# Patient Record
Sex: FEMALE | Race: WHITE | NOT HISPANIC OR LATINO | Employment: UNEMPLOYED | URBAN - METROPOLITAN AREA
[De-identification: names, ages, dates, MRNs, and addresses within clinical notes are randomized per-mention and may not be internally consistent; named-entity substitution may affect disease eponyms.]

---

## 2017-06-29 ENCOUNTER — HOSPITAL ENCOUNTER (EMERGENCY)
Facility: HOSPITAL | Age: 52
Discharge: HOME | End: 2017-06-29
Attending: EMERGENCY MEDICINE
Payer: COMMERCIAL

## 2017-06-29 VITALS
HEART RATE: 79 BPM | SYSTOLIC BLOOD PRESSURE: 116 MMHG | TEMPERATURE: 99 F | HEIGHT: 66 IN | BODY MASS INDEX: 26.36 KG/M2 | OXYGEN SATURATION: 96 % | DIASTOLIC BLOOD PRESSURE: 66 MMHG | WEIGHT: 164 LBS | RESPIRATION RATE: 18 BRPM

## 2017-06-29 DIAGNOSIS — T14.90XA TRAUMA: ICD-10-CM

## 2017-06-29 DIAGNOSIS — R51.9 HEADACHE: ICD-10-CM

## 2017-06-29 DIAGNOSIS — G44.319 ACUTE POST-TRAUMATIC HEADACHE, NOT INTRACTABLE: Primary | ICD-10-CM

## 2017-06-29 PROCEDURE — 93005 ELECTROCARDIOGRAM TRACING: CPT

## 2017-06-29 PROCEDURE — 99284 EMERGENCY DEPT VISIT MOD MDM: CPT | Mod: 25

## 2017-06-29 PROCEDURE — 25000003 PHARM REV CODE 250: Performed by: NURSE PRACTITIONER

## 2017-06-29 PROCEDURE — 96372 THER/PROPH/DIAG INJ SC/IM: CPT

## 2017-06-29 PROCEDURE — 63600175 PHARM REV CODE 636 W HCPCS: Performed by: NURSE PRACTITIONER

## 2017-06-29 RX ORDER — PROMETHAZINE HYDROCHLORIDE 25 MG/1
25 TABLET ORAL EVERY 6 HOURS PRN
Qty: 15 TABLET | Refills: 0 | Status: ON HOLD | OUTPATIENT
Start: 2017-06-29 | End: 2020-07-21 | Stop reason: CLARIF

## 2017-06-29 RX ORDER — HYDROCODONE BITARTRATE AND ACETAMINOPHEN 10; 325 MG/1; MG/1
TABLET ORAL
COMMUNITY
End: 2020-07-16

## 2017-06-29 RX ORDER — HYDROMORPHONE HYDROCHLORIDE 2 MG/ML
1 INJECTION, SOLUTION INTRAMUSCULAR; INTRAVENOUS; SUBCUTANEOUS
Status: COMPLETED | OUTPATIENT
Start: 2017-06-29 | End: 2017-06-29

## 2017-06-29 RX ORDER — HYDROCODONE BITARTRATE AND ACETAMINOPHEN 5; 325 MG/1; MG/1
2 TABLET ORAL
Status: COMPLETED | OUTPATIENT
Start: 2017-06-29 | End: 2017-06-29

## 2017-06-29 RX ORDER — ALPRAZOLAM 1 MG/1
1 TABLET ORAL 2 TIMES DAILY
Status: ON HOLD | COMMUNITY
End: 2020-07-21 | Stop reason: CLARIF

## 2017-06-29 RX ORDER — HYDROCODONE BITARTRATE AND ACETAMINOPHEN 7.5; 325 MG/1; MG/1
1 TABLET ORAL EVERY 4 HOURS PRN
Qty: 15 TABLET | Refills: 0 | Status: SHIPPED | OUTPATIENT
Start: 2017-06-29 | End: 2020-07-16

## 2017-06-29 RX ORDER — PROMETHAZINE HYDROCHLORIDE 25 MG/ML
25 INJECTION, SOLUTION INTRAMUSCULAR; INTRAVENOUS
Status: COMPLETED | OUTPATIENT
Start: 2017-06-29 | End: 2017-06-29

## 2017-06-29 RX ADMIN — HYDROMORPHONE HYDROCHLORIDE 1 MG: 2 INJECTION INTRAMUSCULAR; INTRAVENOUS; SUBCUTANEOUS at 06:06

## 2017-06-29 RX ADMIN — PROMETHAZINE HYDROCHLORIDE 25 MG: 25 INJECTION INTRAMUSCULAR; INTRAVENOUS at 06:06

## 2017-06-29 RX ADMIN — HYDROCODONE BITARTRATE AND ACETAMINOPHEN 2 TABLET: 5; 325 TABLET ORAL at 07:06

## 2017-06-29 NOTE — DISCHARGE INSTRUCTIONS
Please take hydrocodone for pain, as needed.    You can take phenergan for nausea, as needed.    Follow up with  for further evaluation and management of your symptoms as soon as possible.    Return to the ER for any new or worsening symptoms or concerns.
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
## 2018-08-03 ENCOUNTER — TELEPHONE (OUTPATIENT)
Dept: PAIN MEDICINE | Facility: CLINIC | Age: 53
End: 2018-08-03

## 2018-08-03 NOTE — TELEPHONE ENCOUNTER
Reminded patient of Pain Management appointment scheduled for Monday at 11.15a with Dr. Holbrook- verbal confirmation received.  Location information also provided.

## 2018-08-06 ENCOUNTER — OFFICE VISIT (OUTPATIENT)
Dept: PAIN MEDICINE | Facility: CLINIC | Age: 53
End: 2018-08-06
Payer: COMMERCIAL

## 2018-08-06 VITALS
HEART RATE: 61 BPM | RESPIRATION RATE: 18 BRPM | BODY MASS INDEX: 27.82 KG/M2 | WEIGHT: 173.13 LBS | DIASTOLIC BLOOD PRESSURE: 66 MMHG | SYSTOLIC BLOOD PRESSURE: 132 MMHG | OXYGEN SATURATION: 97 % | HEIGHT: 66 IN

## 2018-08-06 DIAGNOSIS — G89.4 CHRONIC PAIN DISORDER: Primary | ICD-10-CM

## 2018-08-06 DIAGNOSIS — M79.7 FIBROMYALGIA: ICD-10-CM

## 2018-08-06 PROCEDURE — 99243 OFF/OP CNSLTJ NEW/EST LOW 30: CPT | Mod: S$GLB,,, | Performed by: PAIN MEDICINE

## 2018-08-06 PROCEDURE — 99999 PR PBB SHADOW E&M-EST. PATIENT-LVL III: CPT | Mod: PBBFAC,,, | Performed by: PAIN MEDICINE

## 2018-08-06 NOTE — PROGRESS NOTES
Subjective:     Patient ID: Myra Harrison is a 53 y.o. female    Chief Complaint: Fibromyalgia (patient experiences pain all over due to fibromyalgia- patient states limited mobility- treatment w/ medication,PT ( discontinued due to increased pain levels))      Referred by: Gilles Leyva MD      HPI:    Initial Encounter (8/6/18):  Myra Harrison is a 53 y.o. female who presents today with chronic widespread diagnosed with fibromyalgia. She has been managed with chronic fentanyl and Norco. She also takes Xanax chronically. These are prescribed by her PCP. She was seen by her PCP last month and he refilled her Fentanyl and Norco. For insurance reasons she was unable to fill these prescriptions. She states that she has not taken fentanyl in 3 weeks and has not taken Norco in 1.5 weeks. She denies any overt symptoms of withdrawal but does report increased pain. She states that she hurts all over and that it feels like something is eating her bones. The pain limits her activity. She denies any specific foci of pain. She endorses depressed mood. This pain is described in detail below.    Physical Therapy: Yes    Non-pharmacologic Treatment: Nothing helps         · TENS? No    Pain Medications:         · Currently taking: Nothing    · Has tried in the past:  Opioids, NSAIDs, muscle relaxers, SSRIs/SNRIs, Tylenol    · Has not tried: TCAs, anticonvulsants, topical creams    Blood thinners: None    Interventional Therapies: None    Relevant Surgeries: None    Affecting sleep? Yes    Affecting daily activities? yes    Depressive symptoms? yes          · SI/HI? No    Work status: Unemployed    Pain Scores:    Best:       8/10  Worst:     10/10  Usually:   8/10  Today:    8/10    Review of Systems   Constitutional: Negative for activity change, appetite change, chills, fatigue, fever and unexpected weight change.   HENT: Negative for hearing loss.    Eyes: Negative for visual disturbance.   Respiratory:  Negative for chest tightness and shortness of breath.    Cardiovascular: Negative for chest pain.   Gastrointestinal: Negative for abdominal pain, constipation, diarrhea, nausea and vomiting.   Genitourinary: Negative for difficulty urinating.   Musculoskeletal: Positive for arthralgias, back pain, myalgias, neck pain and neck stiffness. Negative for gait problem.   Skin: Negative for rash.   Neurological: Negative for dizziness, weakness, light-headedness, numbness and headaches.   Psychiatric/Behavioral: Positive for sleep disturbance. Negative for hallucinations and suicidal ideas. The patient is not nervous/anxious.        Past Medical History:   Diagnosis Date    Asthma     Brain mass 2007    Depression     Fibromyalgia     Seizures 2007    fell and hit head at grocery          Past Surgical History:   Procedure Laterality Date    CARPAL TUNNEL RELEASE      right     SECTION      x 3    cysto/ureteroscopy stent placement      HYSTERECTOMY      TONSILLECTOMY         Social History     Social History    Marital status:      Spouse name: N/A    Number of children: N/A    Years of education: N/A     Occupational History    Not on file.     Social History Main Topics    Smoking status: Never Smoker    Smokeless tobacco: Never Used    Alcohol use Yes      Comment: occasionally    Drug use: No    Sexual activity: Yes     Other Topics Concern    Not on file     Social History Narrative    No narrative on file       Review of patient's allergies indicates:   Allergen Reactions    Penicillins Itching       Current Outpatient Prescriptions on File Prior to Visit   Medication Sig Dispense Refill    alprazolam (XANAX) 1 MG tablet Take 1 mg by mouth 2 (two) times daily.      citalopram (CELEXA) 10 MG tablet Take 10 mg by mouth once daily.  1    ondansetron (ZOFRAN-ODT) 4 MG TbDL Take 1 tablet (4 mg total) by mouth every 8 (eight) hours as needed. 10 tablet 0    phentermine 37.5 MG  "capsule Take 37.5 mg by mouth 2 (two) times daily.      promethazine (PHENERGAN) 12.5 MG Tab Take 1 tablet (12.5 mg total) by mouth 4 (four) times daily. 20 tablet 0    promethazine (PHENERGAN) 25 MG tablet Take 1 tablet (25 mg total) by mouth every 6 (six) hours as needed for Nausea. May cause drowsiness 15 tablet 0    ranitidine (ZANTAC) 75 MG tablet Take 75 mg by mouth 2 (two) times daily.      UNABLE TO FIND Apply 1 patch topically Every 3 (three) days. medication name:       fentaNYL (DURAGESIC) 25 mcg/hr   0    hydrocodone-acetaminophen 10-325mg (NORCO)  mg Tab Take by mouth.      hydrocodone-acetaminophen 7.5-325mg (NORCO) 7.5-325 mg per tablet Take 1 tablet by mouth every 4 (four) hours as needed for Pain. May cause drowsiness 15 tablet 0    oxybutynin (DITROPAN-XL) 10 MG 24 hr tablet Take 1 tablet (10 mg total) by mouth once daily. 30 tablet 1    oxycodone (ROXICODONE) 15 MG Tab Take 1 tablet (15 mg total) by mouth every 4 (four) hours as needed. 20 tablet 0    oxycodone-acetaminophen (PERCOCET) 5-325 mg per tablet Take 1 tablet by mouth every 4 (four) hours as needed for Pain. 20 tablet 0     No current facility-administered medications on file prior to visit.        Objective:      /66   Pulse 61   Resp 18   Ht 5' 6" (1.676 m)   Wt 78.5 kg (173 lb 1.6 oz)   SpO2 97%   BMI 27.94 kg/m²     Exam:  GEN:  Well developed, well nourished.  No acute distress.   HEENT:  No trauma.  Mucous membranes moist.  Nares patent bilaterally.  PSYCH: Normal affect. Thought content appropriate.  CHEST:  Breathing symmetric.  No audible wheezing.  ABD: Soft, non-distended.  SKIN:  Warm, pink, dry.  No rash on exposed areas.    EXT:  No cyanosis, clubbing, or edema.  No color change or changes in nail or hair growth.  NEURO/MUSCULOSKELETAL:  Fully alert, oriented, and appropriate. Speech normal jon. No cranial nerve deficits.   Gait: Normal.  No focal motor deficits.       Imaging:  No pertinent " imaging available for review at this time.     Assessment:       Encounter Diagnoses   Name Primary?    Chronic pain disorder Yes    Fibromyalgia          Plan:       Myra was seen today for fibromyalgia.    Diagnoses and all orders for this visit:    Chronic pain disorder    Fibromyalgia        Myra Harrison is a 53 y.o. female with chronic widespread pain. Has been diagnosed with fibromyalgia. Previously managed with chronic opioid medications. Has essentially been weaned recently.     1. We had a very long discussion about the proper way to treat pain from fibromyalgia. I stressed the importance of ensuring that depression/anxiety is weill controlled. I also stressed the importance of daily low-impact, low-intensity, aerobic activity for 60 continuous minutes.   2. We discuss that opioid medications are not indicated for the treatment of chronic fibromyalgia pain. We discussed that potential adverse effects of long term opioid use. I recommended that she decline any further opioid medications for the treatment of her fibromyalgia.  3. I recommend that she be weaned from Xanax as well. This is known to disrupt physiologic sleep cycles and lead to worsened pain and mood.  4. RTC PRN.

## 2018-08-06 NOTE — LETTER
August 6, 2018      Gilles Leyva MD  [address] [facility] [address]  [place] [address]           [facility]  [address] [facility]  [place] [address]  Phone: [phone]  Fax: [phone]          Patient: Myra Harrison   MR Number: [id number]   YOB: 1965   Date of Visit: 8/6/2018       Dear Dr. Gilles Leyva:    Thank you for referring Myra Harrison to me for evaluation. Attached you will find relevant portions of my assessment and plan of care.    If you have questions, please do not hesitate to call me. I look forward to following Myra Harrison along with you.    Sincerely,    Mario Holbrook Jr., MD    Enclosure  CC:  No Recipients    If you would like to receive this communication electronically, please contact [email or web address] or [phone] to request more information on [employer] Link access.    For providers and/or their staff who would like to refer a patient to [facility], please contact us through our one-stop-shop provider referral line, [facility], at [phone].    If you feel you have received this communication in error or would no longer like to receive these types of communications, please e-mail [email or web address]

## 2020-07-16 ENCOUNTER — HOSPITAL ENCOUNTER (EMERGENCY)
Facility: HOSPITAL | Age: 55
Discharge: HOME | End: 2020-07-16
Attending: EMERGENCY MEDICINE
Payer: COMMERCIAL

## 2020-07-16 VITALS
DIASTOLIC BLOOD PRESSURE: 62 MMHG | RESPIRATION RATE: 20 BRPM | TEMPERATURE: 99 F | BODY MASS INDEX: 24.11 KG/M2 | OXYGEN SATURATION: 93 % | HEART RATE: 83 BPM | WEIGHT: 150 LBS | SYSTOLIC BLOOD PRESSURE: 130 MMHG | HEIGHT: 66 IN

## 2020-07-16 DIAGNOSIS — R05.9 COUGH: ICD-10-CM

## 2020-07-16 DIAGNOSIS — R50.9 FEVER: ICD-10-CM

## 2020-07-16 DIAGNOSIS — J06.9 VIRAL URI WITH COUGH: Primary | ICD-10-CM

## 2020-07-16 LAB
ALBUMIN SERPL BCP-MCNC: 3.6 G/DL (ref 3.5–5.2)
ALP SERPL-CCNC: 77 U/L (ref 55–135)
ALT SERPL W/O P-5'-P-CCNC: 36 U/L (ref 10–44)
ANION GAP SERPL CALC-SCNC: 12 MMOL/L (ref 8–16)
AST SERPL-CCNC: 33 U/L (ref 10–40)
BACTERIA #/AREA URNS HPF: NORMAL /HPF
BASOPHILS # BLD AUTO: 0.03 K/UL (ref 0–0.2)
BASOPHILS NFR BLD: 0.3 % (ref 0–1.9)
BILIRUB SERPL-MCNC: 0.4 MG/DL (ref 0.1–1)
BILIRUB UR QL STRIP: NEGATIVE
BUN SERPL-MCNC: 10 MG/DL (ref 6–20)
CALCIUM SERPL-MCNC: 9.7 MG/DL (ref 8.7–10.5)
CHLORIDE SERPL-SCNC: 105 MMOL/L (ref 95–110)
CK SERPL-CCNC: 38 U/L (ref 20–180)
CLARITY UR: CLEAR
CO2 SERPL-SCNC: 23 MMOL/L (ref 23–29)
COLOR UR: ABNORMAL
CREAT SERPL-MCNC: 0.9 MG/DL (ref 0.5–1.4)
CRP SERPL-MCNC: 88.3 MG/L (ref 0–8.2)
DIFFERENTIAL METHOD: ABNORMAL
EOSINOPHIL # BLD AUTO: 0.1 K/UL (ref 0–0.5)
EOSINOPHIL NFR BLD: 0.7 % (ref 0–8)
ERYTHROCYTE [DISTWIDTH] IN BLOOD BY AUTOMATED COUNT: 12.3 % (ref 11.5–14.5)
EST. GFR  (AFRICAN AMERICAN): >60 ML/MIN/1.73 M^2
EST. GFR  (NON AFRICAN AMERICAN): >60 ML/MIN/1.73 M^2
FERRITIN SERPL-MCNC: 594 NG/ML (ref 20–300)
GLUCOSE SERPL-MCNC: 98 MG/DL (ref 70–110)
GLUCOSE UR QL STRIP: NEGATIVE
HCT VFR BLD AUTO: 41.9 % (ref 37–48.5)
HGB BLD-MCNC: 14.1 G/DL (ref 12–16)
HGB UR QL STRIP: ABNORMAL
IMM GRANULOCYTES # BLD AUTO: 0.03 K/UL (ref 0–0.04)
IMM GRANULOCYTES NFR BLD AUTO: 0.3 % (ref 0–0.5)
KETONES UR QL STRIP: NEGATIVE
LACTATE SERPL-SCNC: 1.2 MMOL/L (ref 0.5–2.2)
LDH SERPL L TO P-CCNC: 181 U/L (ref 110–260)
LEUKOCYTE ESTERASE UR QL STRIP: ABNORMAL
LYMPHOCYTES # BLD AUTO: 1.5 K/UL (ref 1–4.8)
LYMPHOCYTES NFR BLD: 16.4 % (ref 18–48)
MCH RBC QN AUTO: 30.1 PG (ref 27–31)
MCHC RBC AUTO-ENTMCNC: 33.7 G/DL (ref 32–36)
MCV RBC AUTO: 89 FL (ref 82–98)
MICROSCOPIC COMMENT: NORMAL
MONOCYTES # BLD AUTO: 0.4 K/UL (ref 0.3–1)
MONOCYTES NFR BLD: 4.2 % (ref 4–15)
NEUTROPHILS # BLD AUTO: 7.2 K/UL (ref 1.8–7.7)
NEUTROPHILS NFR BLD: 78.1 % (ref 38–73)
NITRITE UR QL STRIP: NEGATIVE
NRBC BLD-RTO: 0 /100 WBC
PH UR STRIP: 6 [PH] (ref 5–8)
PLATELET # BLD AUTO: 299 K/UL (ref 150–350)
PMV BLD AUTO: 10.3 FL (ref 9.2–12.9)
POTASSIUM SERPL-SCNC: 3.7 MMOL/L (ref 3.5–5.1)
PROCALCITONIN SERPL IA-MCNC: 0.13 NG/ML
PROT SERPL-MCNC: 7.7 G/DL (ref 6–8.4)
PROT UR QL STRIP: NEGATIVE
RBC # BLD AUTO: 4.69 M/UL (ref 4–5.4)
RBC #/AREA URNS HPF: 2 /HPF (ref 0–4)
SARS-COV-2 RDRP RESP QL NAA+PROBE: NEGATIVE
SODIUM SERPL-SCNC: 140 MMOL/L (ref 136–145)
SP GR UR STRIP: 1 (ref 1–1.03)
SQUAMOUS #/AREA URNS HPF: 2 /HPF
TROPONIN I SERPL DL<=0.01 NG/ML-MCNC: <0.006 NG/ML (ref 0–0.03)
URN SPEC COLLECT METH UR: ABNORMAL
UROBILINOGEN UR STRIP-ACNC: NEGATIVE EU/DL
WBC # BLD AUTO: 9.23 K/UL (ref 3.9–12.7)
WBC #/AREA URNS HPF: 4 /HPF (ref 0–5)

## 2020-07-16 PROCEDURE — 96375 TX/PRO/DX INJ NEW DRUG ADDON: CPT

## 2020-07-16 PROCEDURE — 87040 BLOOD CULTURE FOR BACTERIA: CPT

## 2020-07-16 PROCEDURE — 96361 HYDRATE IV INFUSION ADD-ON: CPT

## 2020-07-16 PROCEDURE — 25000003 PHARM REV CODE 250: Performed by: PHYSICIAN ASSISTANT

## 2020-07-16 PROCEDURE — 85025 COMPLETE CBC W/AUTO DIFF WBC: CPT

## 2020-07-16 PROCEDURE — 96374 THER/PROPH/DIAG INJ IV PUSH: CPT

## 2020-07-16 PROCEDURE — 84145 PROCALCITONIN (PCT): CPT

## 2020-07-16 PROCEDURE — 80053 COMPREHEN METABOLIC PANEL: CPT

## 2020-07-16 PROCEDURE — 87186 SC STD MICRODIL/AGAR DIL: CPT

## 2020-07-16 PROCEDURE — 82550 ASSAY OF CK (CPK): CPT

## 2020-07-16 PROCEDURE — C9113 INJ PANTOPRAZOLE SODIUM, VIA: HCPCS | Performed by: PHYSICIAN ASSISTANT

## 2020-07-16 PROCEDURE — 99285 EMERGENCY DEPT VISIT HI MDM: CPT | Mod: 25

## 2020-07-16 PROCEDURE — 86140 C-REACTIVE PROTEIN: CPT

## 2020-07-16 PROCEDURE — 93010 ELECTROCARDIOGRAM REPORT: CPT | Mod: ,,, | Performed by: INTERNAL MEDICINE

## 2020-07-16 PROCEDURE — 63600175 PHARM REV CODE 636 W HCPCS: Performed by: PHYSICIAN ASSISTANT

## 2020-07-16 PROCEDURE — U0002 COVID-19 LAB TEST NON-CDC: HCPCS

## 2020-07-16 PROCEDURE — 82728 ASSAY OF FERRITIN: CPT

## 2020-07-16 PROCEDURE — 83615 LACTATE (LD) (LDH) ENZYME: CPT

## 2020-07-16 PROCEDURE — 93005 ELECTROCARDIOGRAM TRACING: CPT

## 2020-07-16 PROCEDURE — 83605 ASSAY OF LACTIC ACID: CPT

## 2020-07-16 PROCEDURE — 93010 EKG 12-LEAD: ICD-10-PCS | Mod: ,,, | Performed by: INTERNAL MEDICINE

## 2020-07-16 PROCEDURE — 87077 CULTURE AEROBIC IDENTIFY: CPT

## 2020-07-16 PROCEDURE — 84484 ASSAY OF TROPONIN QUANT: CPT

## 2020-07-16 PROCEDURE — 81000 URINALYSIS NONAUTO W/SCOPE: CPT

## 2020-07-16 RX ORDER — PANTOPRAZOLE SODIUM 40 MG/10ML
40 INJECTION, POWDER, LYOPHILIZED, FOR SOLUTION INTRAVENOUS
Status: COMPLETED | OUTPATIENT
Start: 2020-07-16 | End: 2020-07-16

## 2020-07-16 RX ORDER — ACETAMINOPHEN 500 MG
1000 TABLET ORAL
Status: COMPLETED | OUTPATIENT
Start: 2020-07-16 | End: 2020-07-16

## 2020-07-16 RX ORDER — ONDANSETRON 2 MG/ML
8 INJECTION INTRAMUSCULAR; INTRAVENOUS
Status: COMPLETED | OUTPATIENT
Start: 2020-07-16 | End: 2020-07-16

## 2020-07-16 RX ORDER — ALBUTEROL SULFATE 90 UG/1
2 AEROSOL, METERED RESPIRATORY (INHALATION) EVERY 6 HOURS PRN
Status: DISCONTINUED | OUTPATIENT
Start: 2020-07-16 | End: 2020-07-16

## 2020-07-16 RX ORDER — ALBUTEROL SULFATE 90 UG/1
1-2 AEROSOL, METERED RESPIRATORY (INHALATION) EVERY 4 HOURS PRN
Qty: 8 G | Refills: 0 | Status: SHIPPED | OUTPATIENT
Start: 2020-07-16 | End: 2021-07-16

## 2020-07-16 RX ORDER — PANTOPRAZOLE SODIUM 20 MG/1
20 TABLET, DELAYED RELEASE ORAL DAILY
Qty: 30 TABLET | Refills: 0 | Status: ON HOLD | OUTPATIENT
Start: 2020-07-16 | End: 2020-07-21 | Stop reason: SDUPTHER

## 2020-07-16 RX ORDER — GUAIFENESIN/DEXTROMETHORPHAN 100-10MG/5
10 SYRUP ORAL ONCE
Status: COMPLETED | OUTPATIENT
Start: 2020-07-16 | End: 2020-07-16

## 2020-07-16 RX ORDER — ONDANSETRON 4 MG/1
4 TABLET, ORALLY DISINTEGRATING ORAL EVERY 6 HOURS PRN
Qty: 20 TABLET | Refills: 0 | Status: ON HOLD | OUTPATIENT
Start: 2020-07-16 | End: 2020-07-21 | Stop reason: SDUPTHER

## 2020-07-16 RX ORDER — PROMETHAZINE HYDROCHLORIDE AND DEXTROMETHORPHAN HYDROBROMIDE 6.25; 15 MG/5ML; MG/5ML
5 SYRUP ORAL NIGHTLY PRN
Qty: 120 ML | Refills: 0 | Status: ON HOLD | OUTPATIENT
Start: 2020-07-16 | End: 2020-07-21 | Stop reason: CLARIF

## 2020-07-16 RX ADMIN — ONDANSETRON HYDROCHLORIDE 8 MG: 2 SOLUTION INTRAMUSCULAR; INTRAVENOUS at 06:07

## 2020-07-16 RX ADMIN — SODIUM CHLORIDE 2040 ML: 0.9 INJECTION, SOLUTION INTRAVENOUS at 06:07

## 2020-07-16 RX ADMIN — PANTOPRAZOLE SODIUM 40 MG: 40 INJECTION, POWDER, FOR SOLUTION INTRAVENOUS at 06:07

## 2020-07-16 RX ADMIN — GUAIFENESIN AND DEXTROMETHORPHAN 10 ML: 100; 10 SYRUP ORAL at 07:07

## 2020-07-16 RX ADMIN — ACETAMINOPHEN 1000 MG: 500 TABLET ORAL at 06:07

## 2020-07-16 NOTE — PROVIDER PROGRESS NOTES - EMERGENCY DEPT.
Emergency Department TeleTRIAGE Encounter Note      CHIEF COMPLAINT    Chief Complaint   Patient presents with    COVID-19 Concerns     Pt c/o cough, off and on fever, NV, back pain, chest pain (worsens when taking a deep breath). Spoke with primary care and advised to seek ED for a chest x-ray       VITAL SIGNS   Initial Vitals [07/16/20 1742]   BP Pulse Resp Temp SpO2   127/75 110 20 (!) 102.1 °F (38.9 °C) 97 %      MAP       --            ALLERGIES    Review of patient's allergies indicates:   Allergen Reactions    Penicillins Itching       PROVIDER TRIAGE NOTE  Patient is a 55-year-old female presenting to the emergency department for evaluation of fever and cough.  Patient states her symptoms have been persistent for the past few days.  She also reports nausea and vomiting.  She tried taking Tylenol 30 min prior to arrival as well as Phenergan.  She states that neither of them have helped.  PCP recommended she come to the ED for chest x-ray.  She does report some mild shortness of breath.      ORDERS  Labs Reviewed - No data to display    ED Orders (720h ago, onward)    Start Ordered     Status Ordering Provider    07/16/20 1746 07/16/20 1746  X-Ray Chest AP Portable  1 time imaging      Ordered JERI HARRISON            Virtual Visit Note: The provider triage portion of this emergency department evaluation and documentation was performed via [employer], a HIPAA-compliant telemedicine application, in concert with a tele-presenter in the room. A face to face patient evaluation with one of my colleagues will occur once the patient is placed in an emergency department room.      DISCLAIMER: This note was prepared with [employer] voice recognition transcription software. Garbled syntax, mangled pronouns, and other bizarre constructions may be attributed to that software system.

## 2020-07-16 NOTE — ED PROVIDER NOTES
Encounter Date: 2020       History     Chief Complaint   Patient presents with    COVID-19 Concerns     Pt c/o cough, off and on fever, NV, back pain, chest pain (worsens when taking a deep breath). 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  She states yesterday she began with shortness of breath, nausea vomiting, posttussive emesis, chills, myalgias.  Today she began with fever and midsternal chest pain.  Chest pain is constant, nonexertional.  No associated lightheadedness, dizziness.  No radiation of chest pain to her back, down her arms, to her neck.  No cardiac history.  She does feel generally weak and tired over the past 2 days.  She also admits to GERD-like burning epigastric pain x2 days.  No urinary complaints.  No flank pain.  She denies sick contacts or recent hospitalization.  No neck pain or stiffness.  No IV drug use.  Anorexia x2 days.  No unilateral leg swelling or calf pain.        Review of patient's allergies indicates:   Allergen Reactions    Penicillins Itching     Past Medical History:   Diagnosis Date    Asthma     Brain mass     Depression     Fibromyalgia     Hypertension     Seizures     fell and hit head at grocery        Past Surgical History:   Procedure Laterality Date    CARPAL TUNNEL RELEASE      right     SECTION      x 3    cysto/ureteroscopy stent placement      HYSTERECTOMY      TONSILLECTOMY       Family History   Problem Relation Age of Onset    Cancer Mother         Breast cancer    Heart disease Mother     Diabetes Father     Cancer Maternal Grandmother         Lung Cancer    Cancer Maternal Grandfather         Lung Cancer    Cancer Paternal Grandmother     Cancer Paternal Grandfather       Social History     Tobacco Use    Smoking status: Never Smoker    Smokeless tobacco: Never Used   Substance Use Topics    Alcohol use: Yes     Comment: occasionally    Drug use: No     Review of Systems   Constitutional: Positive for appetite change, chills, fatigue and fever.   Respiratory: Positive for cough and shortness of breath. Negative for chest tightness.    Cardiovascular: Positive for chest pain. Negative for palpitations and leg swelling.   Gastrointestinal: Positive for abdominal pain, nausea and vomiting. Negative for constipation and diarrhea.   Genitourinary: Negative for dysuria and flank pain.   Musculoskeletal: Positive for myalgias. Negative for neck pain and neck stiffness.   Skin: Negative for rash.   Neurological: Positive for weakness. Negative for dizziness, syncope and light-headedness.       Physical Exam     Initial Vitals [07/16/20 1742]   BP Pulse Resp Temp SpO2   127/75 110 20 (!) 102.1 °F (38.9 °C) 97 %      MAP       --         Physical Exam    Nursing note and vitals reviewed.  Constitutional: She appears well-developed and well-nourished. She is not diaphoretic. No distress.   Ill appearing, nontoxic.  Infrequent nonproductive cough during exam.  Posttussive emesis during exam.   HENT:   Head: Normocephalic and atraumatic.   Tacky mucous membranes   Eyes: EOM are normal.   Neck: Normal range of motion. Neck supple.   Cardiovascular: Intact distal pulses.   Sinus tachycardia.  No pretibial edema.  No unilateral leg swelling.   Pulmonary/Chest: Breath sounds normal. No respiratory distress. She has no wheezes. She has no rhonchi. She exhibits no tenderness.   Abdominal: She exhibits no distension. There is no rebound and no guarding.   Mild epigastric tenderness.   Musculoskeletal: Normal range of motion.   Neurological: She is alert and oriented to person, place, and time. GCS score is 15. GCS eye subscore is 4. GCS verbal subscore is 5. GCS motor subscore is 6.   Skin: Skin  is warm. Capillary refill takes less than 2 seconds.   Psychiatric: She has a normal mood and affect. Thought content normal.         ED Course   Procedures  Labs Reviewed   CBC W/ AUTO DIFFERENTIAL - Abnormal; Notable for the following components:       Result Value    Gran% 78.1 (*)     Lymph% 16.4 (*)     All other components within normal limits   URINALYSIS, REFLEX TO URINE CULTURE - Abnormal; Notable for the following components:    Occult Blood UA 1+ (*)     Leukocytes, UA 2+ (*)     All other components within normal limits    Narrative:     Specimen Source->Urine   C-REACTIVE PROTEIN - Abnormal; Notable for the following components:    CRP 88.3 (*)     All other components within normal limits   FERRITIN - Abnormal; Notable for the following components:    Ferritin 594 (*)     All other components within normal limits   CULTURE, BLOOD   CULTURE, BLOOD   COMPREHENSIVE METABOLIC PANEL   LACTIC ACID, PLASMA   LACTATE DEHYDROGENASE   CK   SARS-COV-2 RNA AMPLIFICATION, QUAL   TROPONIN I   PROCALCITONIN   URINALYSIS MICROSCOPIC    Narrative:     Specimen Source->Urine     EKG Readings: (Independently Interpreted)   Sinus tachycardia, ventricular rate 112 beats per minute.  Normal MS.  Slightly prolonged QT, similar to previous.  Appearance of aVR and aVL likely related to limb lead reversal.  No arrhythmia, no ST elevation.    Repeat EKG with improved appearance of the aVR and aVL abnormalities on previous EKG.       Imaging Results          X-Ray Chest AP Portable (Final result)  Result time 07/16/20 18:47:58    Final result by Israel Jackson MD (07/16/20 18:47:58)                 Impression:      No acute cardiopulmonary process identified.      Electronically signed by: Israel Jackson MD  Date:    07/16/2020  Time:    18:47             Narrative:    EXAMINATION:  XR CHEST AP PORTABLE    CLINICAL HISTORY:  Cough    TECHNIQUE:  Single frontal view of the chest was performed.    COMPARISON:  June  2017.    FINDINGS:  Cardiac silhouette is normal in size.  Lungs are symmetrically expanded.  No evidence of focal consolidative process, pneumothorax, or significant pleural effusion.  No acute osseous abnormality identified.                              X-Rays:   Independently Interpreted Readings:   Chest X-Ray: No consolidation, effusion, or pneumothorax.  No acute bony abnormality.     Medical Decision Making:   Differential Diagnosis:   Viral illness, pneumonia, bronchitis, pharyngitis, myocardial ischemia  Clinical Tests:   Lab Tests: Ordered and Reviewed  Radiological Study: Ordered and Reviewed  Medical Tests: Ordered and Reviewed  ED Management:  55-year-old female with 3 week history of cough, now with chills myalgias nausea vomiting posttussive emesis shortness of breath yesterday.  Began with chest pain and fever today.  She is ill-appearing.  She is febrile.  She is normotensive.  Lungs are clear.  Normal oxygenation.  I will treat her symptoms, give her some fluids, check labs, re-evaluate.    No history of thrombophilia.  No recent surgery.  No major trauma. No recent immobility.  No active cancer.  No exogenous estrogen.  Patient is not pregnant.  This is not following the immediate postpartum interval if patient has been pregnant. No history of percutaneous indwelling catheter. No previous DVT/PE.  I will treat her fever, re-evaluate vitals.  I think unlikely PE at this time.    Overall, suspect viral illness discomfort patient's complaints and presentation.  I will discharge with supportive medications, have her contact her primary for follow-up.  She does understand and agree this plan.                   ED Course as of Jul 16 2151   Thu Jul 16, 2020   1832 Ambulatory pulse oximetry 98%.    [SM]   1939 On re-evaluation, she is feeling better.  No longer coughing nearly as much.  Shortness of breath nearly resolved.  Continue to monitor, remainder of labs pending.  So far, workup grossly  unremarkable without evidence of pneumonia, troponin within normal limits, no leukocytosis, lactic within normal limits.  Rapid COVID negative.    [SM]      ED Course User Index  [SM] Navdeep Peterson PA-C                Clinical Impression:       ICD-10-CM ICD-9-CM   1. Viral URI with cough  J06.9 465.9    B97.89    2. Cough  R05 786.2   3. Fever  R50.9 780.60         Disposition:   Disposition: Discharged  Condition: Stable     ED Disposition Condition    Discharge Stable        ED Prescriptions     Medication Sig Dispense Start Date End Date Auth. Provider    ondansetron (ZOFRAN-ODT) 4 MG TbDL Take 1 tablet (4 mg total) by mouth every 6 (six) hours as needed (Nausea). 20 tablet 7/16/2020  Navdeep Peterson PA-C    albuterol (PROVENTIL/VENTOLIN HFA) 90 mcg/actuation inhaler Inhale 1-2 puffs into the lungs every 4 (four) hours as needed for Shortness of Breath. Rescue 8 g 7/16/2020 7/16/2021 Navdeep Peterson PA-C    pantoprazole (PROTONIX) 20 MG tablet Take 1 tablet (20 mg total) by mouth once daily. 30 tablet 7/16/2020 7/16/2021 Navdeep Peterson PA-C    promethazine-dextromethorphan (PROMETHAZINE-DM) 6.25-15 mg/5 mL Syrp Take 5 mLs by mouth nightly as needed (cough). 120 mL 7/16/2020 7/26/2020 Navdeep Peterson PA-C        Follow-up Information     Follow up With Specialties Details Why Contact Info    Gilles Leyva MD Internal Medicine Schedule an appointment as soon as possible for a visit  For reevaluation [address] [facility]  [facility] [address]  [phone]      [facility] Emergency Medicine  As needed, If symptoms worsen [address] [place] [address] [phone]                                     Navdeep Peterson PA-C  07/16/20 [id number]

## 2020-07-16 NOTE — ED TRIAGE NOTES
Pt reports to ED c/o SOB, chest tightness, stabbing pain in lower back, vomiting x3 episodes today. Pt c/o of cough for 2 weeks and was instructed by MD to come into ER for hx of PNA. Pt is satting at 98%. Pt reports chills and reports to ED with fever. Pt reports body aches, HA, and dizziness.    99.1

## 2020-07-17 NOTE — DISCHARGE INSTRUCTIONS
Drink lots of fluids, stay well hydrated. Tylenol/Ibuprofen as needed for discomfort; go back and forth between these two medications every 4 hrs as needed for temp greater than or equal to 100.4F, as needed for congestion/headache/body aches. Robitussin for cough.  Promethazine DM for cough in the evenings. Be aware, this medication is sedating.  Do not mix with alcohol or any other sedating medications.  Do not drive or operate machinery when taking this medication.  Albuterol inhaler as needed for any shortness of breath or coughing bouts.  Zofran as needed for nausea.     Follow-up with primary care provider for reevaluation, further recommendations. Return to this ED if unable to treat fever, if symptoms persist or worsen despite treatment, if you begin with worsening shortness of breath or difficulty breathing, if you begin with severe chest pain, if no longer eating or drinking, if any other problems occur.

## 2020-07-19 ENCOUNTER — HOSPITAL ENCOUNTER (INPATIENT)
Facility: HOSPITAL | Age: 55
LOS: 2 days | Discharge: HOME | DRG: 660 | End: 2020-07-21
Attending: EMERGENCY MEDICINE | Admitting: EMERGENCY MEDICINE
Payer: COMMERCIAL

## 2020-07-19 DIAGNOSIS — R31.9 URINARY TRACT INFECTION WITH HEMATURIA, SITE UNSPECIFIED: ICD-10-CM

## 2020-07-19 DIAGNOSIS — R05.9 COUGH: ICD-10-CM

## 2020-07-19 DIAGNOSIS — R78.81 E COLI BACTEREMIA: Primary | ICD-10-CM

## 2020-07-19 DIAGNOSIS — R78.81 BACTEREMIA: ICD-10-CM

## 2020-07-19 DIAGNOSIS — B96.20 E COLI BACTEREMIA: Primary | ICD-10-CM

## 2020-07-19 DIAGNOSIS — N39.0 URINARY TRACT INFECTION WITH HEMATURIA, SITE UNSPECIFIED: ICD-10-CM

## 2020-07-19 PROBLEM — G40.909 SEIZURE DISORDER: Status: ACTIVE | Noted: 2020-07-19

## 2020-07-19 PROBLEM — F32.9 MDD (MAJOR DEPRESSIVE DISORDER): Status: ACTIVE | Noted: 2020-07-19

## 2020-07-19 PROBLEM — I10 ESSENTIAL HYPERTENSION: Status: ACTIVE | Noted: 2020-07-19

## 2020-07-19 PROBLEM — J45.909 ASTHMA: Status: ACTIVE | Noted: 2020-07-19

## 2020-07-19 PROBLEM — Z87.442 HISTORY OF NEPHROLITHIASIS: Status: ACTIVE | Noted: 2020-07-19

## 2020-07-19 PROBLEM — U07.1 COVID-19: Status: ACTIVE | Noted: 2020-07-19

## 2020-07-19 LAB
ALBUMIN SERPL BCP-MCNC: 3.2 G/DL (ref 3.5–5.2)
ALP SERPL-CCNC: 69 U/L (ref 55–135)
ALT SERPL W/O P-5'-P-CCNC: 35 U/L (ref 10–44)
ANION GAP SERPL CALC-SCNC: 9 MMOL/L (ref 8–16)
AST SERPL-CCNC: 53 U/L (ref 10–40)
BACTERIA #/AREA URNS HPF: ABNORMAL /HPF
BACTERIA BLD CULT: ABNORMAL
BASOPHILS # BLD AUTO: 0.03 K/UL (ref 0–0.2)
BASOPHILS NFR BLD: 0.4 % (ref 0–1.9)
BILIRUB SERPL-MCNC: 0.3 MG/DL (ref 0.1–1)
BILIRUB UR QL STRIP: NEGATIVE
BUN SERPL-MCNC: 11 MG/DL (ref 6–20)
CALCIUM SERPL-MCNC: 9.8 MG/DL (ref 8.7–10.5)
CHLORIDE SERPL-SCNC: 107 MMOL/L (ref 95–110)
CK SERPL-CCNC: 42 U/L (ref 20–180)
CLARITY UR: ABNORMAL
CO2 SERPL-SCNC: 25 MMOL/L (ref 23–29)
COLOR UR: YELLOW
CREAT SERPL-MCNC: 0.9 MG/DL (ref 0.5–1.4)
CRP SERPL-MCNC: 57.7 MG/L (ref 0–8.2)
D DIMER PPP IA.FEU-MCNC: 1.87 MG/L FEU
DIFFERENTIAL METHOD: NORMAL
EOSINOPHIL # BLD AUTO: 0.2 K/UL (ref 0–0.5)
EOSINOPHIL NFR BLD: 3 % (ref 0–8)
ERYTHROCYTE [DISTWIDTH] IN BLOOD BY AUTOMATED COUNT: 12 % (ref 11.5–14.5)
ERYTHROCYTE [SEDIMENTATION RATE] IN BLOOD BY WESTERGREN METHOD: 80 MM/HR (ref 0–20)
EST. GFR  (AFRICAN AMERICAN): >60 ML/MIN/1.73 M^2
EST. GFR  (NON AFRICAN AMERICAN): >60 ML/MIN/1.73 M^2
FERRITIN SERPL-MCNC: 798 NG/ML (ref 20–300)
GLUCOSE SERPL-MCNC: 92 MG/DL (ref 70–110)
GLUCOSE UR QL STRIP: NEGATIVE
HCT VFR BLD AUTO: 42.4 % (ref 37–48.5)
HGB BLD-MCNC: 13.8 G/DL (ref 12–16)
HGB UR QL STRIP: ABNORMAL
HIV1+2 IGG SERPL QL IA.RAPID: NORMAL
HYALINE CASTS #/AREA URNS LPF: 0 /LPF
IMM GRANULOCYTES # BLD AUTO: 0.03 K/UL (ref 0–0.04)
IMM GRANULOCYTES NFR BLD AUTO: 0.4 % (ref 0–0.5)
KETONES UR QL STRIP: NEGATIVE
LACTATE SERPL-SCNC: 0.8 MMOL/L (ref 0.5–2.2)
LACTATE SERPL-SCNC: 0.8 MMOL/L (ref 0.5–2.2)
LDH SERPL L TO P-CCNC: 186 U/L (ref 110–260)
LDH SERPL L TO P-CCNC: 208 U/L (ref 110–260)
LEUKOCYTE ESTERASE UR QL STRIP: ABNORMAL
LYMPHOCYTES # BLD AUTO: 2 K/UL (ref 1–4.8)
LYMPHOCYTES NFR BLD: 29.5 % (ref 18–48)
MCH RBC QN AUTO: 29.2 PG (ref 27–31)
MCHC RBC AUTO-ENTMCNC: 32.5 G/DL (ref 32–36)
MCV RBC AUTO: 90 FL (ref 82–98)
MICROSCOPIC COMMENT: ABNORMAL
MONOCYTES # BLD AUTO: 0.3 K/UL (ref 0.3–1)
MONOCYTES NFR BLD: 4.9 % (ref 4–15)
NEUTROPHILS # BLD AUTO: 4.2 K/UL (ref 1.8–7.7)
NEUTROPHILS NFR BLD: 61.8 % (ref 38–73)
NITRITE UR QL STRIP: NEGATIVE
NRBC BLD-RTO: 0 /100 WBC
PH UR STRIP: 5 [PH] (ref 5–8)
PLATELET # BLD AUTO: 304 K/UL (ref 150–350)
PMV BLD AUTO: 10.6 FL (ref 9.2–12.9)
POTASSIUM SERPL-SCNC: 4.1 MMOL/L (ref 3.5–5.1)
PROCALCITONIN SERPL IA-MCNC: 0.83 NG/ML
PROT SERPL-MCNC: 7.2 G/DL (ref 6–8.4)
PROT UR QL STRIP: ABNORMAL
RBC # BLD AUTO: 4.73 M/UL (ref 4–5.4)
RBC #/AREA URNS HPF: 10 /HPF (ref 0–4)
SARS-COV-2 RDRP RESP QL NAA+PROBE: NEGATIVE
SODIUM SERPL-SCNC: 141 MMOL/L (ref 136–145)
SP GR UR STRIP: 1.01 (ref 1–1.03)
URN SPEC COLLECT METH UR: ABNORMAL
UROBILINOGEN UR STRIP-ACNC: NEGATIVE EU/DL
WBC # BLD AUTO: 6.77 K/UL (ref 3.9–12.7)
WBC #/AREA URNS HPF: >100 /HPF (ref 0–5)

## 2020-07-19 PROCEDURE — 83615 LACTATE (LD) (LDH) ENZYME: CPT

## 2020-07-19 PROCEDURE — 87186 SC STD MICRODIL/AGAR DIL: CPT

## 2020-07-19 PROCEDURE — 87088 URINE BACTERIA CULTURE: CPT

## 2020-07-19 PROCEDURE — 83615 LACTATE (LD) (LDH) ENZYME: CPT | Mod: 91

## 2020-07-19 PROCEDURE — 87086 URINE CULTURE/COLONY COUNT: CPT

## 2020-07-19 PROCEDURE — 86703 HIV-1/HIV-2 1 RESULT ANTBDY: CPT

## 2020-07-19 PROCEDURE — 25000003 PHARM REV CODE 250: Performed by: NURSE PRACTITIONER

## 2020-07-19 PROCEDURE — 85379 FIBRIN DEGRADATION QUANT: CPT

## 2020-07-19 PROCEDURE — 80307 DRUG TEST PRSMV CHEM ANLYZR: CPT

## 2020-07-19 PROCEDURE — 11000001 HC ACUTE MED/SURG PRIVATE ROOM

## 2020-07-19 PROCEDURE — 84145 PROCALCITONIN (PCT): CPT

## 2020-07-19 PROCEDURE — 63600175 PHARM REV CODE 636 W HCPCS: Performed by: HOSPITALIST

## 2020-07-19 PROCEDURE — 80053 COMPREHEN METABOLIC PANEL: CPT

## 2020-07-19 PROCEDURE — 83605 ASSAY OF LACTIC ACID: CPT

## 2020-07-19 PROCEDURE — U0002 COVID-19 LAB TEST NON-CDC: HCPCS

## 2020-07-19 PROCEDURE — 81000 URINALYSIS NONAUTO W/SCOPE: CPT | Mod: 59

## 2020-07-19 PROCEDURE — 86140 C-REACTIVE PROTEIN: CPT

## 2020-07-19 PROCEDURE — 82550 ASSAY OF CK (CPK): CPT

## 2020-07-19 PROCEDURE — 99285 EMERGENCY DEPT VISIT HI MDM: CPT | Mod: 25

## 2020-07-19 PROCEDURE — 87077 CULTURE AEROBIC IDENTIFY: CPT

## 2020-07-19 PROCEDURE — 85025 COMPLETE CBC W/AUTO DIFF WBC: CPT

## 2020-07-19 PROCEDURE — 51798 US URINE CAPACITY MEASURE: CPT

## 2020-07-19 PROCEDURE — U0003 INFECTIOUS AGENT DETECTION BY NUCLEIC ACID (DNA OR RNA); SEVERE ACUTE RESPIRATORY SYNDROME CORONAVIRUS 2 (SARS-COV-2) (CORONAVIRUS DISEASE [COVID-19]), AMPLIFIED PROBE TECHNIQUE, MAKING USE OF HIGH THROUGHPUT TECHNOLOGIES AS DESCRIBED BY CMS-2020-01-R: HCPCS

## 2020-07-19 PROCEDURE — 87040 BLOOD CULTURE FOR BACTERIA: CPT | Mod: 59

## 2020-07-19 PROCEDURE — 82728 ASSAY OF FERRITIN: CPT

## 2020-07-19 PROCEDURE — 25000003 PHARM REV CODE 250: Performed by: HOSPITALIST

## 2020-07-19 PROCEDURE — 85652 RBC SED RATE AUTOMATED: CPT

## 2020-07-19 PROCEDURE — 25000003 PHARM REV CODE 250: Performed by: EMERGENCY MEDICINE

## 2020-07-19 RX ORDER — ALBUTEROL SULFATE 90 UG/1
2 AEROSOL, METERED RESPIRATORY (INHALATION) EVERY 6 HOURS PRN
Status: DISCONTINUED | OUTPATIENT
Start: 2020-07-19 | End: 2020-07-21 | Stop reason: HOSPADM

## 2020-07-19 RX ORDER — CIPROFLOXACIN 2 MG/ML
400 INJECTION, SOLUTION INTRAVENOUS
Status: DISCONTINUED | OUTPATIENT
Start: 2020-07-19 | End: 2020-07-21 | Stop reason: HOSPADM

## 2020-07-19 RX ORDER — ACETAMINOPHEN 325 MG/1
650 TABLET ORAL EVERY 8 HOURS PRN
Status: DISCONTINUED | OUTPATIENT
Start: 2020-07-19 | End: 2020-07-21 | Stop reason: HOSPADM

## 2020-07-19 RX ORDER — PANTOPRAZOLE SODIUM 40 MG/1
40 TABLET, DELAYED RELEASE ORAL DAILY
Status: DISCONTINUED | OUTPATIENT
Start: 2020-07-20 | End: 2020-07-21 | Stop reason: HOSPADM

## 2020-07-19 RX ORDER — OXYBUTYNIN CHLORIDE 5 MG/1
10 TABLET, EXTENDED RELEASE ORAL DAILY
Status: DISCONTINUED | OUTPATIENT
Start: 2020-07-20 | End: 2020-07-21 | Stop reason: HOSPADM

## 2020-07-19 RX ORDER — IBUPROFEN 200 MG
24 TABLET ORAL
Status: DISCONTINUED | OUTPATIENT
Start: 2020-07-19 | End: 2020-07-21 | Stop reason: HOSPADM

## 2020-07-19 RX ORDER — TAMSULOSIN HYDROCHLORIDE 0.4 MG/1
0.4 CAPSULE ORAL DAILY
Status: DISCONTINUED | OUTPATIENT
Start: 2020-07-19 | End: 2020-07-21 | Stop reason: HOSPADM

## 2020-07-19 RX ORDER — IBUPROFEN 200 MG
16 TABLET ORAL
Status: DISCONTINUED | OUTPATIENT
Start: 2020-07-19 | End: 2020-07-21 | Stop reason: HOSPADM

## 2020-07-19 RX ORDER — ACETAMINOPHEN 325 MG/1
650 TABLET ORAL EVERY 4 HOURS PRN
Status: DISCONTINUED | OUTPATIENT
Start: 2020-07-19 | End: 2020-07-21 | Stop reason: HOSPADM

## 2020-07-19 RX ORDER — ENOXAPARIN SODIUM 100 MG/ML
40 INJECTION SUBCUTANEOUS EVERY 24 HOURS
Status: DISCONTINUED | OUTPATIENT
Start: 2020-07-19 | End: 2020-07-19

## 2020-07-19 RX ORDER — ONDANSETRON 8 MG/1
8 TABLET, ORALLY DISINTEGRATING ORAL EVERY 8 HOURS PRN
Status: DISCONTINUED | OUTPATIENT
Start: 2020-07-19 | End: 2020-07-21 | Stop reason: HOSPADM

## 2020-07-19 RX ORDER — SODIUM CHLORIDE 0.9 % (FLUSH) 0.9 %
10 SYRINGE (ML) INJECTION
Status: DISCONTINUED | OUTPATIENT
Start: 2020-07-19 | End: 2020-07-21 | Stop reason: HOSPADM

## 2020-07-19 RX ORDER — SODIUM CHLORIDE 9 MG/ML
INJECTION, SOLUTION INTRAVENOUS CONTINUOUS
Status: DISCONTINUED | OUTPATIENT
Start: 2020-07-19 | End: 2020-07-21

## 2020-07-19 RX ORDER — HYDRALAZINE HYDROCHLORIDE 20 MG/ML
10 INJECTION INTRAMUSCULAR; INTRAVENOUS EVERY 8 HOURS PRN
Status: DISCONTINUED | OUTPATIENT
Start: 2020-07-19 | End: 2020-07-21 | Stop reason: HOSPADM

## 2020-07-19 RX ORDER — ALPRAZOLAM 0.5 MG/1
1 TABLET ORAL DAILY PRN
Status: DISCONTINUED | OUTPATIENT
Start: 2020-07-19 | End: 2020-07-19

## 2020-07-19 RX ORDER — ENOXAPARIN SODIUM 100 MG/ML
40 INJECTION SUBCUTANEOUS EVERY 24 HOURS
Status: DISCONTINUED | OUTPATIENT
Start: 2020-07-20 | End: 2020-07-21 | Stop reason: HOSPADM

## 2020-07-19 RX ORDER — GLUCAGON 1 MG
1 KIT INJECTION
Status: DISCONTINUED | OUTPATIENT
Start: 2020-07-19 | End: 2020-07-21 | Stop reason: HOSPADM

## 2020-07-19 RX ORDER — KETOROLAC TROMETHAMINE 30 MG/ML
15 INJECTION, SOLUTION INTRAMUSCULAR; INTRAVENOUS EVERY 6 HOURS PRN
Status: DISCONTINUED | OUTPATIENT
Start: 2020-07-19 | End: 2020-07-21 | Stop reason: HOSPADM

## 2020-07-19 RX ORDER — ALBUTEROL SULFATE 90 UG/1
2 AEROSOL, METERED RESPIRATORY (INHALATION) EVERY 8 HOURS
Status: DISCONTINUED | OUTPATIENT
Start: 2020-07-20 | End: 2020-07-19

## 2020-07-19 RX ORDER — ALPRAZOLAM 0.5 MG/1
1 TABLET ORAL 2 TIMES DAILY
Status: DISCONTINUED | OUTPATIENT
Start: 2020-07-19 | End: 2020-07-19

## 2020-07-19 RX ORDER — BUTALBITAL, ACETAMINOPHEN AND CAFFEINE 50; 325; 40 MG/1; MG/1; MG/1
1 TABLET ORAL
Status: COMPLETED | OUTPATIENT
Start: 2020-07-19 | End: 2020-07-19

## 2020-07-19 RX ADMIN — BUTALBITAL, ACETAMINOPHEN, AND CAFFEINE 1 TABLET: 50; 325; 40 TABLET ORAL at 04:07

## 2020-07-19 RX ADMIN — TAMSULOSIN HYDROCHLORIDE 0.4 MG: 0.4 CAPSULE ORAL at 09:07

## 2020-07-19 RX ADMIN — ONDANSETRON 8 MG: 8 TABLET, ORALLY DISINTEGRATING ORAL at 10:07

## 2020-07-19 RX ADMIN — CIPROFLOXACIN 400 MG: 2 INJECTION, SOLUTION INTRAVENOUS at 09:07

## 2020-07-19 RX ADMIN — SODIUM CHLORIDE: 0.9 INJECTION, SOLUTION INTRAVENOUS at 09:07

## 2020-07-19 NOTE — ASSESSMENT & PLAN NOTE
-History noted  -xanax not on  and per Pain Management Dr. Holbrook-wean off. Will not start for now.   -Hold citalopram for now given treatment with cipro and interaction

## 2020-07-19 NOTE — ASSESSMENT & PLAN NOTE
"-History noted. Stated see [facility] Neurologist but I do not see in care everywhere. Reports "was passing out" and last episode 1 year ago.    -Not on anti-epileptic medications at home.   -Follow up with [facility] Neurologist outpatient    "

## 2020-07-19 NOTE — ASSESSMENT & PLAN NOTE
-Mrs. Harrison is admitted to inpatient status  -Noted 2/4 bottles from blood cultures two days ago (7/16/20) in ER positive for E.coli. Urine culture not collected at that time.   -While not present two days ago and no symptoms of UTI presently, UA now suggestive of UTI.  Noted history of nephrolithiasis and with left flank pain. Non toxic appearing at this time.   -Noted lack of leukocytosis and not septic at this time.    -CT Abd/pelvis renal stone showed mod hydronephrosis on the left secondary to 7/8 mm stone in the proximal left ureter and mild perinephric stranding on the left  -Check echo, rapid hiv, procalcitonin  -Blood cultures and urine cultures obtained in ER  -Will treat with gentle IV fluids and cipro  -Start flomax ; not sure if 7x 8 mm will pass though  -Keep NPO p MN until seen by Urology.   -Consult ID and Urology in AM for recommendations.

## 2020-07-19 NOTE — ASSESSMENT & PLAN NOTE
-History noted. Reports on fentanyl patch but I do not see on . Will not start at this time. Toradol PRN

## 2020-07-19 NOTE — ASSESSMENT & PLAN NOTE
-History noted. Very controlled. Use approximately once every 3 months  -Provide albuterol MDI as needed

## 2020-07-19 NOTE — ED PROVIDER NOTES
"Encounter Date: 2020    SCRIBE #1 NOTE: I, Elsy Roman, am scribing for, and in the presence of,  Kamilla Rivera MD. I have scribed the following portions of the note - Other sections scribed: HPI, ROS, PE.       History     Chief Complaint   Patient presents with    Abnormal Lab     Positive blood culture - E.Coli in 1 of 2 bottles     CC: Abnormal lab    HPI: This is a 55 y.o. female with a PMHx of hypertension and asthma who presents to the Emergency Department with a cc of an abnormal lab. Patient states she was evaluated in the ED four days ago for a cough and she received a call that she had a positive blood culture - e.coli in 1 of 2 bottles. Her current symptoms include fever, nausea, sore throat, a nonproductive cough, shortness of breath, and chest pain. She denieas dysuria, diarrhea, vomiting, rhinorrhea, or abdominal pain. Shortness of breath and chest pain are worsened with coughing. No alleviating factors noted. Patient reports no piror history of similar symptoms. She is not a smoker and is allergic to Penicillin. She reports fever yesterday to 100.8, took acetaminophen this morning. + fatigue, +anorexia. + nausea and "gagging" no vomiting.     The history is provided by the patient. No  was used.     Review of patient's allergies indicates:   Allergen Reactions    Penicillins Itching     Past Medical History:   Diagnosis Date    Asthma     Brain mass     Depression     Fibromyalgia     Hypertension     Seizures     fell and hit head at grocery        Past Surgical History:   Procedure Laterality Date    CARPAL TUNNEL RELEASE      right     SECTION      x 3    cysto/ureteroscopy stent placement      HYSTERECTOMY      TONSILLECTOMY       Family History   Problem Relation Age of Onset    Cancer Mother         Breast cancer    Heart disease Mother     Diabetes Father     Cancer Maternal Grandmother         Lung Cancer    Cancer Maternal " Grandfather         Lung Cancer    Cancer Paternal Grandmother     Cancer Paternal Grandfather      Social History     Tobacco Use    Smoking status: Never Smoker    Smokeless tobacco: Never Used   Substance Use Topics    Alcohol use: Yes     Comment: occasionally    Drug use: No     Review of Systems   Constitutional: Positive for fever. Negative for chills and diaphoresis.   HENT: Positive for sore throat. Negative for congestion and rhinorrhea.    Eyes: Negative for photophobia and visual disturbance.   Respiratory: Positive for cough and shortness of breath.    Cardiovascular: Positive for chest pain. Negative for leg swelling.   Gastrointestinal: Positive for nausea. Negative for abdominal pain, blood in stool, constipation, diarrhea and vomiting.   Genitourinary: Negative for dysuria, flank pain, frequency, hematuria and urgency.   Musculoskeletal: Negative for back pain, neck pain and neck stiffness.   Skin: Negative for rash and wound.   Neurological: Negative for dizziness, weakness, light-headedness, numbness and headaches.   Psychiatric/Behavioral: Negative for confusion and suicidal ideas.   All other systems reviewed and are negative.      Physical Exam     Initial Vitals [07/19/20 1411]   BP Pulse Resp Temp SpO2   (!) 126/94 90 18 98.7 °F (37.1 °C) 97 %      MAP       --         Physical Exam    Nursing note and vitals reviewed.  Constitutional: She appears well-developed and well-nourished. She is not diaphoretic. No distress.   HENT:   Head: Normocephalic and atraumatic.   Right Ear: External ear normal.   Left Ear: External ear normal.   Mouth/Throat: Oropharynx is clear and moist. No oropharyngeal exudate.   Eyes: Conjunctivae and EOM are normal. Pupils are equal, round, and reactive to light. Right eye exhibits no discharge. Left eye exhibits no discharge.   Neck: Normal range of motion. Neck supple. No JVD present.   Cardiovascular: Normal rate, regular rhythm, normal heart sounds and  intact distal pulses. Exam reveals no gallop and no friction rub.    No murmur heard.  Pulmonary/Chest: Breath sounds normal. No respiratory distress. She has no wheezes. She has no rhonchi. She has no rales.   Pt is coughing on exam   Abdominal: Soft. Bowel sounds are normal. She exhibits no distension. There is no abdominal tenderness. There is no rebound and no guarding.   Negative Roberts's sign, no CVA tenderness   Musculoskeletal: Normal range of motion. No tenderness or edema.   Lymphadenopathy:     She has no cervical adenopathy.   Neurological: She is alert and oriented to person, place, and time. No cranial nerve deficit. GCS score is 15. GCS eye subscore is 4. GCS verbal subscore is 5. GCS motor subscore is 6.   Skin: Skin is warm and dry. Capillary refill takes less than 2 seconds.   Psychiatric: She has a normal mood and affect. Her behavior is normal. Thought content normal.         ED Course   Procedures  Labs Reviewed   COMPREHENSIVE METABOLIC PANEL - Abnormal; Notable for the following components:       Result Value    Albumin 3.2 (*)     AST 53 (*)     All other components within normal limits    Narrative:     Recoll. [id number] by VALERI at 07/19/2020 15:16, reason:   HEMOLYZED/DISCARDED/VERN   URINALYSIS, REFLEX TO URINE CULTURE - Abnormal; Notable for the following components:    Appearance, UA Cloudy (*)     Protein, UA 2+ (*)     Occult Blood UA 2+ (*)     Leukocytes, UA 3+ (*)     All other components within normal limits    Narrative:     Specimen Source->Urine   URINALYSIS MICROSCOPIC - Abnormal; Notable for the following components:    RBC, UA 10 (*)     WBC, UA >100 (*)     Bacteria Moderate (*)     All other components within normal limits    Narrative:     Specimen Source->Urine   C-REACTIVE PROTEIN - Abnormal; Notable for the following components:    CRP 57.7 (*)     All other components within normal limits   D DIMER, QUANTITATIVE - Abnormal; Notable for the following components:     D-Dimer 1.87 (*)     All other components within normal limits   CULTURE, BLOOD   CULTURE, BLOOD   CULTURE, URINE   CULTURE, RESPIRATORY   LACTIC ACID, PLASMA   CBC W/ AUTO DIFFERENTIAL   LACTIC ACID, PLASMA    Narrative:     Recoll. [id number] by LM1 at 07/19/2020 15:16, reason:   HEMOLYZED/DISCARDED/VERN   SARS-COV-2 RNA AMPLIFICATION, QUAL   LACTATE DEHYDROGENASE   CK   LACTATE DEHYDROGENASE   LACTATE DEHYDROGENASE   CK    Narrative:     Recoll. [id number] by LM1 at 07/19/2020 15:16, reason:   HEMOLYZED/DISCARDED/VERN   DRUG SCREEN PANEL, URINE EMERGENCY   SEDIMENTATION RATE   SARS-COV-2 (COVID-19) QUALITATIVE PCR   DRUG SCREEN PANEL, URINE EMERGENCY          Imaging Results           CT Renal Stone Study Abd Pelvis WO (Final result)  Result time 07/19/20 18:55:24    Final result by Azam Alexis MD (07/19/20 18:55:24)                 Impression:      1. Moderate hydronephrosis on the left secondary to a 7 x 8 mm stone in the proximal left ureter.  Recommend follow-up.  2. Mild diverticulosis of the colon with no evidence of acute diverticulitis.  3. Trace pericardial effusion.  4. Small fat containing umbilical hernia.  5.  This report was flagged in Epic as abnormal.      Electronically signed by: Azam Alexis  Date:    07/19/2020  Time:    18:55             Narrative:    EXAMINATION:  CT RENAL STONE STUDY ABD PELVIS WO    CLINICAL HISTORY:  Flank pain, kidney stone suspected;    TECHNIQUE:  Low dose axial images, sagittal and coronal reformations were obtained from the lung bases to the pubic symphysis.  Contrast was not administered.    COMPARISON:  04/23/2015    FINDINGS:  Heart: The heart is normal size.  Trace pericardial effusion.    Lung Bases: Well aerated, without consolidation or pleural fluid.    Liver: Normal in size and attenuation, with no focal hepatic lesions.    Gallbladder: No calcified gallstones.    Bile Ducts: No evidence of dilated ducts.    Pancreas: No mass or peripancreatic  fat stranding.    Spleen: Unremarkable.    Adrenals: Unremarkable.    Kidneys/ Ureters: Moderate hydronephrosis on the left secondary to a 7 x 8 mm stone in the proximal left ureter.  Mild perinephric stranding on the left.  No other stones are present in the left kidney.    The right kidney is within normal limits.    Bladder: No evidence of wall thickening.    Reproductive organs: The uterus is present.  No adnexal mass.    GI Tract/Mesentery: No evidence of bowel obstruction or inflammation.  Mild diverticulosis of the colon.  No evidence of acute diverticulitis.  Mild retained feces throughout the colon.    The appendix is within normal limits.    Peritoneal Space: No ascites. No free air.    Retroperitoneum: No significant adenopathy.    Abdominal wall: Small fat containing umbilical hernia.    Vasculature: No significant atherosclerosis or aneurysm.    Bones: No acute fracture.  There is facet arthropathy at L4-5 and L5-S1 bilaterally.                               X-Ray Chest AP Portable (Final result)  Result time 07/19/20 14:55:15    Final result by Isidro Vincent MD (07/19/20 14:55:15)                 Impression:      No acute findings.      Electronically signed by: Isidro Vincent MD  Date:    07/19/2020  Time:    14:55             Narrative:    EXAMINATION:  XR CHEST AP PORTABLE    CLINICAL HISTORY:  Cough    TECHNIQUE:  Single frontal view of the chest was performed.    COMPARISON:  07/16/2020    FINDINGS:  The cardiomediastinal contour is within normal limits.  Lungs are clear.  No pneumothorax or pleural effusions.                            MDM  55-year-old female presents after she was called back for positive E coli blood culture she has been febrile at home was recently yesterday.  She denies any abdominal pain, urinary symptoms.  She does report some nausea and gagging but her biggest complaint is cough and viral URI like symptoms.  Her COVID test was negative x2.  On exam patient abdomen soft  nontender no CVA tenderness.  Negative Roberts sign.  She has a dry cough on exam.  Lung sounds are clear.  She is not tachycardic she is not febrile.  Differential diagnosis includes was not limited to bacteremia, urinary tract infection, contaminant, intra-abdominal infection.  Obtained repeat blood cultures.  Lactate within normal limits.  Urinalysis is now grossly infected with greater than 100 white blood cells.  Patient continues to deny symptoms.  I will admit the patient with Dr. Woody for IV antibiotics and repeat blood cultures.  He will place further admission orders and admit the patient for further workup and treatment.  Patient was in agreement plan and all questions answered.                Scribe Attestation:   Scribe #1: I performed the above scribed service and the documentation accurately describes the services I performed. I attest to the accuracy of the note.                          Clinical Impression:     1. E coli bacteremia    2. Cough    3. Bacteremia    4. Urinary tract infection with hematuria, site unspecified                ED Disposition Condition    Admit            I, Kamilla Rivera, personally performed the services described in this documentation. All medical record entries made by the scribe were at my direction and in my presence. I have reviewed the chart and agree that the record reflects my personal performance and is accurate and complete.                 Kamilla Rivera MD  07/19/20 2058

## 2020-07-19 NOTE — ASSESSMENT & PLAN NOTE
-2015 left ureter stone. Per Urology , status post Uri's, attempted stone extraction and ureter stent placement however postop CT showed residual stone in kidney and non obstructive at that time.  -CT renal stone AP shows moderate hydronephrosis on the left secondary to a 7 x 8 mm stone in the proximal left ureter and mild perinephric stranding on the left.  -start flomax  -Keep NPO  -Discussed with Urology above. If develops fever or worsen in symptoms please call Dr. Brice back tonight-Discussed with our Nocturnist.

## 2020-07-19 NOTE — ED TRIAGE NOTES
Seen x 4 days ago.  Called today told to come in for repeat bloodwork.  Dry cough x 3 weeks.  + nausea.  Denies vomiting, diarrhea or fever.  Sob w/ chest pains.  Sob all the time worse with cough.  Chest pains w/ coughing. Denies radiation.  Tylenol for fever this am.

## 2020-07-19 NOTE — ASSESSMENT & PLAN NOTE
-Has significant cough and apparent significant upper airway viral infection  -Rapid covid is negative and no leukocytosis, hypoxia or infiltration on CXR.  -Check routine screening covid, d-dimer, crp, ldh and ferritin  -Tessalon PRN cough  -Maintain covid isolation for now.

## 2020-07-20 ENCOUNTER — TELEPHONE (OUTPATIENT)
Dept: UROLOGY | Facility: CLINIC | Age: 55
End: 2020-07-20

## 2020-07-20 ENCOUNTER — ANESTHESIA EVENT (OUTPATIENT)
Dept: SURGERY | Facility: HOSPITAL | Age: 55
DRG: 660 | End: 2020-07-20
Payer: COMMERCIAL

## 2020-07-20 ENCOUNTER — ANESTHESIA (OUTPATIENT)
Dept: SURGERY | Facility: HOSPITAL | Age: 55
DRG: 660 | End: 2020-07-20
Payer: COMMERCIAL

## 2020-07-20 PROBLEM — B96.20 E COLI BACTEREMIA: Status: RESOLVED | Noted: 2020-07-20 | Resolved: 2020-07-20

## 2020-07-20 PROBLEM — R78.81 E COLI BACTEREMIA: Status: RESOLVED | Noted: 2020-07-20 | Resolved: 2020-07-20

## 2020-07-20 PROBLEM — B96.20 E. COLI UTI: Status: ACTIVE | Noted: 2020-07-19

## 2020-07-20 PROBLEM — B96.20 E COLI BACTEREMIA: Status: ACTIVE | Noted: 2020-07-20

## 2020-07-20 PROBLEM — R78.81 E COLI BACTEREMIA: Status: ACTIVE | Noted: 2020-07-20

## 2020-07-20 LAB
ALBUMIN SERPL BCP-MCNC: 3 G/DL (ref 3.5–5.2)
ALP SERPL-CCNC: 61 U/L (ref 55–135)
ALT SERPL W/O P-5'-P-CCNC: 33 U/L (ref 10–44)
AMPHET+METHAMPHET UR QL: NEGATIVE
ANION GAP SERPL CALC-SCNC: 7 MMOL/L (ref 8–16)
AORTIC ROOT ANNULUS: 3.05 CM
AORTIC VALVE CUSP SEPERATION: 1.95 CM
ASCENDING AORTA: 2.65 CM
AST SERPL-CCNC: 41 U/L (ref 10–40)
AV INDEX (PROSTH): 0.65
AV MEAN GRADIENT: 3 MMHG
AV PEAK GRADIENT: 8 MMHG
AV VALVE AREA: 2.86 CM2
AV VELOCITY RATIO: 0.61
BARBITURATES UR QL SCN>200 NG/ML: NORMAL
BASOPHILS # BLD AUTO: 0.03 K/UL (ref 0–0.2)
BASOPHILS NFR BLD: 0.5 % (ref 0–1.9)
BENZODIAZ UR QL SCN>200 NG/ML: NEGATIVE
BILIRUB SERPL-MCNC: 0.3 MG/DL (ref 0.1–1)
BSA FOR ECHO PROCEDURE: 1.78 M2
BUN SERPL-MCNC: 11 MG/DL (ref 6–20)
BZE UR QL SCN: NEGATIVE
CALCIUM SERPL-MCNC: 8.9 MG/DL (ref 8.7–10.5)
CANNABINOIDS UR QL SCN: NEGATIVE
CHLORIDE SERPL-SCNC: 108 MMOL/L (ref 95–110)
CO2 SERPL-SCNC: 27 MMOL/L (ref 23–29)
CREAT SERPL-MCNC: 0.9 MG/DL (ref 0.5–1.4)
CREAT UR-MCNC: 78.3 MG/DL (ref 15–325)
CV ECHO LV RWT: 0.42 CM
DIFFERENTIAL METHOD: ABNORMAL
DOP CALC AO PEAK VEL: 1.4 M/S
DOP CALC AO VTI: 25.49 CM
DOP CALC LVOT AREA: 4.4 CM2
DOP CALC LVOT DIAMETER: 2.37 CM
DOP CALC LVOT PEAK VEL: 0.86 M/S
DOP CALC LVOT STROKE VOLUME: 72.97 CM3
DOP CALCLVOT PEAK VEL VTI: 16.55 CM
E WAVE DECELERATION TIME: 264.03 MSEC
E/A RATIO: 1.31
ECHO LV POSTERIOR WALL: 0.96 CM (ref 0.6–1.1)
EOSINOPHIL # BLD AUTO: 0.2 K/UL (ref 0–0.5)
EOSINOPHIL NFR BLD: 2.6 % (ref 0–8)
ERYTHROCYTE [DISTWIDTH] IN BLOOD BY AUTOMATED COUNT: 12.1 % (ref 11.5–14.5)
EST. GFR  (AFRICAN AMERICAN): >60 ML/MIN/1.73 M^2
EST. GFR  (NON AFRICAN AMERICAN): >60 ML/MIN/1.73 M^2
FRACTIONAL SHORTENING: 29 % (ref 28–44)
GLUCOSE SERPL-MCNC: 87 MG/DL (ref 70–110)
HCT VFR BLD AUTO: 36.7 % (ref 37–48.5)
HGB BLD-MCNC: 12 G/DL (ref 12–16)
IMM GRANULOCYTES # BLD AUTO: 0.03 K/UL (ref 0–0.04)
IMM GRANULOCYTES NFR BLD AUTO: 0.5 % (ref 0–0.5)
INTERVENTRICULAR SEPTUM: 0.84 CM (ref 0.6–1.1)
IVRT: 108.42 MSEC
LA MAJOR: 5.26 CM
LA MINOR: 5.24 CM
LA WIDTH: 3.76 CM
LEFT ATRIUM SIZE: 3.55 CM
LEFT ATRIUM VOLUME INDEX: 33.6 ML/M2
LEFT ATRIUM VOLUME: 59.57 CM3
LEFT INTERNAL DIMENSION IN SYSTOLE: 3.24 CM (ref 2.1–4)
LEFT VENTRICLE DIASTOLIC VOLUME INDEX: 54.4 ML/M2
LEFT VENTRICLE DIASTOLIC VOLUME: 96.45 ML
LEFT VENTRICLE MASS INDEX: 77 G/M2
LEFT VENTRICLE SYSTOLIC VOLUME INDEX: 23.8 ML/M2
LEFT VENTRICLE SYSTOLIC VOLUME: 42.15 ML
LEFT VENTRICULAR INTERNAL DIMENSION IN DIASTOLE: 4.58 CM (ref 3.5–6)
LEFT VENTRICULAR MASS: 136.73 G
LYMPHOCYTES # BLD AUTO: 2 K/UL (ref 1–4.8)
LYMPHOCYTES NFR BLD: 29.5 % (ref 18–48)
MAGNESIUM SERPL-MCNC: 2 MG/DL (ref 1.6–2.6)
MCH RBC QN AUTO: 29.3 PG (ref 27–31)
MCHC RBC AUTO-ENTMCNC: 32.7 G/DL (ref 32–36)
MCV RBC AUTO: 90 FL (ref 82–98)
METHADONE UR QL SCN>300 NG/ML: NEGATIVE
MONOCYTES # BLD AUTO: 0.4 K/UL (ref 0.3–1)
MONOCYTES NFR BLD: 5.6 % (ref 4–15)
MV PEAK A VEL: 0.62 M/S
MV PEAK E VEL: 0.81 M/S
MV STENOSIS PRESSURE HALF TIME: 78.7 MS
MV VALVE AREA P 1/2 METHOD: 2.8 CM2
NEUTROPHILS # BLD AUTO: 4.1 K/UL (ref 1.8–7.7)
NEUTROPHILS NFR BLD: 61.3 % (ref 38–73)
NRBC BLD-RTO: 0 /100 WBC
OPIATES UR QL SCN: NEGATIVE
PCP UR QL SCN>25 NG/ML: NEGATIVE
PISA TR MAX VEL: 2.14 M/S
PLATELET # BLD AUTO: 261 K/UL (ref 150–350)
PMV BLD AUTO: 10.1 FL (ref 9.2–12.9)
POTASSIUM SERPL-SCNC: 3.5 MMOL/L (ref 3.5–5.1)
PROT SERPL-MCNC: 6.5 G/DL (ref 6–8.4)
PULM VEIN S/D RATIO: 1.62
PV PEAK D VEL: 0.26 M/S
PV PEAK S VEL: 0.42 M/S
PV PEAK VELOCITY: 0.88 CM/S
RA MAJOR: 4.58 CM
RA WIDTH: 3.6 CM
RBC # BLD AUTO: 4.09 M/UL (ref 4–5.4)
RIGHT VENTRICULAR END-DIASTOLIC DIMENSION: 3.23 CM
SARS-COV-2 RNA RESP QL NAA+PROBE: NOT DETECTED
SINUS: 3.32 CM
SODIUM SERPL-SCNC: 142 MMOL/L (ref 136–145)
STJ: 2.66 CM
TOXICOLOGY INFORMATION: NORMAL
TR MAX PG: 18 MMHG
TRICUSPID ANNULAR PLANE SYSTOLIC EXCURSION: 1.73 CM
WBC # BLD AUTO: 6.62 K/UL (ref 3.9–12.7)

## 2020-07-20 PROCEDURE — 25000003 PHARM REV CODE 250: Performed by: UROLOGY

## 2020-07-20 PROCEDURE — 25500020 PHARM REV CODE 255: Performed by: UROLOGY

## 2020-07-20 PROCEDURE — C1758 CATHETER, URETERAL: HCPCS | Performed by: UROLOGY

## 2020-07-20 PROCEDURE — 37000008 HC ANESTHESIA 1ST 15 MINUTES: Performed by: UROLOGY

## 2020-07-20 PROCEDURE — 63600175 PHARM REV CODE 636 W HCPCS: Performed by: NURSE ANESTHETIST, CERTIFIED REGISTERED

## 2020-07-20 PROCEDURE — 37000009 HC ANESTHESIA EA ADD 15 MINS: Performed by: UROLOGY

## 2020-07-20 PROCEDURE — C1769 GUIDE WIRE: HCPCS | Performed by: UROLOGY

## 2020-07-20 PROCEDURE — 80053 COMPREHEN METABOLIC PANEL: CPT

## 2020-07-20 PROCEDURE — 63600175 PHARM REV CODE 636 W HCPCS: Performed by: UROLOGY

## 2020-07-20 PROCEDURE — C2617 STENT, NON-COR, TEM W/O DEL: HCPCS | Performed by: UROLOGY

## 2020-07-20 PROCEDURE — D9220A PRA ANESTHESIA: ICD-10-PCS | Mod: CRNA,,, | Performed by: NURSE ANESTHETIST, CERTIFIED REGISTERED

## 2020-07-20 PROCEDURE — 25000003 PHARM REV CODE 250: Performed by: NURSE PRACTITIONER

## 2020-07-20 PROCEDURE — 36000706: Performed by: UROLOGY

## 2020-07-20 PROCEDURE — D9220A PRA ANESTHESIA: Mod: CRNA,,, | Performed by: NURSE ANESTHETIST, CERTIFIED REGISTERED

## 2020-07-20 PROCEDURE — 25000003 PHARM REV CODE 250: Performed by: HOSPITALIST

## 2020-07-20 PROCEDURE — 99253 IP/OBS CNSLTJ NEW/EST LOW 45: CPT | Mod: ,,, | Performed by: INTERNAL MEDICINE

## 2020-07-20 PROCEDURE — D9220A PRA ANESTHESIA: Mod: ANES,,, | Performed by: ANESTHESIOLOGY

## 2020-07-20 PROCEDURE — 36000707: Performed by: UROLOGY

## 2020-07-20 PROCEDURE — D9220A PRA ANESTHESIA: ICD-10-PCS | Mod: ANES,,, | Performed by: ANESTHESIOLOGY

## 2020-07-20 PROCEDURE — 63600175 PHARM REV CODE 636 W HCPCS: Performed by: ANESTHESIOLOGY

## 2020-07-20 PROCEDURE — 11000001 HC ACUTE MED/SURG PRIVATE ROOM

## 2020-07-20 PROCEDURE — 71000033 HC RECOVERY, INTIAL HOUR: Performed by: UROLOGY

## 2020-07-20 PROCEDURE — 52332 CYSTOSCOPY AND TREATMENT: CPT | Mod: LT,,, | Performed by: UROLOGY

## 2020-07-20 PROCEDURE — 52332 PR CYSTOSCOPY,INSERT URETERAL STENT: ICD-10-PCS | Mod: LT,,, | Performed by: UROLOGY

## 2020-07-20 PROCEDURE — 74420 UROGRAPHY RTRGR +-KUB: CPT | Mod: 26,,, | Performed by: UROLOGY

## 2020-07-20 PROCEDURE — 63600175 PHARM REV CODE 636 W HCPCS: Performed by: HOSPITALIST

## 2020-07-20 PROCEDURE — 83735 ASSAY OF MAGNESIUM: CPT

## 2020-07-20 PROCEDURE — 85025 COMPLETE CBC W/AUTO DIFF WBC: CPT

## 2020-07-20 PROCEDURE — 36415 COLL VENOUS BLD VENIPUNCTURE: CPT

## 2020-07-20 PROCEDURE — 99223 1ST HOSP IP/OBS HIGH 75: CPT | Mod: 25,,, | Performed by: UROLOGY

## 2020-07-20 PROCEDURE — 99223 PR INITIAL HOSPITAL CARE,LEVL III: ICD-10-PCS | Mod: 25,,, | Performed by: UROLOGY

## 2020-07-20 PROCEDURE — 94761 N-INVAS EAR/PLS OXIMETRY MLT: CPT

## 2020-07-20 PROCEDURE — 74420 PR  X-RAY RETROGRADE PYELOGRAM: ICD-10-PCS | Mod: 26,,, | Performed by: UROLOGY

## 2020-07-20 PROCEDURE — 99253 PR INITIAL INPATIENT CONSULT,LEVL III: ICD-10-PCS | Mod: ,,, | Performed by: INTERNAL MEDICINE

## 2020-07-20 DEVICE — STENT URET PERCUFLEX 6FR 24CM: Type: IMPLANTABLE DEVICE | Site: URETER | Status: FUNCTIONAL

## 2020-07-20 RX ORDER — SODIUM CHLORIDE 0.9 % (FLUSH) 0.9 %
3 SYRINGE (ML) INJECTION
Status: DISCONTINUED | OUTPATIENT
Start: 2020-07-20 | End: 2020-07-20 | Stop reason: HOSPADM

## 2020-07-20 RX ORDER — OXYBUTYNIN CHLORIDE 5 MG/1
5 TABLET ORAL 3 TIMES DAILY PRN
Status: DISCONTINUED | OUTPATIENT
Start: 2020-07-20 | End: 2020-07-21 | Stop reason: HOSPADM

## 2020-07-20 RX ORDER — PHENAZOPYRIDINE HYDROCHLORIDE 100 MG/1
100 TABLET, FILM COATED ORAL
Status: DISCONTINUED | OUTPATIENT
Start: 2020-07-20 | End: 2020-07-21 | Stop reason: HOSPADM

## 2020-07-20 RX ORDER — PHENYLEPHRINE HYDROCHLORIDE 10 MG/ML
INJECTION INTRAVENOUS
Status: DISCONTINUED | OUTPATIENT
Start: 2020-07-20 | End: 2020-07-20

## 2020-07-20 RX ORDER — ONDANSETRON 2 MG/ML
INJECTION INTRAMUSCULAR; INTRAVENOUS
Status: DISCONTINUED | OUTPATIENT
Start: 2020-07-20 | End: 2020-07-20

## 2020-07-20 RX ORDER — HYDROMORPHONE HYDROCHLORIDE 2 MG/ML
0.2 INJECTION, SOLUTION INTRAMUSCULAR; INTRAVENOUS; SUBCUTANEOUS EVERY 5 MIN PRN
Status: DISCONTINUED | OUTPATIENT
Start: 2020-07-20 | End: 2020-07-20 | Stop reason: HOSPADM

## 2020-07-20 RX ORDER — LIDOCAINE HYDROCHLORIDE 20 MG/ML
INJECTION INTRAVENOUS
Status: DISCONTINUED | OUTPATIENT
Start: 2020-07-20 | End: 2020-07-20

## 2020-07-20 RX ORDER — DEXAMETHASONE SODIUM PHOSPHATE 4 MG/ML
INJECTION, SOLUTION INTRA-ARTICULAR; INTRALESIONAL; INTRAMUSCULAR; INTRAVENOUS; SOFT TISSUE
Status: DISCONTINUED | OUTPATIENT
Start: 2020-07-20 | End: 2020-07-20

## 2020-07-20 RX ORDER — ACETAMINOPHEN 10 MG/ML
1000 INJECTION, SOLUTION INTRAVENOUS ONCE
Status: COMPLETED | OUTPATIENT
Start: 2020-07-20 | End: 2020-07-20

## 2020-07-20 RX ORDER — MIDAZOLAM HYDROCHLORIDE 1 MG/ML
INJECTION, SOLUTION INTRAMUSCULAR; INTRAVENOUS
Status: DISCONTINUED | OUTPATIENT
Start: 2020-07-20 | End: 2020-07-20

## 2020-07-20 RX ORDER — PROPOFOL 10 MG/ML
VIAL (ML) INTRAVENOUS
Status: DISCONTINUED | OUTPATIENT
Start: 2020-07-20 | End: 2020-07-20

## 2020-07-20 RX ORDER — TALC
6 POWDER (GRAM) TOPICAL NIGHTLY PRN
Status: DISCONTINUED | OUTPATIENT
Start: 2020-07-20 | End: 2020-07-21 | Stop reason: HOSPADM

## 2020-07-20 RX ORDER — FENTANYL CITRATE 50 UG/ML
INJECTION, SOLUTION INTRAMUSCULAR; INTRAVENOUS
Status: DISCONTINUED | OUTPATIENT
Start: 2020-07-20 | End: 2020-07-20

## 2020-07-20 RX ORDER — FENTANYL CITRATE 50 UG/ML
25 INJECTION, SOLUTION INTRAMUSCULAR; INTRAVENOUS EVERY 5 MIN PRN
Status: DISCONTINUED | OUTPATIENT
Start: 2020-07-20 | End: 2020-07-20 | Stop reason: HOSPADM

## 2020-07-20 RX ORDER — SODIUM CHLORIDE, SODIUM LACTATE, POTASSIUM CHLORIDE, CALCIUM CHLORIDE 600; 310; 30; 20 MG/100ML; MG/100ML; MG/100ML; MG/100ML
INJECTION, SOLUTION INTRAVENOUS CONTINUOUS PRN
Status: DISCONTINUED | OUTPATIENT
Start: 2020-07-20 | End: 2020-07-20

## 2020-07-20 RX ADMIN — Medication 6 MG: at 01:07

## 2020-07-20 RX ADMIN — PROMETHAZINE HYDROCHLORIDE 6.25 MG: 25 INJECTION INTRAMUSCULAR; INTRAVENOUS at 12:07

## 2020-07-20 RX ADMIN — ONDANSETRON 4 MG: 2 INJECTION, SOLUTION INTRAMUSCULAR; INTRAVENOUS at 12:07

## 2020-07-20 RX ADMIN — CIPROFLOXACIN 400 MG: 2 INJECTION, SOLUTION INTRAVENOUS at 06:07

## 2020-07-20 RX ADMIN — FENTANYL CITRATE 50 MCG: 50 INJECTION INTRAMUSCULAR; INTRAVENOUS at 12:07

## 2020-07-20 RX ADMIN — Medication 6 MG: at 08:07

## 2020-07-20 RX ADMIN — ENOXAPARIN SODIUM 40 MG: 40 INJECTION SUBCUTANEOUS at 05:07

## 2020-07-20 RX ADMIN — Medication 100 MG: at 12:07

## 2020-07-20 RX ADMIN — PHENYLEPHRINE HYDROCHLORIDE 100 MCG: 10 INJECTION INTRAVENOUS at 01:07

## 2020-07-20 RX ADMIN — PHENAZOPYRIDINE 100 MG: 100 TABLET ORAL at 05:07

## 2020-07-20 RX ADMIN — ACETAMINOPHEN 1000 MG: 10 INJECTION, SOLUTION INTRAVENOUS at 02:07

## 2020-07-20 RX ADMIN — TAMSULOSIN HYDROCHLORIDE 0.4 MG: 0.4 CAPSULE ORAL at 09:07

## 2020-07-20 RX ADMIN — CIPROFLOXACIN 400 MG: 2 INJECTION, SOLUTION INTRAVENOUS at 09:07

## 2020-07-20 RX ADMIN — SODIUM CHLORIDE, SODIUM LACTATE, POTASSIUM CHLORIDE, AND CALCIUM CHLORIDE: .6; .31; .03; .02 INJECTION, SOLUTION INTRAVENOUS at 12:07

## 2020-07-20 RX ADMIN — FENTANYL CITRATE 50 MCG: 50 INJECTION INTRAMUSCULAR; INTRAVENOUS at 01:07

## 2020-07-20 RX ADMIN — PROPOFOL 200 MG: 10 INJECTION, EMULSION INTRAVENOUS at 12:07

## 2020-07-20 RX ADMIN — PANTOPRAZOLE SODIUM 40 MG: 40 TABLET, DELAYED RELEASE ORAL at 09:07

## 2020-07-20 RX ADMIN — DEXAMETHASONE SODIUM PHOSPHATE 4 MG: 4 INJECTION, SOLUTION INTRAMUSCULAR; INTRAVENOUS at 12:07

## 2020-07-20 RX ADMIN — OXYBUTYNIN CHLORIDE 10 MG: 5 TABLET, EXTENDED RELEASE ORAL at 09:07

## 2020-07-20 RX ADMIN — MIDAZOLAM HYDROCHLORIDE 2 MG: 1 INJECTION, SOLUTION INTRAMUSCULAR; INTRAVENOUS at 12:07

## 2020-07-20 RX ADMIN — KETOROLAC TROMETHAMINE 15 MG: 30 INJECTION, SOLUTION INTRAMUSCULAR at 08:07

## 2020-07-20 NOTE — SUBJECTIVE & OBJECTIVE
Interval History: No acute events overnight.  Denies sob but complains of dry cough and associated upper abdominal discomfort.  No CP.    Review of Systems   Constitutional: Positive for activity change, appetite change, chills, fatigue and fever.   HENT: Negative for congestion and sneezing.    Respiratory: Negative for shortness of breath. Cough: intermittent productive /nonproductive cough.    Cardiovascular: Negative for chest pain, palpitations and leg swelling.   Gastrointestinal: Negative for abdominal distention and abdominal pain.        Right groin pain but not at present   Endocrine: Negative for cold intolerance and heat intolerance.   Genitourinary: Positive for flank pain and frequency. Negative for decreased urine volume and dysuria.   Musculoskeletal: Positive for arthralgias, back pain and myalgias. Negative for gait problem.   Skin: Negative.    Neurological: Negative for dizziness.   Hematological: Negative for adenopathy.   Psychiatric/Behavioral: Negative for agitation and behavioral problems.     Objective:     Vital Signs (Most Recent):  Temp: 97.7 °F (36.5 °C) (07/20/20 1334)  Pulse: 73 (07/20/20 1350)  Resp: 12 (07/20/20 1350)  BP: (!) 126/59 (07/20/20 1350)  SpO2: 98 % (07/20/20 1350) Vital Signs (24h Range):  Temp:  [97.7 °F (36.5 °C)-98.8 °F (37.1 °C)] 97.7 °F (36.5 °C)  Pulse:  [63-90] 73  Resp:  [12-20] 12  SpO2:  [93 %-100 %] 98 %  BP: (118-159)/(58-94) 126/59     Weight: 68.3 kg (150 lb 9.9 oz)  Body mass index is 24.31 kg/m².    Intake/Output Summary (Last 24 hours) at 7/20/2020 1354  Last data filed at 7/20/2020 1325  Gross per 24 hour   Intake 1608.33 ml   Output 1000 ml   Net 608.33 ml      Physical Exam  Constitutional:       General: She is not in acute distress.     Appearance: Normal appearance. She is not ill-appearing or toxic-appearing.   HENT:      Head: Normocephalic.      Nose: No congestion or rhinorrhea.   Eyes:      Extraocular Movements: Extraocular movements intact.       Pupils: Pupils are equal, round, and reactive to light.   Neck:      Musculoskeletal: Normal range of motion.   Cardiovascular:      Rate and Rhythm: Normal rate.   Pulmonary:      Effort: Pulmonary effort is normal.      Breath sounds: No wheezing.   Abdominal:      General: There is no distension.      Palpations: Abdomen is soft.      Tenderness: There is abdominal tenderness. There is left CVA tenderness.   Musculoskeletal: Normal range of motion.   Skin:     General: Skin is warm and dry.   Neurological:      General: No focal deficit present.      Mental Status: She is alert and oriented to person, place, and time. Mental status is at baseline.   Psychiatric:         Mood and Affect: Mood normal.         Significant Labs: All pertinent labs within the past 24 hours have been reviewed.    Significant Imaging: I have reviewed and interpreted all pertinent imaging results/findings within the past 24 hours.

## 2020-07-20 NOTE — H&P
[facility] Hospital Medicine  History & Physical    Patient Name: Myra Harrison  MRN: [id number]  Admission Date: 7/19/2020  Attending Physician: Xavier Woody MD   Primary Care Provider: Gilles Leyva MD         Patient information was obtained from patient and ER records.     Subjective:     Principal Problem:Bacteremia due to Escherichia coli    Chief Complaint:   Chief Complaint   Patient presents with    Abnormal Lab     Positive blood culture - E.Coli in 1 of 2 bottles        HPI: Mrs. Harrison is a 54 yo female with significant history for hypertension, asthma, uterus stone 05/01/2015, chronic fibromyalgia, chronic lower back pain, anxiety, acid reflux, and hx of renal stone 2015 who was told to return to the ED today for E coli bacteremia from blood cultures drawn on 07/16/2020 (3 days ago).  Patient presented to ED 3 days ago for fever 100.2 and productive and nonproductive cough since Thursday (4 days ago).  Urine culture was not obtained at that time.  Patient also reports right groin pain and left flank pain that have been ongoing since Thursday.  Took Tylenol with minimal relief.  Patient also reports ongoing fever since Thursday and 99.9 this morning which subsided with Tylenol.  Patient reports going out to dinner with a couple who did not wear mask 1.5 weeks ago.  Denies recent sick contact.  Patient lives with  who smokes.  Denies smoking, alcohol, or use of illicit drugs.  Patient reports on Xanax p.r.n. and fentanyl recently by Dr. Ramirez however I do not see on   Per urology note 05/01/2015, patient status post URS with attempted stone extraction however post procedure image showed stone in the left kidney at that time.   In ED, UA evidence of infection with 2+ occult blood.  Urine culture and blood culture collected.  Cipro started in ED secondary to allergy to penicillin.  CT renal showed Moderate hydronephrosis on the left secondary to a 7 x 8 mm stone  in the proximal left ureter.     Past Medical History:   Diagnosis Date    Asthma     Brain mass 2007    Depression     Fibromyalgia     Hypertension     Seizures 2007    fell and hit head at grocery          Past Surgical History:   Procedure Laterality Date    CARPAL TUNNEL RELEASE      right     SECTION      x 3    cysto/ureteroscopy stent placement      HYSTERECTOMY      TONSILLECTOMY         Review of patient's allergies indicates:   Allergen Reactions    Penicillins Itching         Family History     Problem Relation (Age of Onset)    Cancer Mother, Maternal Grandmother, Maternal Grandfather, Paternal Grandmother, Paternal Grandfather    Diabetes Father    Heart disease Mother        Tobacco Use    Smoking status: Never Smoker    Smokeless tobacco: Never Used   Substance and Sexual Activity    Alcohol use: Yes     Comment: occasionally    Drug use: No    Sexual activity: Yes     Review of Systems   Constitutional: Positive for activity change, appetite change, chills, fatigue and fever.   Respiratory: Negative for shortness of breath. Cough: intermittent productive /nonproductive cough.    Cardiovascular: Negative for chest pain, palpitations and leg swelling.   Gastrointestinal: Negative.         Right groin pain but not at present   Endocrine: Negative.    Genitourinary: Positive for flank pain and frequency. Negative for decreased urine volume and dysuria.   Musculoskeletal: Positive for arthralgias, back pain and myalgias. Negative for gait problem.   Skin: Negative.    Neurological: Negative.    Hematological: Negative.    Psychiatric/Behavioral: Negative for agitation and behavioral problems.     Objective:     Vital Signs (Most Recent):  Temp: 98.5 °F (36.9 °C) (20)  Pulse: 69 (20)  Resp: 18 (20)  BP: (!) 143/61 (20)  SpO2: 98 % (20) Vital Signs (24h Range):  Temp:  [98.5 °F (36.9 °C)-98.7 °F (37.1 °C)] 98.5 °F (36.9  °C)  Pulse:  [63-90] 69  Resp:  [18] 18  SpO2:  [97 %-98 %] 98 %  BP: (126-159)/(61-94) 143/61     Weight: 68 kg (150 lb)  Body mass index is 24.21 kg/m².    Physical Exam  Constitutional:       Appearance: Normal appearance.   HENT:      Head: Normocephalic.   Eyes:      Extraocular Movements: Extraocular movements intact.      Pupils: Pupils are equal, round, and reactive to light.   Neck:      Musculoskeletal: Normal range of motion.   Cardiovascular:      Rate and Rhythm: Normal rate.   Pulmonary:      Effort: Pulmonary effort is normal.      Breath sounds: No wheezing.   Abdominal:      General: There is no distension.      Palpations: Abdomen is soft.      Tenderness: There is no abdominal tenderness. There is left CVA tenderness.   Musculoskeletal: Normal range of motion.   Skin:     General: Skin is warm and dry.   Neurological:      General: No focal deficit present.      Mental Status: She is alert and oriented to person, place, and time. Mental status is at baseline.   Psychiatric:         Mood and Affect: Mood normal.           CRANIAL NERVES     CN III, IV, VI   Pupils are equal, round, and reactive to light.       Significant Labs: All pertinent labs within the past 24 hours have been reviewed.    Significant Imaging: I have reviewed and interpreted all pertinent imaging results/findings within the past 24 hours.    Assessment/Plan:     * Bacteremia due to Escherichia coli  -Mrs. Harrison is admitted to inpatient status  -Noted 2/4 bottles from blood cultures two days ago (7/16/20) in ER positive for E.coli. Urine culture not collected at that time.   -While not present two days ago and no symptoms of UTI presently, UA now suggestive of UTI.  Noted history of nephrolithiasis and with left flank pain. Non toxic appearing at this time.   -Noted lack of leukocytosis and not septic at this time.    -CT Abd/pelvis renal stone showed mod hydronephrosis on the left secondary to 7/8 mm stone in the proximal left  "ureter and mild perinephric stranding on the left  -Check echo, rapid hiv, procalcitonin  -Blood cultures and urine cultures obtained in ER  -Will treat with gentle IV fluids and cipro  -Start flomax ; not sure if 7x 8 mm will pass though  -Keep NPO p MN until seen by Urology.   -Consult ID and Urology in AM for recommendations.    Ureteral stone  -2015 left ureter stone. Per Urology , status post Uri's, attempted stone extraction and ureter stent placement however postop CT showed residual stone in kidney and non obstructive at that time.  -CT renal stone AP shows moderate hydronephrosis on the left secondary to a 7 x 8 mm stone in the proximal left ureter and mild perinephric stranding on the left.  -start flomax  -Keep NPO  -Discussed with Urology above. If develops fever or worsen in symptoms please call Dr. Brice back tonight-Discussed with our Nocturnist.       Possible COVID-19 Infection  -Has significant cough and apparent significant upper airway viral infection  -Rapid covid is negative and no leukocytosis, hypoxia or infiltration on CXR.  -Check routine screening covid, d-dimer, crp, ldh and ferritin  -Tessalon PRN cough  -Maintain covid isolation for now.    MDD (major depressive disorder)  -History noted  -xanax not on  and per Pain Management Dr. Holbrook-wean off. Will not start for now.   -Hold citalopram for now given treatment with cipro and interaction        Asthma  -History noted. Very controlled. Use approximately once every 3 months  -Provide albuterol MDI as needed      Seizure disorder  -History noted. Stated see [facility] Neurologist but I do not see in care everywhere. Reports "was passing out" and last episode 1 year ago.    -Not on anti-epileptic medications at home.   -Follow up with [facility] Neurologist outpatient      Essential hypertension  -History noted but no anti-hypertensive medications on home med list  -Was on a "fluid pill" but did not get refill   -BP normal at this " time  -Monitor and start norvasc if need      UTI (urinary tract infection)  -Treatment as above      Fibromyalgia  -History noted. Reports on fentanyl patch but I do not see on . Will not start at this time. Toradol PRN       VTE Risk Mitigation (From admission, onward)         Ordered     enoxaparin injection 40 mg  Every 24 hours      07/19/20 1843     IP VTE HIGH RISK PATIENT  Once      07/19/20 1843     Place sequential compression device  Until discontinued      07/19/20 1843                   Esperanza Caraballo NP  Department of Hospital Medicine   [facility]

## 2020-07-20 NOTE — HOSPITAL COURSE
55 year old woman with history of hypertension, asthma, fibromyalgia, gerd and ureterolithiasis who returned after being notified by ER of 1/2 blood cultures obtained 7/16 being positive for E.coli.  She was found to have a 8mm left ureteral stone and was taken by urology 7/20 for cystoscopy with placement of a left JJ stent.  She is being treated with cipro.  Repeat blood cultures 7/19 negative. Urine culture with EColi also. Discussed treatment of bacteremia with patient. Discussed plan for cipro x2 weeks. Discussed potential tendon side effects with patient, and she agrees to cipro. No evidence of infective endocarditis on echo.  ID agrees with plan of care. Prescribed fluconazole 150mg x1 PRN yeast infection as patient states that she frequently has yeast infections while on antibiotics. QTc checked and OK for cipro + citalopram (home med). Follow up with Urology.     Also noted to have significant cough and viral appearing URI for several weeks.  Rapid covid and COVID PCR negative. No hypoxia. Can follow up with PCP.     Discussed plan of care with patient and her .

## 2020-07-20 NOTE — SUBJECTIVE & OBJECTIVE
Past Medical History:   Diagnosis Date    Asthma     Brain mass 2007    Depression     Fibromyalgia     Hypertension     Seizures 2007    fell and hit head at grocery          Past Surgical History:   Procedure Laterality Date    CARPAL TUNNEL RELEASE      right     SECTION      x 3    cysto/ureteroscopy stent placement      HYSTERECTOMY      TONSILLECTOMY         Review of patient's allergies indicates:   Allergen Reactions    Penicillins Itching       Family History     Problem Relation (Age of Onset)    Cancer Mother, Maternal Grandmother, Maternal Grandfather, Paternal Grandmother, Paternal Grandfather    Diabetes Father    Heart disease Mother          Tobacco Use    Smoking status: Never Smoker    Smokeless tobacco: Never Used   Substance and Sexual Activity    Alcohol use: Yes     Comment: occasionally    Drug use: No    Sexual activity: Yes       Review of Systems   Constitutional: Positive for fever. Negative for appetite change and chills.   HENT: Negative for congestion, sore throat and trouble swallowing.    Eyes: Negative for pain and itching.   Respiratory: Negative for cough and shortness of breath.    Cardiovascular: Negative for chest pain, palpitations and leg swelling.   Gastrointestinal: Positive for abdominal pain. Negative for abdominal distention, constipation, diarrhea, nausea and vomiting.   Genitourinary: Positive for flank pain. Negative for difficulty urinating, dysuria, hematuria, nocturia and urgency.   Musculoskeletal: Positive for back pain. Negative for neck pain and neck stiffness.   Skin: Negative for rash and wound.   Neurological: Negative for dizziness and seizures.   Hematological: Negative for adenopathy. Does not bruise/bleed easily.   Psychiatric/Behavioral: Negative for confusion. The patient is not nervous/anxious.    All other systems reviewed and are negative.      Objective:     Temp:  [98 °F (36.7 °C)-98.8 °F (37.1 °C)] 98 °F (36.7 °C)  Pulse:   [63-90] 71  Resp:  [18-20] 20  SpO2:  [93 %-100 %] 95 %  BP: (124-159)/(61-94) 124/61     Body mass index is 24.31 kg/m².    Date 07/20/20 0700 - 07/21/20 0659   Shift 2307-3889 5153-6967 9289-1622 24 Hour Total   INTAKE   P.O. 0   0   Shift Total(mL/kg) 0(0)   0(0)   OUTPUT   Shift Total(mL/kg)       Weight (kg) 68.3 68.3 68.3 68.3          Drains     None                 Physical Exam   Vitals reviewed.  Constitutional: She is oriented to person, place, and time. She appears well-developed. No distress.   HENT:   Head: Normocephalic and atraumatic.   Neck: Normal range of motion.   Cardiovascular: Normal rate and regular rhythm.    Pulmonary/Chest: Effort normal and breath sounds normal. No respiratory distress.   Abdominal: Soft. She exhibits no distension and no mass. There is no abdominal tenderness. There is no rebound and no guarding.   Musculoskeletal: Normal range of motion.   Neurological: She is alert and oriented to person, place, and time.   Skin: Skin is warm and dry. No rash noted. She is not diaphoretic. No erythema.     Psychiatric: Her behavior is normal.       Significant Labs:    BMP:  Recent Labs   Lab 07/16/20 1839 07/19/20  1450 07/20/20  0635    141 142   K 3.7 4.1 3.5    107 108   CO2 23 25 27   BUN 10 11 11   CREATININE 0.9 0.9 0.9   CALCIUM 9.7 9.8 8.9       CBC:  Recent Labs   Lab 07/16/20 1839 07/19/20  1440 07/20/20  0635   WBC 9.23 6.77 6.62   HGB 14.1 13.8 12.0   HCT 41.9 42.4 36.7*    304 261       Blood Culture:   Recent Labs   Lab 07/16/20  1925 07/19/20  1440 07/19/20  1550   LABBLOO Gram stain lois bottle: Gram negative rods   Results called to and read back by: Aliyah Cisneros  07/17/2020  07:28  Gram stain aer bottle: Gram negative rods   Positive results previously called  ESCHERICHIA COLI* No Growth to date No Growth to date     Urine Culture: No results for input(s): LABURIN in the last 168 hours.  Urine Studies:   Recent Labs   Lab 07/16/20 2029  07/19/20  1449   COLORU Straw Yellow   APPEARANCEUA Clear Cloudy*   PHUR 6.0 5.0   SPECGRAV 1.005 1.010   PROTEINUA Negative 2+*   GLUCUA Negative Negative   KETONESU Negative Negative   BILIRUBINUA Negative Negative   OCCULTUA 1+* 2+*   NITRITE Negative Negative   UROBILINOGEN Negative Negative   LEUKOCYTESUR 2+* 3+*   RBCUA 2 10*   WBCUA 4 >100*   BACTERIA Rare Moderate*   SQUAMEPITHEL 2  --    HYALINECASTS  --  0       Significant Imaging:  All pertinent imaging results/findings from the past 24 hours have been reviewed.

## 2020-07-20 NOTE — SUBJECTIVE & OBJECTIVE
Past Medical History:   Diagnosis Date    Asthma     Brain mass 2007    Depression     Fibromyalgia     Hypertension     Seizures 2007    fell and hit head at grocery          Past Surgical History:   Procedure Laterality Date    CARPAL TUNNEL RELEASE      right     SECTION      x 3    cysto/ureteroscopy stent placement      HYSTERECTOMY      TONSILLECTOMY         Review of patient's allergies indicates:   Allergen Reactions    Penicillins Itching         Family History     Problem Relation (Age of Onset)    Cancer Mother, Maternal Grandmother, Maternal Grandfather, Paternal Grandmother, Paternal Grandfather    Diabetes Father    Heart disease Mother        Tobacco Use    Smoking status: Never Smoker    Smokeless tobacco: Never Used   Substance and Sexual Activity    Alcohol use: Yes     Comment: occasionally    Drug use: No    Sexual activity: Yes     Review of Systems   Constitutional: Positive for activity change, appetite change, chills, fatigue and fever.   Respiratory: Negative for shortness of breath. Cough: intermittent productive /nonproductive cough.    Cardiovascular: Negative for chest pain, palpitations and leg swelling.   Gastrointestinal: Negative.         Right groin pain but not at present   Endocrine: Negative.    Genitourinary: Positive for flank pain and frequency. Negative for decreased urine volume and dysuria.   Musculoskeletal: Positive for arthralgias, back pain and myalgias. Negative for gait problem.   Skin: Negative.    Neurological: Negative.    Hematological: Negative.    Psychiatric/Behavioral: Negative for agitation and behavioral problems.     Objective:     Vital Signs (Most Recent):  Temp: 98.5 °F (36.9 °C) (20)  Pulse: 69 (20)  Resp: 18 (20)  BP: (!) 143/61 (20)  SpO2: 98 % (20) Vital Signs (24h Range):  Temp:  [98.5 °F (36.9 °C)-98.7 °F (37.1 °C)] 98.5 °F (36.9 °C)  Pulse:  [63-90] 69  Resp:  [18]  18  SpO2:  [97 %-98 %] 98 %  BP: (126-159)/(61-94) 143/61     Weight: 68 kg (150 lb)  Body mass index is 24.21 kg/m².    Physical Exam  Constitutional:       Appearance: Normal appearance.   HENT:      Head: Normocephalic.   Eyes:      Extraocular Movements: Extraocular movements intact.      Pupils: Pupils are equal, round, and reactive to light.   Neck:      Musculoskeletal: Normal range of motion.   Cardiovascular:      Rate and Rhythm: Normal rate.   Pulmonary:      Effort: Pulmonary effort is normal.      Breath sounds: No wheezing.   Abdominal:      General: There is no distension.      Palpations: Abdomen is soft.      Tenderness: There is no abdominal tenderness. There is left CVA tenderness.   Musculoskeletal: Normal range of motion.   Skin:     General: Skin is warm and dry.   Neurological:      General: No focal deficit present.      Mental Status: She is alert and oriented to person, place, and time. Mental status is at baseline.   Psychiatric:         Mood and Affect: Mood normal.           CRANIAL NERVES     CN III, IV, VI   Pupils are equal, round, and reactive to light.       Significant Labs: All pertinent labs within the past 24 hours have been reviewed.    Significant Imaging: I have reviewed and interpreted all pertinent imaging results/findings within the past 24 hours.

## 2020-07-20 NOTE — OP NOTE
[facility]    Operative Note    Date: 07/20/2020    Pre-Op Diagnosis: Left ureteral calculus, bacteremia    Post-Op Diagnosis: same    Procedure(s) Performed:   1.  Cystoscopy with left JJ stent placement  2.  Fluoro < 1 h  3. Retrograde pyelogram    Specimen(s): none     Surgeon: Yessi Granados    Anesthesia: Monitored Local Anesthesia with Sedation    Indications: yMra Harrison is a 55 y.o. female with left 8mm proximal ureteral stone with bacteremia.    Findings:   1.  Radio-opaque 8mm L proximal ureteral stone    Estimated Blood Loss: minimal    Drains:   1.  6Fr x 24cm left JJ ureteral stent without strings    Procedure in Detail:  After risks, benefits and possible complications of the procedure were explained, the patient elected to undergo the procedure and informed consent was obtained. All questions were answered in the yang-operative area. The patient was transferred to the cystoscopy suite and placed on the fluoroscopy table in the supine position.  SCDs were applied and working. Time out was performed, yang-procedural antibiotics were given. MAC anesthesia was administered.  COVID airborne precautions were maintained.  After adequate anesthesia the patient was placed in dorsal lithotomy position and prepped and draped in the usual sterile fashion.     A rigid cystoscope in a 22 Fr sheath was introduced into the patients bladder per urethra. This passed easily.  The entire urethra was visualized and revealed no strictures or masses.  Formal cystoscopy was performed which showed the right and left ureteral orifices in the normal anatomic position.  An area of cystitis cystica was noted around the right ureteral orifice.  Left ureteral orifice was noted to be normal.  There were no bladder tumors, no  trabeculations, and no stones.       film was obtained which showed a radiopaque stone expected location of the left proximal ureter consistent with preoperative imaging.    Our  attention was turned to the patients left ureteral orifice.  A Sensor wire was placed into the left ureteral orifice up to the level of the radiopaque stone.  Noted be resistance likely due to impacted stone.  Some manipulation was performed in attempts to bypass the stone with the Sensor wire which was unsuccessful.  For this reason a 5F soft tip ureteral cath  was advanced up over the Sensor wire to the level of the stone.  Contrast was instilled to opacify the ureter and renal pelvis.  Contrast was noted to go proximal to the stone.  The angled Glidewire was used to bypass the stone with the assistance of the 5 Haitian open-ended ureteral catheter.  The wire was passed up to level of the kidney as confirmed on fluoroscopy.  Five Haitian open-ended catheter was then passed to the level of the renal pelvis and the wire was exchanged for the Sensor wire. This was confirmed using fluoroscopy.    6Fr x 24cm JJ stent was advanced over the wire to the level of the renal pelvis and wire was removed. Slightly cloudy efflux urine was noted.  A 180 degree coil was observed in the renal pelvis as well as the bladder using fluoro.  A 180 degree coil was also seen using direct visualization in the bladder.      The patient tolerated the procedure well and was transferred to the recovery room in stable condition.      She will follow up as outpatient to arrange for definitive treatment of stone when infection cleared.     Yessi Granados MD

## 2020-07-20 NOTE — ASSESSMENT & PLAN NOTE
- 8mm L proximal ureteral stone   - Plan for stent placement today for decompression. Stone not able to be treated today due to infection.    - Will need delayed stone treatment as outpatient.

## 2020-07-20 NOTE — ANESTHESIA PREPROCEDURE EVALUATION
07/20/2020  Myra Harrison is a 55 y.o., female.    Anesthesia Evaluation     I have reviewed the Nursing Notes.       Review of Systems  Anesthesia Hx:  No problems with previous Anesthesia   Social:  Non-Smoker    Cardiovascular:   Denies Pacemaker. Hypertension  Denies Valvular problems/Murmurs.  Denies MI.  Denies CAD.    Denies CABG/stent.  Denies Dysrhythmias.   Denies Angina.             denies PVD no hyperlipidemia    Pulmonary:   Denies Pneumonia Asthma    Renal/:   renal calculi    Hepatic/GI:   No Bowel Prep. GERD Denies Liver Disease. Denies Hepatitis.    Neurological:   Headaches Seizures   Chronic Pain Syndrome   Endocrine:  Endocrine Normal    Psych:   Psychiatric History          Physical Exam  General:  Well nourished    Airway/Jaw/Neck:  AIRWAY FINDINGS: Normal      Chest/Lungs:  Chest/Lungs Clear    Heart/Vascular:  Heart Findings: Normal       Mental Status:  Mental Status Findings: Normal        Anesthesia Plan  Type of Anesthesia, risks & benefits discussed:  Anesthesia Type:  general  Patient's Preference:   Intra-op Monitoring Plan: standard ASA monitors  Intra-op Monitoring Plan Comments:   Post Op Pain Control Plan:   Post Op Pain Control Plan Comments:   Induction:   IV  Beta Blocker:  Patient is not currently on a Beta-Blocker (No further documentation required).       Informed Consent: Patient understands risks and agrees with Anesthesia plan.  Questions answered. Anesthesia consent signed with patient.  ASA Score: 2     Day of Surgery Review of History & Physical:  There are no significant changes.  H&P update referred to the provider.         Ready For Surgery From Anesthesia Perspective.

## 2020-07-20 NOTE — PLAN OF CARE
Problem: Fall Injury Risk  Goal: Absence of Fall and Fall-Related Injury  Outcome: Ongoing, Progressing     Problem: Adult Inpatient Plan of Care  Goal: Plan of Care Review  Outcome: Ongoing, Progressing     Problem: Adult Inpatient Plan of Care  Goal: Optimal Comfort and Wellbeing  Outcome: Ongoing, Progressing   Pt aaox4, able to walk to bathroom with standby assist. No c/o pain. Free of falls or injuries. Iv fluids infusing.

## 2020-07-20 NOTE — HPI
Mrs. Harrison is a 56 yo female with significant history for hypertension, asthma, uterus stone 05/01/2015, chronic fibromyalgia, chronic lower back pain, anxiety, acid reflux, and hx of renal stone 2015 who was told to return to the ED today for E coli bacteremia from blood cultures drawn on 07/16/2020 (3 days ago).  Patient presented to ED 3 days ago for fever 100.2 and productive and nonproductive cough since Thursday (4 days ago).  Urine culture was not obtained at that time.  Patient also reports right groin pain and left flank pain that have been ongoing since Thursday.  Took Tylenol with minimal relief.  Patient also reports ongoing fever since Thursday and 99.9 this morning which subsided with Tylenol.  Patient reports going out to dinner with a couple who did not wear mask 1.5 weeks ago.  Denies recent sick contact.  Patient lives with  who smokes.  Denies smoking, alcohol, or use of illicit drugs.  Patient reports on Xanax p.r.n. and fentanyl recently by Dr. Ramirez however I do not see on   Per urology note 05/01/2015, patient status post URS with attempted stone extraction however post procedure image showed stone in the left kidney at that time.   In ED, UA evidence of infection with 2+ occult blood.  Urine culture and blood culture collected.  Cipro started in ED secondary to allergy to penicillin.  CT renal showed Moderate hydronephrosis on the left secondary to a 7 x 8 mm stone in the proximal left ureter.

## 2020-07-20 NOTE — NURSING
Pt back from procedure aaox4. She was able to walk to bathroom to urinate. No signs of distress noted

## 2020-07-20 NOTE — TELEPHONE ENCOUNTER
----- Message from Nyasia Nelson sent at 7/20/2020  8:22 AM CDT -----  Type: HOSP CONSULT    Who called: Pam _ [facility]     What is the request in detail: RE: E.coli  Bacterium , Left stone with E.coli Bacterium   Rm. 430 Bed B   Nurse : Radha   #[phone]    Can the clinic reply by [facility]? No     Would the patient rather a call back or a response via [facility]? Call

## 2020-07-20 NOTE — CONSULTS
[facility]  Infectious Disease  Consult Note    Patient Name: Myra Harrison  MRN: [id number]  Admission Date: 7/19/2020  Hospital Length of Stay: 1 days  Attending Physician: Xavier Woody MD  Primary Care Provider: Gilles Leyva MD     Isolation Status: Airborne and Contact and Droplet    Patient information was obtained from patient, past medical records and ER records.      Inpatient consult to Infectious Diseases  Consult performed by: Louann Todd MD  Consult ordered by: Yessi Granados MD        Assessment/Plan:     * Bacteremia due to Escherichia coli  E coli bacteremia secondary to UTI in the setting of left ureteral stone. Afebrile and without leukocytosis. Repeat blood cultures on 7/19 remain NGTD.   · Agree with Cipro.   · Can transition to oral formulation if able to take oral intake.   · Will follow up pending cultures.   · Anticipate a 14 day course if ureteral stent is placed.    E. coli UTI  See above      Ureteral stone  See above        Thank you for your consult. I will follow-up with patient. Please contact us if you have any additional questions.    Louann Todd MD  Infectious Disease  [facility]    Subjective:     Principal Problem: Bacteremia due to Escherichia coli    HPI: A 55-year-old woman with HTN, chronic fibromyalgia, chronic back pain, and past nephrolithiasis whom 3 weeks prior to admission developed a cough productive of clear yellow phlegm. She was evaluated in [facility] ED after she developed SOB, nausea, vomiting, chills, and ongoing myalgias. She was evaluated and discharged after initial work up was unrevealing. She was called back for admission after blood cultures became positive for E coli. On repeat examination urinalysis showed evidence of pyuria. Urine culture now with presumptive E coli. She was admitted and started on Cipro. CT showed evidence of a left ureteral stone.     Mrs. Harrison has hives  allergy to penicillin. She has a pet dog. She consumes alcohol socially. No recent travel.     Infectious Diseases consulted for antibiotic recommendations.           Past Medical History:   Diagnosis Date    Asthma     Brain mass 2007    Depression     Fibromyalgia     Hypertension     Seizures 2007    fell and hit head at grocery          Past Surgical History:   Procedure Laterality Date    CARPAL TUNNEL RELEASE      right     SECTION      x 3    cysto/ureteroscopy stent placement      HYSTERECTOMY      TONSILLECTOMY         Review of patient's allergies indicates:   Allergen Reactions    Penicillins Itching       Medications:  Medications Prior to Admission   Medication Sig    albuterol (PROVENTIL/VENTOLIN HFA) 90 mcg/actuation inhaler Inhale 1-2 puffs into the lungs every 4 (four) hours as needed for Shortness of Breath. Rescue    alprazolam (XANAX) 1 MG tablet Take 1 mg by mouth 2 (two) times daily.    citalopram (CELEXA) 10 MG tablet Take 10 mg by mouth once daily.    ondansetron (ZOFRAN-ODT) 4 MG TbDL Take 1 tablet (4 mg total) by mouth every 6 (six) hours as needed (Nausea).    pantoprazole (PROTONIX) 20 MG tablet Take 1 tablet (20 mg total) by mouth once daily.    promethazine (PHENERGAN) 12.5 MG Tab Take 1 tablet (12.5 mg total) by mouth 4 (four) times daily.    promethazine-dextromethorphan (PROMETHAZINE-DM) 6.25-15 mg/5 mL Syrp Take 5 mLs by mouth nightly as needed (cough).    oxybutynin (DITROPAN-XL) 10 MG 24 hr tablet Take 1 tablet (10 mg total) by mouth once daily.    promethazine (PHENERGAN) 25 MG tablet Take 1 tablet (25 mg total) by mouth every 6 (six) hours as needed for Nausea. May cause drowsiness    UNABLE TO FIND Apply 1 patch topically Every 3 (three) days. medication name:      Antibiotics (From admission, onward)    Start     Stop Route Frequency Ordered    20 193  ciprofloxacin (CIPRO)400mg/200ml D5W IVPB 400 mg      -- IV Every 12 hours (non-standard  times) 07/19/20 1843        Antifungals (From admission, onward)    None        Antivirals (From admission, onward)    None           Immunization History   Administered Date(s) Administered    Tdap 03/31/2014       Family History     Problem Relation (Age of Onset)    Cancer Mother, Maternal Grandmother, Maternal Grandfather, Paternal Grandmother, Paternal Grandfather    Diabetes Father    Heart disease Mother        Social History     Socioeconomic History    Marital status:      Spouse name: Not on file    Number of children: Not on file    Years of education: Not on file    Highest education level: Not on file   Occupational History    Not on file   Social Needs    Financial resource strain: Not on file    Food insecurity     Worry: Not on file     Inability: Not on file    Transportation needs     Medical: Not on file     Non-medical: Not on file   Tobacco Use    Smoking status: Never Smoker    Smokeless tobacco: Never Used   Substance and Sexual Activity    Alcohol use: Yes     Comment: occasionally    Drug use: No    Sexual activity: Yes   Lifestyle    Physical activity     Days per week: Not on file     Minutes per session: Not on file    Stress: Not on file   Relationships    Social connections     Talks on phone: Not on file     Gets together: Not on file     Attends Mu-ism service: Not on file     Active member of club or organization: Not on file     Attends meetings of clubs or organizations: Not on file     Relationship status: Not on file   Other Topics Concern    Not on file   Social History Narrative    Not on file     Review of Systems   Constitutional: Negative for chills, fatigue, fever and unexpected weight change.   HENT: Negative for ear pain, facial swelling, hearing loss, mouth sores, nosebleeds, rhinorrhea, sinus pressure, sore throat, tinnitus, trouble swallowing and voice change.    Eyes: Negative for photophobia, pain, redness and visual disturbance.    Respiratory: Negative for cough, chest tightness, shortness of breath and wheezing.    Cardiovascular: Negative for chest pain, palpitations and leg swelling.   Gastrointestinal: Negative for abdominal pain, blood in stool, constipation, diarrhea, nausea and vomiting.   Endocrine: Negative for cold intolerance, heat intolerance, polydipsia, polyphagia and polyuria.   Genitourinary: Negative for decreased urine volume, dysuria, flank pain, frequency, hematuria, menstrual problem, urgency, vaginal bleeding, vaginal discharge and vaginal pain.   Musculoskeletal: Negative for arthralgias, back pain, joint swelling, myalgias and neck pain.   Skin: Negative for rash.   Allergic/Immunologic: Negative for environmental allergies, food allergies and immunocompromised state.   Neurological: Negative for dizziness, seizures, syncope, weakness, light-headedness, numbness and headaches.   Hematological: Negative for adenopathy. Does not bruise/bleed easily.   Psychiatric/Behavioral: Negative for confusion, hallucinations, self-injury, sleep disturbance and suicidal ideas. The patient is nervous/anxious.      Objective:     Vital Signs (Most Recent):  Temp: 97.8 °F (36.6 °C) (07/20/20 1615)  Pulse: 60 (07/20/20 1615)  Resp: 18 (07/20/20 1615)  BP: (!) 140/68 (07/20/20 1615)  SpO2: (!) 94 % (07/20/20 1615) Vital Signs (24h Range):  Temp:  [97.7 °F (36.5 °C)-98.8 °F (37.1 °C)] 97.8 °F (36.6 °C)  Pulse:  [60-86] 60  Resp:  [12-20] 18  SpO2:  [93 %-100 %] 94 %  BP: (118-153)/(58-82) 140/68     Weight: 68.3 kg (150 lb 9.9 oz)  Body mass index is 24.31 kg/m².    Estimated Creatinine Clearance: 66.9 mL/min (based on SCr of 0.9 mg/dL).    Physical Exam  Vitals signs and nursing note reviewed.   Constitutional:       General: She is not in acute distress.     Appearance: Normal appearance. She is well-developed. She is not toxic-appearing or diaphoretic.   HENT:      Head: Normocephalic and atraumatic. No right periorbital erythema or  left periorbital erythema.      Right Ear: Hearing and external ear normal. No swelling.      Left Ear: Hearing and external ear normal. No swelling.      Nose: Nose normal. No nasal deformity.      Mouth/Throat:      Mouth: Mucous membranes are moist.      Pharynx: Uvula midline.   Eyes:      General: Lids are normal. No scleral icterus.        Right eye: No discharge.         Left eye: No discharge.      Conjunctiva/sclera:      Right eye: Right conjunctiva is not injected. No exudate.     Left eye: Left conjunctiva is not injected. No exudate.  Neck:      Musculoskeletal: Normal range of motion and neck supple.      Thyroid: No thyromegaly.      Trachea: No tracheal deviation.   Cardiovascular:      Rate and Rhythm: Normal rate and regular rhythm.      Heart sounds: Normal heart sounds, S1 normal and S2 normal. No murmur. No friction rub. No gallop.    Pulmonary:      Effort: Pulmonary effort is normal. No tachypnea, accessory muscle usage or respiratory distress.      Breath sounds: Normal breath sounds. No stridor. No wheezing or rales.   Chest:      Chest wall: No tenderness.   Abdominal:      General: Bowel sounds are normal. There is no distension.      Palpations: Abdomen is soft.      Tenderness: There is no abdominal tenderness. There is no guarding or rebound.   Musculoskeletal: Normal range of motion.         General: No tenderness.      Right lower leg: Edema present.   Skin:     General: Skin is warm and dry.      Coloration: Skin is not pale.      Findings: No erythema, lesion or rash.      Nails: There is no clubbing.     Neurological:      Mental Status: She is alert, oriented to person, place, and time and easily aroused.      Cranial Nerves: No cranial nerve deficit.      Coordination: Coordination normal.   Psychiatric:         Speech: Speech normal.         Behavior: Behavior normal. Behavior is cooperative.         Thought Content: Thought content normal.         Judgment: Judgment normal.          Significant Labs:   Blood Culture:   Recent Labs   Lab 07/16/20  1839 07/16/20  1925 07/19/20  1440 07/19/20  1550   LABBLOO No Growth to date  No Growth to date  No Growth to date  No Growth to date Gram stain lois bottle: Gram negative rods   Results called to and read back by: Aliyah Cisneros  07/17/2020  07:28  Gram stain aer bottle: Gram negative rods   Positive results previously called  ESCHERICHIA COLI* No Growth to date No Growth to date     BMP:   Recent Labs   Lab 07/20/20  0635   GLU 87      K 3.5      CO2 27   BUN 11   CREATININE 0.9   CALCIUM 8.9   MG 2.0     CBC:   Recent Labs   Lab 07/19/20  1440 07/20/20  0635   WBC 6.77 6.62   HGB 13.8 12.0   HCT 42.4 36.7*    261     Urine Culture:   Recent Labs   Lab 07/19/20  1449   LABURIN PRESUMPTIVE E COLI  >100,000 cfu/ml  Identification and susceptibility pending  *     Urine Studies:   Recent Labs   Lab 07/16/20 2029 07/19/20  1449   COLORU Straw Yellow   APPEARANCEUA Clear Cloudy*   PHUR 6.0 5.0   SPECGRAV 1.005 1.010   PROTEINUA Negative 2+*   GLUCUA Negative Negative   KETONESU Negative Negative   BILIRUBINUA Negative Negative   OCCULTUA 1+* 2+*   NITRITE Negative Negative   UROBILINOGEN Negative Negative   LEUKOCYTESUR 2+* 3+*   RBCUA 2 10*   WBCUA 4 >100*   BACTERIA Rare Moderate*   SQUAMEPITHEL 2  --    HYALINECASTS  --  0       Significant Imaging: I have reviewed all pertinent imaging results/findings within the past 24 hours.

## 2020-07-20 NOTE — ASSESSMENT & PLAN NOTE
-History noted.   -Reports on fentanyl patch but I do not see on . Will not start at this time.   -Toradol PRN

## 2020-07-20 NOTE — ASSESSMENT & PLAN NOTE
-Mrs. Harrison was admitted to inpatient status  -Noted 2/4 bottles from blood cultures two days ago (7/16/20) in ER positive for E.coli. Urine culture not collected at that time.   -While not present two days ago and no symptoms of UTI presently, UA now suggestive of UTI.  Noted history of nephrolithiasis and with left flank pain. Non toxic appearing at this time.   -Noted lack of leukocytosis and not septic at this time.    -CT Abd/pelvis renal stone showed mod hydronephrosis on the left secondary to 7/8 mm stone in the proximal left ureter and mild perinephric stranding on the left  -Rapid HIV negative.  PCT minimally elevated.  Echo was unremarkable with no signs of infective endocarditis.    -Urine culture growing E.coli.  Blood cultures NGTD so far.  -Urology consulted and took patient today for cystoscopy with left JJ stent placement  -Continue IV fluids, cipro and flomax  -Await ID recommendations.

## 2020-07-20 NOTE — SUBJECTIVE & OBJECTIVE
Past Medical History:   Diagnosis Date    Asthma     Brain mass 2007    Depression     Fibromyalgia     Hypertension     Seizures 2007    fell and hit head at grocery          Past Surgical History:   Procedure Laterality Date    CARPAL TUNNEL RELEASE      right     SECTION      x 3    cysto/ureteroscopy stent placement      HYSTERECTOMY      TONSILLECTOMY         Review of patient's allergies indicates:   Allergen Reactions    Penicillins Itching       Medications:  Medications Prior to Admission   Medication Sig    albuterol (PROVENTIL/VENTOLIN HFA) 90 mcg/actuation inhaler Inhale 1-2 puffs into the lungs every 4 (four) hours as needed for Shortness of Breath. Rescue    alprazolam (XANAX) 1 MG tablet Take 1 mg by mouth 2 (two) times daily.    citalopram (CELEXA) 10 MG tablet Take 10 mg by mouth once daily.    ondansetron (ZOFRAN-ODT) 4 MG TbDL Take 1 tablet (4 mg total) by mouth every 6 (six) hours as needed (Nausea).    pantoprazole (PROTONIX) 20 MG tablet Take 1 tablet (20 mg total) by mouth once daily.    promethazine (PHENERGAN) 12.5 MG Tab Take 1 tablet (12.5 mg total) by mouth 4 (four) times daily.    promethazine-dextromethorphan (PROMETHAZINE-DM) 6.25-15 mg/5 mL Syrp Take 5 mLs by mouth nightly as needed (cough).    oxybutynin (DITROPAN-XL) 10 MG 24 hr tablet Take 1 tablet (10 mg total) by mouth once daily.    promethazine (PHENERGAN) 25 MG tablet Take 1 tablet (25 mg total) by mouth every 6 (six) hours as needed for Nausea. May cause drowsiness    UNABLE TO FIND Apply 1 patch topically Every 3 (three) days. medication name:      Antibiotics (From admission, onward)    Start     Stop Route Frequency Ordered    20 1930  ciprofloxacin (CIPRO)400mg/200ml D5W IVPB 400 mg      -- IV Every 12 hours (non-standard times) 20 1843        Antifungals (From admission, onward)    None        Antivirals (From admission, onward)    None           Immunization History    Administered Date(s) Administered    Tdap 03/31/2014       Family History     Problem Relation (Age of Onset)    Cancer Mother, Maternal Grandmother, Maternal Grandfather, Paternal Grandmother, Paternal Grandfather    Diabetes Father    Heart disease Mother        Social History     Socioeconomic History    Marital status:      Spouse name: Not on file    Number of children: Not on file    Years of education: Not on file    Highest education level: Not on file   Occupational History    Not on file   Social Needs    Financial resource strain: Not on file    Food insecurity     Worry: Not on file     Inability: Not on file    Transportation needs     Medical: Not on file     Non-medical: Not on file   Tobacco Use    Smoking status: Never Smoker    Smokeless tobacco: Never Used   Substance and Sexual Activity    Alcohol use: Yes     Comment: occasionally    Drug use: No    Sexual activity: Yes   Lifestyle    Physical activity     Days per week: Not on file     Minutes per session: Not on file    Stress: Not on file   Relationships    Social connections     Talks on phone: Not on file     Gets together: Not on file     Attends [employer] service: Not on file     Active member of club or organization: Not on file     Attends meetings of clubs or organizations: Not on file     Relationship status: Not on file   Other Topics Concern    Not on file   Social History Narrative    Not on file     Review of Systems   Constitutional: Negative for chills, fatigue, fever and unexpected weight change.   HENT: Negative for ear pain, facial swelling, hearing loss, mouth sores, nosebleeds, rhinorrhea, sinus pressure, sore throat, tinnitus, trouble swallowing and voice change.    Eyes: Negative for photophobia, pain, redness and visual disturbance.   Respiratory: Negative for cough, chest tightness, shortness of breath and wheezing.    Cardiovascular: Negative for chest pain, palpitations and leg swelling.    Gastrointestinal: Negative for abdominal pain, blood in stool, constipation, diarrhea, nausea and vomiting.   Endocrine: Negative for cold intolerance, heat intolerance, polydipsia, polyphagia and polyuria.   Genitourinary: Negative for decreased urine volume, dysuria, flank pain, frequency, hematuria, menstrual problem, urgency, vaginal bleeding, vaginal discharge and vaginal pain.   Musculoskeletal: Negative for arthralgias, back pain, joint swelling, myalgias and neck pain.   Skin: Negative for rash.   Allergic/Immunologic: Negative for environmental allergies, food allergies and immunocompromised state.   Neurological: Negative for dizziness, seizures, syncope, weakness, light-headedness, numbness and headaches.   Hematological: Negative for adenopathy. Does not bruise/bleed easily.   Psychiatric/Behavioral: Negative for confusion, hallucinations, self-injury, sleep disturbance and suicidal ideas. The patient is nervous/anxious.      Objective:     Vital Signs (Most Recent):  Temp: 97.8 °F (36.6 °C) (07/20/20 1615)  Pulse: 60 (07/20/20 1615)  Resp: 18 (07/20/20 1615)  BP: (!) 140/68 (07/20/20 1615)  SpO2: (!) 94 % (07/20/20 1615) Vital Signs (24h Range):  Temp:  [97.7 °F (36.5 °C)-98.8 °F (37.1 °C)] 97.8 °F (36.6 °C)  Pulse:  [60-86] 60  Resp:  [12-20] 18  SpO2:  [93 %-100 %] 94 %  BP: (118-153)/(58-82) 140/68     Weight: 68.3 kg (150 lb 9.9 oz)  Body mass index is 24.31 kg/m².    Estimated Creatinine Clearance: 66.9 mL/min (based on SCr of 0.9 mg/dL).    Physical Exam  Vitals signs and nursing note reviewed.   Constitutional:       General: She is not in acute distress.     Appearance: Normal appearance. She is well-developed. She is not toxic-appearing or diaphoretic.   HENT:      Head: Normocephalic and atraumatic. No right periorbital erythema or left periorbital erythema.      Right Ear: Hearing and external ear normal. No swelling.      Left Ear: Hearing and external ear normal. No swelling.      Nose:  Nose normal. No nasal deformity.      Mouth/Throat:      Mouth: Mucous membranes are moist.      Pharynx: Uvula midline.   Eyes:      General: Lids are normal. No scleral icterus.        Right eye: No discharge.         Left eye: No discharge.      Conjunctiva/sclera:      Right eye: Right conjunctiva is not injected. No exudate.     Left eye: Left conjunctiva is not injected. No exudate.  Neck:      Musculoskeletal: Normal range of motion and neck supple.      Thyroid: No thyromegaly.      Trachea: No tracheal deviation.   Cardiovascular:      Rate and Rhythm: Normal rate and regular rhythm.      Heart sounds: Normal heart sounds, S1 normal and S2 normal. No murmur. No friction rub. No gallop.    Pulmonary:      Effort: Pulmonary effort is normal. No tachypnea, accessory muscle usage or respiratory distress.      Breath sounds: Normal breath sounds. No stridor. No wheezing or rales.   Chest:      Chest wall: No tenderness.   Abdominal:      General: Bowel sounds are normal. There is no distension.      Palpations: Abdomen is soft.      Tenderness: There is no abdominal tenderness. There is no guarding or rebound.   Musculoskeletal: Normal range of motion.         General: No tenderness.      Right lower leg: Edema present.   Skin:     General: Skin is warm and dry.      Coloration: Skin is not pale.      Findings: No erythema, lesion or rash.      Nails: There is no clubbing.     Neurological:      Mental Status: She is alert, oriented to person, place, and time and easily aroused.      Cranial Nerves: No cranial nerve deficit.      Coordination: Coordination normal.   Psychiatric:         Speech: Speech normal.         Behavior: Behavior normal. Behavior is cooperative.         Thought Content: Thought content normal.         Judgment: Judgment normal.         Significant Labs:   Blood Culture:   Recent Labs   Lab 07/16/20  1839 07/16/20  1925 07/19/20  1440 07/19/20  1550   LABBLOO No Growth to date  No Growth  to date  No Growth to date  No Growth to date Gram stain lois bottle: Gram negative rods   Results called to and read back by: Aliyah Mihcel-TASIA  07/17/2020  07:28  Gram stain aer bottle: Gram negative rods   Positive results previously called  ESCHERICHIA COLI* No Growth to date No Growth to date     BMP:   Recent Labs   Lab 07/20/20  0635   GLU 87      K 3.5      CO2 27   BUN 11   CREATININE 0.9   CALCIUM 8.9   MG 2.0     CBC:   Recent Labs   Lab 07/19/20  1440 07/20/20  0635   WBC 6.77 6.62   HGB 13.8 12.0   HCT 42.4 36.7*    261     Urine Culture:   Recent Labs   Lab 07/19/20  1449   LABURIN PRESUMPTIVE E COLI  >100,000 cfu/ml  Identification and susceptibility pending  *     Urine Studies:   Recent Labs   Lab 07/16/20 2029 07/19/20  1449   COLORU Straw Yellow   APPEARANCEUA Clear Cloudy*   PHUR 6.0 5.0   SPECGRAV 1.005 1.010   PROTEINUA Negative 2+*   GLUCUA Negative Negative   KETONESU Negative Negative   BILIRUBINUA Negative Negative   OCCULTUA 1+* 2+*   NITRITE Negative Negative   UROBILINOGEN Negative Negative   LEUKOCYTESUR 2+* 3+*   RBCUA 2 10*   WBCUA 4 >100*   BACTERIA Rare Moderate*   SQUAMEPITHEL 2  --    HYALINECASTS  --  0       Significant Imaging: I have reviewed all pertinent imaging results/findings within the past 24 hours.

## 2020-07-20 NOTE — ASSESSMENT & PLAN NOTE
"-History noted. Stated see [facility] Neurologist but I do not see in care everywhere. Reports "was passing out" and last episode 1 year ago.    -Not on anti-epileptic medications at home.   -Follow up with [facility] Neurologist outpatient    "

## 2020-07-20 NOTE — HPI
A 55-year-old woman with HTN, chronic fibromyalgia, chronic back pain, and past nephrolithiasis whom 3 weeks prior to admission developed a cough productive of clear yellow phlegm. She was evaluated in [facility] ED after she developed SOB, nausea, vomiting, chills, and ongoing myalgias. She was evaluated and discharged after initial work up was unrevealing. She was called back for admission after blood cultures became positive for E coli. On repeat examination urinalysis showed evidence of pyuria. Urine culture now with presumptive E coli. She was admitted and started on Cipro. CT showed evidence of a left ureteral stone.     Mrs. Harrison has hives allergy to penicillin. She has a pet dog. She consumes alcohol socially. No recent travel.     Infectious Diseases consulted for antibiotic recommendations.

## 2020-07-20 NOTE — PLAN OF CARE
Recovery took place in OR with all isolation precautions maintained.   Volodymyr 9/10.  AAOx4. No n/v present at this time.   VSS per flow sheet. Denies pain. Voided per bedpan 100 ml cloudy yellow urine.   Patient belongings sent with patient back to room.   See chart for full assessment. Hand off report to MELISSA [facility].

## 2020-07-20 NOTE — ASSESSMENT & PLAN NOTE
-History noted  -xanax not on  and per Pain Management Dr. Holbrook-weaned off. Will not start for now.   -Hold citalopram for now given treatment with cipro and interaction

## 2020-07-20 NOTE — BRIEF OP NOTE
[facility]  Brief Operative Note    SUMMARY     Surgery Date: 7/20/2020     Surgeon(s) and Role:     * Yessi Granados MD - Primary    Assisting Surgeon: None    Pre-op Diagnosis:  Left ureteral stone, bacteremia    Post-op Diagnosis:  Post-Op Diagnosis Codes:     * Left ureteral stone, bacteremia    Procedure(s) (LRB):  CYSTOSCOPY  RETROGRADE PYELOGRAM  LEFT URETERAL STENT INSERTION    Anesthesia: General    Description of Procedure: 8mm prox L ureteral stone, radio-opaque. Impacted stone, able to bypass with angled glidewire.    Description of the findings of the procedure: 6Fr x 24cm JJ stent, no string    Estimated Blood Loss: 0cc         Specimens:   Specimen (12h ago, onward)    None

## 2020-07-20 NOTE — HPI
54yo F with h/o nephrolithiasis now admitted with E coli bacteremia. CT scan shows 8mm L proximal ureteral stone with mild L hydronephrosis. She reports pain in epigastric area and throughout back that she feels is c/w her fibromyalgia. Denies dysuria. Fevers for several days.

## 2020-07-20 NOTE — ASSESSMENT & PLAN NOTE
-2015 left ureter stone. Per Urology , status post Uri's, attempted stone extraction and ureter stent placement however postop CT showed residual stone in kidney and non obstructive at that time.  -CT renal stone AP shows moderate hydronephrosis on the left secondary to a 7 x 8 mm stone in the proximal left ureter and mild perinephric stranding on the left.  -Treatment as above

## 2020-07-20 NOTE — ASSESSMENT & PLAN NOTE
"-History noted but no anti-hypertensive medications on home med list  -Was on a "fluid pill" but did not get refill   -BP normal at this time  -Monitor and start norvasc if needed    "

## 2020-07-20 NOTE — CONSULTS
[facility]  Urology  Consult Note    Patient Name: Myra Harrison  MRN: [id number]  Admission Date: 2020  Hospital Length of Stay: 1   Code Status: Full Code   Attending Provider: Xavier Woody MD   Consulting Provider: Yessi Granados MD  Primary Care Physician: Gilles Leyva MD  Principal Problem:Bacteremia due to Escherichia coli    Inpatient consult to Urology  Consult performed by: Yessi Granados MD  Consult ordered by: Esperanza Caraballo NP          Subjective:     HPI:  54yo F with h/o nephrolithiasis now admitted with E coli bacteremia. CT scan shows 8mm L proximal ureteral stone with mild L hydronephrosis. She reports pain in epigastric area and throughout back that she feels is c/w her fibromyalgia. Denies dysuria. Fevers for several days.     Past Medical History:   Diagnosis Date    Asthma     Brain mass     Depression     Fibromyalgia     Hypertension     Seizures 2007    fell and hit head at grocery          Past Surgical History:   Procedure Laterality Date    CARPAL TUNNEL RELEASE      right     SECTION      x 3    cysto/ureteroscopy stent placement      HYSTERECTOMY      TONSILLECTOMY         Review of patient's allergies indicates:   Allergen Reactions    Penicillins Itching       Family History     Problem Relation (Age of Onset)    Cancer Mother, Maternal Grandmother, Maternal Grandfather, Paternal Grandmother, Paternal Grandfather    Diabetes Father    Heart disease Mother          Tobacco Use    Smoking status: Never Smoker    Smokeless tobacco: Never Used   Substance and Sexual Activity    Alcohol use: Yes     Comment: occasionally    Drug use: No    Sexual activity: Yes       Review of Systems   Constitutional: Positive for fever. Negative for appetite change and chills.   HENT: Negative for congestion, sore throat and trouble swallowing.    Eyes: Negative for pain and itching.   Respiratory: Negative for cough and  shortness of breath.    Cardiovascular: Negative for chest pain, palpitations and leg swelling.   Gastrointestinal: Positive for abdominal pain. Negative for abdominal distention, constipation, diarrhea, nausea and vomiting.   Genitourinary: Positive for flank pain. Negative for difficulty urinating, dysuria, hematuria, nocturia and urgency.   Musculoskeletal: Positive for back pain. Negative for neck pain and neck stiffness.   Skin: Negative for rash and wound.   Neurological: Negative for dizziness and seizures.   Hematological: Negative for adenopathy. Does not bruise/bleed easily.   Psychiatric/Behavioral: Negative for confusion. The patient is not nervous/anxious.    All other systems reviewed and are negative.      Objective:     Temp:  [98 °F (36.7 °C)-98.8 °F (37.1 °C)] 98 °F (36.7 °C)  Pulse:  [63-90] 71  Resp:  [18-20] 20  SpO2:  [93 %-100 %] 95 %  BP: (124-159)/(61-94) 124/61     Body mass index is 24.31 kg/m².    Date 07/20/20 0700 - 07/21/20 0659   Shift 9809-8094 5199-2526 2303-3451 24 Hour Total   INTAKE   P.O. 0   0   Shift Total(mL/kg) 0(0)   0(0)   OUTPUT   Shift Total(mL/kg)       Weight (kg) 68.3 68.3 68.3 68.3          Drains     None                 Physical Exam   Vitals reviewed.  Constitutional: She is oriented to person, place, and time. She appears well-developed. No distress.   HENT:   Head: Normocephalic and atraumatic.   Neck: Normal range of motion.   Cardiovascular: Normal rate and regular rhythm.    Pulmonary/Chest: Effort normal and breath sounds normal. No respiratory distress.   Abdominal: Soft. She exhibits no distension and no mass. There is no abdominal tenderness. There is no rebound and no guarding.   Musculoskeletal: Normal range of motion.   Neurological: She is alert and oriented to person, place, and time.   Skin: Skin is warm and dry. No rash noted. She is not diaphoretic. No erythema.     Psychiatric: Her behavior is normal.       Significant Labs:    BMP:  Recent Labs    Lab 07/16/20 1839 07/19/20  1450 07/20/20  0635    141 142   K 3.7 4.1 3.5    107 108   CO2 23 25 27   BUN 10 11 11   CREATININE 0.9 0.9 0.9   CALCIUM 9.7 9.8 8.9       CBC:  Recent Labs   Lab 07/16/20 1839 07/19/20  1440 07/20/20  0635   WBC 9.23 6.77 6.62   HGB 14.1 13.8 12.0   HCT 41.9 42.4 36.7*    304 261       Blood Culture:   Recent Labs   Lab 07/16/20  1925 07/19/20  1440 07/19/20  1550   LABBLOO Gram stain lois bottle: Gram negative rods   Results called to and read back by: Aliyah Michel-TASIA  07/17/2020  07:28  Gram stain aer bottle: Gram negative rods   Positive results previously called  ESCHERICHIA COLI* No Growth to date No Growth to date     Urine Culture: No results for input(s): LABURIN in the last 168 hours.  Urine Studies:   Recent Labs   Lab 07/16/20 2029 07/19/20  1449   COLORU Straw Yellow   APPEARANCEUA Clear Cloudy*   PHUR 6.0 5.0   SPECGRAV 1.005 1.010   PROTEINUA Negative 2+*   GLUCUA Negative Negative   KETONESU Negative Negative   BILIRUBINUA Negative Negative   OCCULTUA 1+* 2+*   NITRITE Negative Negative   UROBILINOGEN Negative Negative   LEUKOCYTESUR 2+* 3+*   RBCUA 2 10*   WBCUA 4 >100*   BACTERIA Rare Moderate*   SQUAMEPITHEL 2  --    HYALINECASTS  --  0       Significant Imaging:  All pertinent imaging results/findings from the past 24 hours have been reviewed.          Assessment and Plan:     * Bacteremia due to Escherichia coli   - May be  source 2/2 ureteral stone. Without typical renal colic symptoms.     Ureteral stone   - 8mm L proximal ureteral stone   - Plan for stent placement today for decompression. Stone not able to be treated today due to infection.    - Will need delayed stone treatment as outpatient.    UTI (urinary tract infection)   - UCx pending        VTE Risk Mitigation (From admission, onward)         Ordered     enoxaparin injection 40 mg  Every 24 hours      07/19/20 2019     IP VTE HIGH RISK PATIENT  Once      07/19/20 3124      Place sequential compression device  Until discontinued      07/19/20 6014                Thank you for your consult. I will follow-up with patient. Please contact us if you have any additional questions.    Yessi Granados MD  Urology  [facility]

## 2020-07-20 NOTE — ANESTHESIA POSTPROCEDURE EVALUATION
Anesthesia Post Evaluation    Patient: Myra Harrison    Procedure(s) Performed: Procedure(s) (LRB):  CYSTOSCOPY, WITH URETERAL STENT INSERTION (Left)  PYELOGRAM, RETROGRADE (N/A)    Final Anesthesia Type: general    Patient location during evaluation: PACU  Patient participation: Yes- Able to Participate  Level of consciousness: awake and alert  Post-procedure vital signs: reviewed and stable  Pain management: adequate  Airway patency: patent    PONV status at discharge: No PONV  Anesthetic complications: no      Cardiovascular status: blood pressure returned to baseline and hemodynamically stable  Respiratory status: unassisted and spontaneous ventilation  Hydration status: euvolemic  Follow-up not needed.          Vitals Value Taken Time   /70 07/20/20 1412   Temp 36.5 °C (97.7 °F) 07/20/20 1334   Pulse 74 07/20/20 1412   Resp 18 07/20/20 1412   SpO2 99 % 07/20/20 1412         Event Time   Out of Recovery 07/20/2020 14:24:55         Pain/Volodymyr Score: Pain Rating Prior to Med Admin: 0 (7/20/2020  1:49 PM)  Pain Rating Post Med Admin: 2 (7/19/2020  9:13 PM)  Volodymyr Score: 9 (7/20/2020  2:06 PM)

## 2020-07-20 NOTE — PLAN OF CARE
DUE TO cOVID 19 unable to go patient's room to discuss patient managing her care at home.  TN called patient at beside    TN Role Explained.  Patient identified by using 2 identifiers:  Name and date of birth    Patient stated that her  WILL HELP AT HOME WITH her RECOVERY as well as her 32 yo daughter who is in town for 2 more weeks      Preferred Pharmacy:    [id number] - [place], [place] - [address]  [address]  [place] [address]  Phone: [phone] Fax: [phone]       07/20/20 1939   Discharge Assessment   Assessment Type Discharge Planning Assessment   Confirmed/corrected address and phone number on facesheet? Yes   Assessment information obtained from? Patient   Expected Length of Stay (days) 3   Communicated expected length of stay with patient/caregiver yes   Prior to hospitilization cognitive status: Alert/Oriented   Prior to hospitalization functional status: Independent   Current cognitive status: Alert/Oriented   Current Functional Status: Needs Assistance   Lives With spouse   Able to Return to Prior Arrangements yes   Is patient able to care for self after discharge? Unable to determine at this time (comments)   Patient's perception of discharge disposition home or selfcare   Readmission Within the Last 30 Days no previous admission in last 30 days   Patient currently being followed by outpatient case management? No   Patient currently receives any other outside agency services? No   Equipment Currently Used at Home none   Part D Coverage N/A   Do you have any problems affording any of your prescribed medications? No  (oNLY IF > $25 (USES GENERIC))   Does the patient have transportation home? Yes   Transportation Anticipated family or friend will provide   Does the patient receive services at the [facility]? No   Discharge Plan A Home with family  (POSSIBLY WITH READY RESPONDERS)   Discharge Plan B Home Health   DME Needed Upon  Discharge    (TBD)   Patient/Family in Agreement with Plan yes

## 2020-07-20 NOTE — ASSESSMENT & PLAN NOTE
E coli bacteremia secondary to UTI in the setting of left ureteral stone. Afebrile and without leukocytosis. Repeat blood cultures on 7/19 remain NGTD.   · Agree with Cipro.   · Can transition to oral formulation if able to take oral intake.   · Will follow up pending cultures.   · Anticipate a 14 day course if ureteral stent is placed.

## 2020-07-20 NOTE — ASSESSMENT & PLAN NOTE
-Has significant cough and apparent significant upper airway viral infection  -Rapid covid is negative and no leukocytosis, hypoxia or infiltration on CXR.  -CRP 57.7      Ferritin 798  -Continue tessalon PRN cough  -Maintain covid isolation for now pending results of repeat covid pcr.

## 2020-07-20 NOTE — PROGRESS NOTES
DIANNA    Discussed case with Esperanza Caraballo NP.  Pt is hemodynamically stable and afebrile currently.  Will make NPO after mn and DIANNA team will see her tomorrow and place stent.  If her condition should worsen, they will notify me and we will place stent tonight.

## 2020-07-20 NOTE — PROGRESS NOTES
[facility] Hospital Medicine  Progress Note    Patient Name: Myra Harrison  MRN: [id number]  Patient Class: IP- Inpatient   Admission Date: 7/19/2020  Length of Stay: 1 days  Attending Physician: Xavier Woody MD  Primary Care Provider: Gilles Leyva MD        Subjective:     Principal Problem:Bacteremia due to Escherichia coli        HPI:  Mrs. Harrison is a 54 yo female with significant history for hypertension, asthma, uterus stone 05/01/2015, chronic fibromyalgia, chronic lower back pain, anxiety, acid reflux, and hx of renal stone 2015 who was told to return to the ED today for E coli bacteremia from blood cultures drawn on 07/16/2020 (3 days ago).  Patient presented to ED 3 days ago for fever 100.2 and productive and nonproductive cough since Thursday (4 days ago).  Urine culture was not obtained at that time.  Patient also reports right groin pain and left flank pain that have been ongoing since Thursday.  Took Tylenol with minimal relief.  Patient also reports ongoing fever since Thursday and 99.9 this morning which subsided with Tylenol.  Patient reports going out to dinner with a couple who did not wear mask 1.5 weeks ago.  Denies recent sick contact.  Patient lives with  who smokes.  Denies smoking, alcohol, or use of illicit drugs.  Patient reports on Xanax p.r.n. and fentanyl recently by Dr. Ramirez however I do not see on   Per urology note 05/01/2015, patient status post URS with attempted stone extraction however post procedure image showed stone in the left kidney at that time.   In ED, UA evidence of infection with 2+ occult blood.  Urine culture and blood culture collected.  Cipro started in ED secondary to allergy to penicillin.  CT renal showed Moderate hydronephrosis on the left secondary to a 7 x 8 mm stone in the proximal left ureter.     Overview/Hospital Course:  No notes on file    Interval History: No acute events overnight.  Denies sob but  complains of dry cough and associated upper abdominal discomfort.  No CP.    Review of Systems   Constitutional: Positive for activity change, appetite change, chills, fatigue and fever.   HENT: Negative for congestion and sneezing.    Respiratory: Negative for shortness of breath. Cough: intermittent productive /nonproductive cough.    Cardiovascular: Negative for chest pain, palpitations and leg swelling.   Gastrointestinal: Negative for abdominal distention and abdominal pain.        Right groin pain but not at present   Endocrine: Negative for cold intolerance and heat intolerance.   Genitourinary: Positive for flank pain and frequency. Negative for decreased urine volume and dysuria.   Musculoskeletal: Positive for arthralgias, back pain and myalgias. Negative for gait problem.   Skin: Negative.    Neurological: Negative for dizziness.   Hematological: Negative for adenopathy.   Psychiatric/Behavioral: Negative for agitation and behavioral problems.     Objective:     Vital Signs (Most Recent):  Temp: 97.7 °F (36.5 °C) (07/20/20 1334)  Pulse: 73 (07/20/20 1350)  Resp: 12 (07/20/20 1350)  BP: (!) 126/59 (07/20/20 1350)  SpO2: 98 % (07/20/20 1350) Vital Signs (24h Range):  Temp:  [97.7 °F (36.5 °C)-98.8 °F (37.1 °C)] 97.7 °F (36.5 °C)  Pulse:  [63-90] 73  Resp:  [12-20] 12  SpO2:  [93 %-100 %] 98 %  BP: (118-159)/(58-94) 126/59     Weight: 68.3 kg (150 lb 9.9 oz)  Body mass index is 24.31 kg/m².    Intake/Output Summary (Last 24 hours) at 7/20/2020 1354  Last data filed at 7/20/2020 1325  Gross per 24 hour   Intake 1608.33 ml   Output 1000 ml   Net 608.33 ml      Physical Exam  Constitutional:       General: She is not in acute distress.     Appearance: Normal appearance. She is not ill-appearing or toxic-appearing.   HENT:      Head: Normocephalic.      Nose: No congestion or rhinorrhea.   Eyes:      Extraocular Movements: Extraocular movements intact.      Pupils: Pupils are equal, round, and reactive to light.    Neck:      Musculoskeletal: Normal range of motion.   Cardiovascular:      Rate and Rhythm: Normal rate.   Pulmonary:      Effort: Pulmonary effort is normal.      Breath sounds: No wheezing.   Abdominal:      General: There is no distension.      Palpations: Abdomen is soft.      Tenderness: There is abdominal tenderness. There is left CVA tenderness.   Musculoskeletal: Normal range of motion.   Skin:     General: Skin is warm and dry.   Neurological:      General: No focal deficit present.      Mental Status: She is alert and oriented to person, place, and time. Mental status is at baseline.   Psychiatric:         Mood and Affect: Mood normal.         Significant Labs: All pertinent labs within the past 24 hours have been reviewed.    Significant Imaging: I have reviewed and interpreted all pertinent imaging results/findings within the past 24 hours.      Assessment/Plan:      * Bacteremia due to Escherichia coli  -Mrs. Harrison was admitted to inpatient status  -Noted 2/4 bottles from blood cultures two days ago (7/16/20) in ER positive for E.coli. Urine culture not collected at that time.   -While not present two days ago and no symptoms of UTI presently, UA now suggestive of UTI.  Noted history of nephrolithiasis and with left flank pain. Non toxic appearing at this time.   -Noted lack of leukocytosis and not septic at this time.    -CT Abd/pelvis renal stone showed mod hydronephrosis on the left secondary to 7/8 mm stone in the proximal left ureter and mild perinephric stranding on the left  -Rapid HIV negative.  PCT minimally elevated.  Echo was unremarkable with no signs of infective endocarditis.    -Urine culture growing E.coli.  Blood cultures NGTD so far.  -Urology consulted and took patient today for cystoscopy with left JJ stent placement  -Continue IV fluids, cipro and flomax  -Await ID recommendations.    UTI (urinary tract infection)  -Treatment as above      Ureteral stone  -2015 left ureter stone.  "Per Urology , status post Uri's, attempted stone extraction and ureter stent placement however postop CT showed residual stone in kidney and non obstructive at that time.  -CT renal stone AP shows moderate hydronephrosis on the left secondary to a 7 x 8 mm stone in the proximal left ureter and mild perinephric stranding on the left.  -Treatment as above    Possible COVID-19 Infection  -Has significant cough and apparent significant upper airway viral infection  -Rapid covid is negative and no leukocytosis, hypoxia or infiltration on CXR.  -CRP 57.7      Ferritin 798  -Continue tessalon PRN cough  -Maintain covid isolation for now pending results of repeat covid pcr.    Asthma  -History noted. Very controlled. Use approximately once every 3 months  -Provide albuterol MDI as needed      Essential hypertension  -History noted but no anti-hypertensive medications on home med list  -Was on a "fluid pill" but did not get refill   -BP normal at this time  -Monitor and start norvasc if needed      Seizure disorder  -History noted. Stated see [facility] Neurologist but I do not see in care everywhere. Reports "was passing out" and last episode 1 year ago.    -Not on anti-epileptic medications at home.   -Follow up with [facility] Neurologist outpatient      Fibromyalgia  -History noted.   -Reports on fentanyl patch but I do not see on . Will not start at this time.   -Toradol PRN       MDD (major depressive disorder)  -History noted  -xanax not on  and per Pain Management Dr. Holbrook-weaned off. Will not start for now.   -Hold citalopram for now given treatment with cipro and interaction          VTE Risk Mitigation (From admission, onward)         Ordered     enoxaparin injection 40 mg  Every 24 hours      07/19/20 2019     IP VTE HIGH RISK PATIENT  Once      07/19/20 1843     Place sequential compression device  Until discontinued      07/19/20 1843                      Xavier Woody MD  Department of Hospital " Medicine   [facility]

## 2020-07-21 VITALS
RESPIRATION RATE: 18 BRPM | OXYGEN SATURATION: 97 % | DIASTOLIC BLOOD PRESSURE: 60 MMHG | HEIGHT: 66 IN | WEIGHT: 150.63 LBS | TEMPERATURE: 98 F | BODY MASS INDEX: 24.21 KG/M2 | SYSTOLIC BLOOD PRESSURE: 118 MMHG | HEART RATE: 65 BPM

## 2020-07-21 LAB
ALBUMIN SERPL BCP-MCNC: 3.1 G/DL (ref 3.5–5.2)
ALP SERPL-CCNC: 65 U/L (ref 55–135)
ALT SERPL W/O P-5'-P-CCNC: 32 U/L (ref 10–44)
ANION GAP SERPL CALC-SCNC: 9 MMOL/L (ref 8–16)
AST SERPL-CCNC: 28 U/L (ref 10–40)
BACTERIA BLD CULT: NORMAL
BACTERIA UR CULT: ABNORMAL
BASOPHILS # BLD AUTO: 0.01 K/UL (ref 0–0.2)
BASOPHILS NFR BLD: 0.2 % (ref 0–1.9)
BILIRUB SERPL-MCNC: 0.2 MG/DL (ref 0.1–1)
BUN SERPL-MCNC: 13 MG/DL (ref 6–20)
CALCIUM SERPL-MCNC: 9.2 MG/DL (ref 8.7–10.5)
CHLORIDE SERPL-SCNC: 107 MMOL/L (ref 95–110)
CO2 SERPL-SCNC: 24 MMOL/L (ref 23–29)
CREAT SERPL-MCNC: 0.8 MG/DL (ref 0.5–1.4)
DIFFERENTIAL METHOD: NORMAL
EOSINOPHIL # BLD AUTO: 0.1 K/UL (ref 0–0.5)
EOSINOPHIL NFR BLD: 0.8 % (ref 0–8)
ERYTHROCYTE [DISTWIDTH] IN BLOOD BY AUTOMATED COUNT: 11.9 % (ref 11.5–14.5)
EST. GFR  (AFRICAN AMERICAN): >60 ML/MIN/1.73 M^2
EST. GFR  (NON AFRICAN AMERICAN): >60 ML/MIN/1.73 M^2
GLUCOSE SERPL-MCNC: 146 MG/DL (ref 70–110)
HCT VFR BLD AUTO: 37.8 % (ref 37–48.5)
HGB BLD-MCNC: 12.3 G/DL (ref 12–16)
IMM GRANULOCYTES # BLD AUTO: 0.03 K/UL (ref 0–0.04)
IMM GRANULOCYTES NFR BLD AUTO: 0.5 % (ref 0–0.5)
LYMPHOCYTES # BLD AUTO: 1.6 K/UL (ref 1–4.8)
LYMPHOCYTES NFR BLD: 24.8 % (ref 18–48)
MAGNESIUM SERPL-MCNC: 2 MG/DL (ref 1.6–2.6)
MCH RBC QN AUTO: 29.2 PG (ref 27–31)
MCHC RBC AUTO-ENTMCNC: 32.5 G/DL (ref 32–36)
MCV RBC AUTO: 90 FL (ref 82–98)
MONOCYTES # BLD AUTO: 0.3 K/UL (ref 0.3–1)
MONOCYTES NFR BLD: 4.6 % (ref 4–15)
NEUTROPHILS # BLD AUTO: 4.4 K/UL (ref 1.8–7.7)
NEUTROPHILS NFR BLD: 69.1 % (ref 38–73)
NRBC BLD-RTO: 0 /100 WBC
PLATELET # BLD AUTO: 295 K/UL (ref 150–350)
PMV BLD AUTO: 10.2 FL (ref 9.2–12.9)
POTASSIUM SERPL-SCNC: 4 MMOL/L (ref 3.5–5.1)
PROT SERPL-MCNC: 6.6 G/DL (ref 6–8.4)
RBC # BLD AUTO: 4.21 M/UL (ref 4–5.4)
SODIUM SERPL-SCNC: 140 MMOL/L (ref 136–145)
WBC # BLD AUTO: 6.3 K/UL (ref 3.9–12.7)

## 2020-07-21 PROCEDURE — 99232 SBSQ HOSP IP/OBS MODERATE 35: CPT | Mod: ,,, | Performed by: INTERNAL MEDICINE

## 2020-07-21 PROCEDURE — 25000003 PHARM REV CODE 250: Performed by: UROLOGY

## 2020-07-21 PROCEDURE — 25000003 PHARM REV CODE 250: Performed by: HOSPITALIST

## 2020-07-21 PROCEDURE — 83735 ASSAY OF MAGNESIUM: CPT

## 2020-07-21 PROCEDURE — 63600175 PHARM REV CODE 636 W HCPCS: Performed by: UROLOGY

## 2020-07-21 PROCEDURE — 99232 SBSQ HOSP IP/OBS MODERATE 35: CPT | Mod: ,,, | Performed by: UROLOGY

## 2020-07-21 PROCEDURE — 99232 PR SUBSEQUENT HOSPITAL CARE,LEVL II: ICD-10-PCS | Mod: ,,, | Performed by: INTERNAL MEDICINE

## 2020-07-21 PROCEDURE — 85025 COMPLETE CBC W/AUTO DIFF WBC: CPT

## 2020-07-21 PROCEDURE — 36415 COLL VENOUS BLD VENIPUNCTURE: CPT

## 2020-07-21 PROCEDURE — 80053 COMPREHEN METABOLIC PANEL: CPT

## 2020-07-21 PROCEDURE — 99232 PR SUBSEQUENT HOSPITAL CARE,LEVL II: ICD-10-PCS | Mod: ,,, | Performed by: UROLOGY

## 2020-07-21 RX ORDER — FLUCONAZOLE 150 MG/1
150 TABLET ORAL ONCE AS NEEDED
Qty: 1 TABLET | Refills: 0 | Status: SHIPPED | OUTPATIENT
Start: 2020-07-21 | End: 2020-07-21

## 2020-07-21 RX ORDER — CIPROFLOXACIN 500 MG/1
500 TABLET ORAL EVERY 12 HOURS
Qty: 24 TABLET | Refills: 0 | Status: SHIPPED | OUTPATIENT
Start: 2020-07-21 | End: 2020-08-02

## 2020-07-21 RX ORDER — GABAPENTIN 300 MG/1
300 CAPSULE ORAL 3 TIMES DAILY
COMMUNITY
Start: 2020-05-27

## 2020-07-21 RX ORDER — ONDANSETRON 4 MG/1
4 TABLET, ORALLY DISINTEGRATING ORAL EVERY 6 HOURS PRN
Qty: 20 TABLET | Refills: 0 | Status: SHIPPED | OUTPATIENT
Start: 2020-07-21

## 2020-07-21 RX ORDER — PANTOPRAZOLE SODIUM 20 MG/1
20 TABLET, DELAYED RELEASE ORAL DAILY
Qty: 30 TABLET | Refills: 0 | Status: SHIPPED | OUTPATIENT
Start: 2020-07-21 | End: 2021-07-21

## 2020-07-21 RX ORDER — TRIAMTERENE AND HYDROCHLOROTHIAZIDE 37.5; 25 MG/1; MG/1
1 CAPSULE ORAL DAILY
Qty: 90 CAPSULE | Refills: 1 | Status: SHIPPED | OUTPATIENT
Start: 2020-07-21

## 2020-07-21 RX ORDER — ERGOCALCIFEROL 1.25 MG/1
50000 CAPSULE ORAL WEEKLY
COMMUNITY

## 2020-07-21 RX ORDER — LIDOCAINE 50 MG/G
1 PATCH TOPICAL
Status: DISCONTINUED | OUTPATIENT
Start: 2020-07-21 | End: 2020-07-21 | Stop reason: HOSPADM

## 2020-07-21 RX ORDER — IBUPROFEN 200 MG
1 TABLET ORAL DAILY
Status: DISCONTINUED | OUTPATIENT
Start: 2020-07-21 | End: 2020-07-21

## 2020-07-21 RX ORDER — TRIAMTERENE AND HYDROCHLOROTHIAZIDE 37.5; 25 MG/1; MG/1
1 CAPSULE ORAL DAILY
Status: ON HOLD | COMMUNITY
Start: 2019-09-16 | End: 2020-07-21 | Stop reason: SDUPTHER

## 2020-07-21 RX ADMIN — TAMSULOSIN HYDROCHLORIDE 0.4 MG: 0.4 CAPSULE ORAL at 08:07

## 2020-07-21 RX ADMIN — LIDOCAINE 1 PATCH: 50 PATCH TOPICAL at 12:07

## 2020-07-21 RX ADMIN — OXYBUTYNIN CHLORIDE 10 MG: 5 TABLET, EXTENDED RELEASE ORAL at 08:07

## 2020-07-21 RX ADMIN — ACETAMINOPHEN 650 MG: 325 TABLET ORAL at 12:07

## 2020-07-21 RX ADMIN — PANTOPRAZOLE SODIUM 40 MG: 40 TABLET, DELAYED RELEASE ORAL at 08:07

## 2020-07-21 RX ADMIN — CIPROFLOXACIN 400 MG: 2 INJECTION, SOLUTION INTRAVENOUS at 08:07

## 2020-07-21 RX ADMIN — PHENAZOPYRIDINE 100 MG: 100 TABLET ORAL at 08:07

## 2020-07-21 NOTE — NURSING
Pt aaox4, free of falls or injuries. Discharge instructions given to pt. Pt off floor via WC per charge nurse. No signs of distress noted

## 2020-07-21 NOTE — SUBJECTIVE & OBJECTIVE
"Interval History: "OK". E coli bacteremia due to UTI in the setting of ureteral stone. S/P left ureteral stent on 7/20. Repeat blood cultures 7/19 remain NGTD.     Review of Systems   Constitutional: Negative for chills, fatigue, fever and unexpected weight change.   HENT: Negative for ear pain, facial swelling, hearing loss, mouth sores, nosebleeds, rhinorrhea, sinus pressure, sore throat, tinnitus, trouble swallowing and voice change.    Eyes: Negative for photophobia, pain, redness and visual disturbance.   Respiratory: Negative for cough, chest tightness, shortness of breath and wheezing.    Cardiovascular: Negative for chest pain, palpitations and leg swelling.   Gastrointestinal: Negative for abdominal pain, blood in stool, constipation, diarrhea, nausea and vomiting.   Endocrine: Negative for cold intolerance, heat intolerance, polydipsia, polyphagia and polyuria.   Genitourinary: Negative for decreased urine volume, dysuria, flank pain, frequency, hematuria, menstrual problem, urgency, vaginal bleeding, vaginal discharge and vaginal pain.   Musculoskeletal: Negative for arthralgias, back pain, joint swelling, myalgias and neck pain.   Skin: Negative for rash.   Allergic/Immunologic: Negative for environmental allergies, food allergies and immunocompromised state.   Neurological: Negative for dizziness, seizures, syncope, weakness, light-headedness, numbness and headaches.   Hematological: Negative for adenopathy. Does not bruise/bleed easily.   Psychiatric/Behavioral: Negative for confusion, hallucinations, self-injury, sleep disturbance and suicidal ideas. The patient is not nervous/anxious.      Objective:     Vital Signs (Most Recent):  Temp: 98.4 °F (36.9 °C) (07/21/20 0717)  Pulse: 64 (07/21/20 0717)  Resp: 18 (07/21/20 0717)  BP: 137/62 (07/21/20 0717)  SpO2: 95 % (07/21/20 0717) Vital Signs (24h Range):  Temp:  [97.5 °F (36.4 °C)-98.7 °F (37.1 °C)] 98.4 °F (36.9 °C)  Pulse:  [60-86] 64  Resp:  [12-18] " 18  SpO2:  [94 %-100 %] 95 %  BP: (118-149)/(58-77) 137/62     Weight: 68.3 kg (150 lb 9.9 oz)  Body mass index is 24.31 kg/m².    Estimated Creatinine Clearance: 75.3 mL/min (based on SCr of 0.8 mg/dL).    Physical Exam  Vitals signs and nursing note reviewed.   Constitutional:       General: She is not in acute distress.     Appearance: Normal appearance. She is well-developed. She is not toxic-appearing or diaphoretic.   HENT:      Head: Normocephalic and atraumatic. No right periorbital erythema or left periorbital erythema.      Right Ear: Hearing and external ear normal. No swelling.      Left Ear: Hearing and external ear normal. No swelling.      Nose: Nose normal. No nasal deformity.      Mouth/Throat:      Mouth: Mucous membranes are moist.      Pharynx: Uvula midline.   Eyes:      General: Lids are normal. No scleral icterus.        Right eye: No discharge.         Left eye: No discharge.      Conjunctiva/sclera:      Right eye: Right conjunctiva is not injected. No exudate.     Left eye: Left conjunctiva is not injected. No exudate.  Neck:      Musculoskeletal: Normal range of motion and neck supple.      Thyroid: No thyromegaly.      Trachea: No tracheal deviation.   Cardiovascular:      Rate and Rhythm: Normal rate and regular rhythm.      Heart sounds: Normal heart sounds, S1 normal and S2 normal. No murmur. No friction rub. No gallop.    Pulmonary:      Effort: Pulmonary effort is normal. No tachypnea, accessory muscle usage or respiratory distress.      Breath sounds: Normal breath sounds. No stridor. No wheezing or rales.   Chest:      Chest wall: No tenderness.   Abdominal:      General: Bowel sounds are normal. There is no distension.      Palpations: Abdomen is soft.      Tenderness: There is no abdominal tenderness. There is no guarding or rebound.   Musculoskeletal: Normal range of motion.         General: No tenderness.   Skin:     General: Skin is warm and dry.      Coloration: Skin is not  pale.      Findings: No erythema, lesion or rash.      Nails: There is no clubbing.     Neurological:      Mental Status: She is alert, oriented to person, place, and time and easily aroused.      Cranial Nerves: No cranial nerve deficit.      Coordination: Coordination normal.   Psychiatric:         Speech: Speech normal.         Behavior: Behavior normal. Behavior is cooperative.         Thought Content: Thought content normal.         Judgment: Judgment normal.         Significant Labs:   Blood Culture:   Recent Labs   Lab 07/16/20  1839 07/16/20  1925 07/19/20  1440 07/19/20  1550   LABBLOO No Growth to date  No Growth to date  No Growth to date  No Growth to date  No Growth to date Gram stain lois bottle: Gram negative rods   Results called to and read back by: Aliyah Michel-TASIA  07/17/2020  07:28  Gram stain aer bottle: Gram negative rods   Positive results previously called  ESCHERICHIA COLI* No Growth to date  No Growth to date No Growth to date  No Growth to date     BMP:   Recent Labs   Lab 07/21/20  0533   *      K 4.0      CO2 24   BUN 13   CREATININE 0.8   CALCIUM 9.2   MG 2.0     CBC:   Recent Labs   Lab 07/19/20  1440 07/20/20  0635 07/21/20  0533   WBC 6.77 6.62 6.30   HGB 13.8 12.0 12.3   HCT 42.4 36.7* 37.8    261 295     Urine Culture:   Recent Labs   Lab 07/19/20  1449   LABURIN ESCHERICHIA COLI  >100,000 cfu/ml  *     Urine Studies:   Recent Labs   Lab 07/16/20 2029 07/19/20  1449   COLORU Straw Yellow   APPEARANCEUA Clear Cloudy*   PHUR 6.0 5.0   SPECGRAV 1.005 1.010   PROTEINUA Negative 2+*   GLUCUA Negative Negative   KETONESU Negative Negative   BILIRUBINUA Negative Negative   OCCULTUA 1+* 2+*   NITRITE Negative Negative   UROBILINOGEN Negative Negative   LEUKOCYTESUR 2+* 3+*   RBCUA 2 10*   WBCUA 4 >100*   BACTERIA Rare Moderate*   SQUAMEPITHEL 2  --    HYALINECASTS  --  0       Significant Imaging: I have reviewed all pertinent imaging results/findings  within the past 24 hours.

## 2020-07-21 NOTE — PROGRESS NOTES
TN attempted to schedule PCP appointment.  Answering service taking calls.  Message left and waiting for call back.

## 2020-07-21 NOTE — NURSING
Received lying in bed, alert and orientated x3, resp even and nonlabored, lungs clear, iv to right hand intact without redness or swelling, abd soft, voiding without difficult straw colored urine, scd's in use, ns infusing without difficult to right hand per pump, sr up x2 for safety, call light in reach

## 2020-07-21 NOTE — DISCHARGE SUMMARY
[facility] Hospital Medicine  Discharge Summary      Patient Name: Myra Harrison  MRN: [id number]  Admission Date: 7/19/2020  Hospital Length of Stay: 2 days  Discharge Date and Time:  07/21/2020 8:57 AM  Attending Physician: Shelia Sanchez MD   Discharging Provider: Shelia Sanchez MD  Primary Care Provider: Ky Mann MD      HPI:   Mrs. Harrison is a 54 yo female with significant history for hypertension, asthma, uterus stone 05/01/2015, chronic fibromyalgia, chronic lower back pain, anxiety, acid reflux, and hx of renal stone 2015 who was told to return to the ED today for E coli bacteremia from blood cultures drawn on 07/16/2020 (3 days ago).  Patient presented to ED 3 days ago for fever 100.2 and productive and nonproductive cough since Thursday (4 days ago).  Urine culture was not obtained at that time.  Patient also reports right groin pain and left flank pain that have been ongoing since Thursday.  Took Tylenol with minimal relief.  Patient also reports ongoing fever since Thursday and 99.9 this morning which subsided with Tylenol.  Patient reports going out to dinner with a couple who did not wear mask 1.5 weeks ago.  Denies recent sick contact.  Patient lives with  who smokes.  Denies smoking, alcohol, or use of illicit drugs.  Patient reports on Xanax p.r.n. and fentanyl recently by Dr. Ramirez however I do not see on   Per urology note 05/01/2015, patient status post URS with attempted stone extraction however post procedure image showed stone in the left kidney at that time.   In ED, UA evidence of infection with 2+ occult blood.  Urine culture and blood culture collected.  Cipro started in ED secondary to allergy to penicillin.  CT renal showed Moderate hydronephrosis on the left secondary to a 7 x 8 mm stone in the proximal left ureter.     Procedure(s) (LRB):  CYSTOSCOPY, WITH URETERAL STENT INSERTION (Left)  PYELOGRAM, RETROGRADE (N/A)      Hospital  Course:   55 year old woman with history of hypertension, asthma, fibromyalgia, gerd and ureterolithiasis who returned after being notified by ER of 1/2 blood cultures obtained 7/16 being positive for E.coli.  She was found to have a 8mm left ureteral stone and was taken by urology 7/20 for cystoscopy with placement of a left JJ stent.  She is being treated with cipro.  Repeat blood cultures 7/19 negative. Urine culture with EColi also. Discussed treatment of bacteremia with patient. Discussed plan for cipro x2 weeks. Discussed potential tendon side effects with patient, and she agrees to cipro. No evidence of infective endocarditis on echo.  ID agrees with plan of care. Prescribed fluconazole 150mg x1 PRN yeast infection as patient states that she frequently has yeast infections while on antibiotics. QTc checked and OK for cipro + citalopram (home med). Follow up with Urology.     Also noted to have significant cough and viral appearing URI for several weeks.  Rapid covid and COVID PCR negative. No hypoxia. Can follow up with PCP.     Discussed plan of care with patient and her .      Consults:   Consults (From admission, onward)        Status Ordering Provider     Inpatient consult to Infectious Diseases  Once     Provider:  Xavier Woody MD    Completed ANITA CANTOR     Inpatient consult to Urology  Once     Provider:  Anita Cantor MD    Completed CEZAR LAM          No new Assessment & Plan notes have been filed under this hospital service since the last note was generated.  Service: Hospital Medicine    Final Active Diagnoses:    Diagnosis Date Noted POA    PRINCIPAL PROBLEM:  Bacteremia due to Escherichia coli [R78.81] 07/19/2020 Yes    E. coli UTI [N39.0, B96.20] 07/19/2020 Yes    Possible COVID-19 Infection [U07.1] 07/19/2020 Yes    Essential hypertension [I10] 07/19/2020 Yes    Seizure disorder [G40.909] 07/19/2020 Yes    Asthma [J45.909] 07/19/2020 Yes    MDD  (major depressive disorder) [F32.9] 07/19/2020 Yes    Ureteral stone [Z87.442] 07/19/2020 Yes    Fibromyalgia [M79.7] 08/06/2018 Yes      Problems Resolved During this Admission:    Diagnosis Date Noted Date Resolved POA    E coli bacteremia [R78.81] 07/20/2020 07/20/2020 Yes    Bacteremia [R78.81] 07/19/2020 07/19/2020 Yes       Discharged Condition: good    Disposition: Home or Self Care    Follow Up:  Follow-up Information     Ky Mann MD.    Specialty: General Practice  Contact information:  [address] [place] [address]  [address]  [place] [id number]  [phone]             Yolanda Valladares NP. Schedule an appointment as soon as possible for a visit in 1 week.    Specialty: Urology  Contact information:  [address]  [address]  [place] [address]  [phone]                 Patient Instructions:      Diet Adult Regular     Notify your health care provider if you experience any of the following:  temperature >100.4     Notify your health care provider if you experience any of the following:  persistent nausea and vomiting or diarrhea     Notify your health care provider if you experience any of the following:  severe uncontrolled pain     Notify your health care provider if you experience any of the following:  redness, tenderness, or signs of infection (pain, swelling, redness, odor or green/yellow discharge around incision site)     Notify your health care provider if you experience any of the following:  difficulty breathing or increased cough     Notify your health care provider if you experience any of the following:  severe persistent headache     Notify your health care provider if you experience any of the following:  worsening rash     Notify your health care provider if you experience any of the following:  persistent dizziness, light-headedness, or visual disturbances     Notify your health care provider if you experience any of the following:  increased confusion or weakness      Activity as tolerated       Significant Diagnostic Studies: Labs: All labs within the past 24 hours have been reviewed    Pending Diagnostic Studies:     None         Medications:  Reconciled Home Medications:      Medication List      START taking these medications    ciprofloxacin HCl 500 MG tablet  Commonly known as: CIPRO  Take 1 tablet (500 mg total) by mouth every 12 (twelve) hours. for 12 days     fluconazole 150 MG Tab  Commonly known as: DIFLUCAN  Take 1 tablet (150 mg total) by mouth once as needed (for yeast infection).        CONTINUE taking these medications    albuterol 90 mcg/actuation inhaler  Commonly known as: PROVENTIL/VENTOLIN HFA  Inhale 1-2 puffs into the lungs every 4 (four) hours as needed for Shortness of Breath. Rescue     citalopram 10 MG tablet  Commonly known as: CELEXA  Take 10 mg by mouth once daily.     ergocalciferol 50,000 unit Cap  Commonly known as: ERGOCALCIFEROL  Take 50,000 Units by mouth once a week.     gabapentin 300 MG capsule  Commonly known as: NEURONTIN  Take 300 mg by mouth 3 (three) times daily.     ondansetron 4 MG Tbdl  Commonly known as: ZOFRAN-ODT  Take 1 tablet (4 mg total) by mouth every 6 (six) hours as needed (Nausea).     pantoprazole 20 MG tablet  Commonly known as: PROTONIX  Take 1 tablet (20 mg total) by mouth once daily.     triamterene-hydrochlorothiazide 37.5-25 mg 37.5-25 mg per capsule  Commonly known as: DYAZIDE  Take 1 capsule by mouth once daily.        STOP taking these medications    oxybutynin 10 MG 24 hr tablet  Commonly known as: DITROPAN-XL     phentermine 37.5 MG capsule            Indwelling Lines/Drains at time of discharge:   Lines/Drains/Airways     Drain                 Ureteral Drain/Stent 07/20/20 1318 Left ureter 6 Fr. less than 1 day                Time spent on the discharge of patient: 40 minutes  Patient was seen and examined on the date of discharge and determined to be suitable for discharge.         Shelia Sanchez  MD  Department of Hospital Medicine  [facility]

## 2020-07-21 NOTE — ASSESSMENT & PLAN NOTE
E coli bacteremia secondary to UTI in the setting of left ureteral stone. Afebrile and without leukocytosis. Repeat blood cultures on 7/19 remain NGTD. S/P ureteral stent placement.   · Agree with Cipro.   · Can transition to oral formulation if able to take oral intake.   · Recommend a 14 day course from stent placement.   · Follow up with Urology as planned.

## 2020-07-21 NOTE — PROGRESS NOTES
TN taught Symptoms and Problems for URINARY STENT home care with pt and  with teach back:  1. CLOTS IN URINE 2. STENT APPEARING AT THE ENTRANCE OF THE URETHRA. TN placed education sheet in Prezi..     Help at home will be from  assisting in pt's recovery.     TN taught patient about things SHE is responsible for when discharged TO HELP WITH HIS RECOVERY:  Particularly on how to Manage HER Care At Home:  1. Getting her prescriptions filled.  2. Taking her medications as directed. DO NOT MISS ANY DOSES!  3. Going to her follow-up doctor appointments.   .  Patience Gould, RN, BSN, STN CCM

## 2020-07-21 NOTE — PROGRESS NOTES
[facility]  Urology  Progress Note    Patient Name: Myra Harrison  MRN: [id number]  Admission Date: 7/19/2020  Hospital Length of Stay: 2 days  Code Status: Full Code   Attending Provider: Shelia Sanchez MD   Primary Care Physician: Ky Mann MD    Subjective:     HPI:  56yo F with h/o nephrolithiasis now admitted with E coli bacteremia. CT scan shows 8mm L proximal ureteral stone with mild L hydronephrosis. She reports pain in epigastric area and throughout back that she feels is c/w her fibromyalgia. Denies dysuria. Fevers for several days.     Interval History: she is voiding.  Some flank pain noted, but improved.    Review of Systems   Constitutional: Negative.  Negative for fever.   HENT: Negative.    Eyes: Negative.    Respiratory: Negative for cough, chest tightness and shortness of breath.    Cardiovascular: Negative for chest pain.   Gastrointestinal: Negative.  Negative for constipation, diarrhea and nausea.   Genitourinary: Positive for flank pain and urgency.   Neurological: Negative.    Psychiatric/Behavioral: Negative.      Objective:     Temp:  [97.5 °F (36.4 °C)-98.7 °F (37.1 °C)] 98.4 °F (36.9 °C)  Pulse:  [60-86] 64  Resp:  [12-19] 18  SpO2:  [94 %-100 %] 95 %  BP: (118-149)/(58-77) 137/62     Body mass index is 24.31 kg/m².           Drains     Drain                 Ureteral Drain/Stent 07/20/20 1318 Left ureter 6 Fr. less than 1 day                Physical Exam   Nursing note and vitals reviewed.  Constitutional: She is oriented to person, place, and time. She appears well-developed.   HENT:   Head: Normocephalic.   Eyes: Conjunctivae are normal.   Neck: Normal range of motion. No tracheal deviation present. No thyromegaly present.   Cardiovascular: Normal rate, normal heart sounds and normal pulses.    Pulmonary/Chest: Effort normal and breath sounds normal. No respiratory distress. She has no wheezes.   Abdominal: Soft. She exhibits no distension and no  mass. There is no abdominal tenderness. There is no rebound and no guarding. No hernia.   Musculoskeletal: Normal range of motion. No tenderness.   Lymphadenopathy:     She has no cervical adenopathy.   Neurological: She is alert and oriented to person, place, and time.   Skin: Skin is warm and dry. No rash noted. No erythema.     Psychiatric: Her behavior is normal. Judgment and thought content normal.       Significant Labs:    BMP:  Recent Labs   Lab 07/16/20  1839 07/19/20  1450 07/20/20  0635    141 142   K 3.7 4.1 3.5    107 108   CO2 23 25 27   BUN 10 11 11   CREATININE 0.9 0.9 0.9   CALCIUM 9.7 9.8 8.9       CBC:   Recent Labs   Lab 07/19/20  1440 07/20/20  0635 07/21/20  0533   WBC 6.77 6.62 6.30   HGB 13.8 12.0 12.3   HCT 42.4 36.7* 37.8    261 295       Blood Culture:   Recent Labs   Lab 07/16/20  1925 07/19/20  1440 07/19/20  1550   LABBLOO Gram stain lois bottle: Gram negative rods   Results called to and read back by: Aliyah Michel-TASIA  07/17/2020  07:28  Gram stain aer bottle: Gram negative rods   Positive results previously called  ESCHERICHIA COLI* No Growth to date  No Growth to date No Growth to date  No Growth to date     Urine Culture:   Recent Labs   Lab 07/19/20  1449   LABURIN PRESUMPTIVE E COLI  >100,000 cfu/ml  Identification and susceptibility pending  *       Significant Imaging:                    Assessment/Plan:     * Bacteremia due to Escherichia coli   - May be  source 2/2 ureteral stone. Without typical renal colic symptoms.   Follow cultures, treat x 2 weeks    Ureteral stone   - 8mm L proximal ureteral stone  S/p ureteral stent placement 7/20/2020 by Dr. Granados  Follow up in 1-2 weeks to set up ureteroscopy  Okay to d/c home after cultures finalize    E. coli UTI   - UCx pending        VTE Risk Mitigation (From admission, onward)         Ordered     enoxaparin injection 40 mg  Every 24 hours      07/19/20 2019     IP VTE HIGH RISK PATIENT  Once       07/19/20 1843     Place sequential compression device  Until discontinued      07/19/20 1843                VITOR Coulter MD  Urology  [facility]

## 2020-07-21 NOTE — PLAN OF CARE
"EDUCATION:  Ms Berman provided with educational information on Covid 19 precautions.  Information reviewed and placed in :My Healthcare Packet" to be brought home for her and her family to use as resource after discharge.  Information included:  problems and symptoms to look for and call the doctor if experiencing, and symptoms that may indicate a medical emergency: CALL 911.      All questions answered.  Teach back method used.    Patient stated, "I need to wear a mask and wash my hands for 20 seconds".    EDUCATION:  Things You are responsible For To Manage Your Care At Home:  1.    Getting your prescriptions filled   2.    Taking your medications as directed, DO NOT MISS ANY DOSES!  3.    Going to your follow-up doctor appointment. This is important because it  allow the doctor to monitor your progress and determine if  any changes need to made to your treatment plan.  Call [facility] number for new or repeated problems / symptoms   Call 911 for CP and / or SOB    Patient agreed to all above and verbalized understanding    Unable to schedule patient's PCP follow up as no return call received from office.  Patient stated that she will call tomorrow to self schedule.  She declined visit with [facility].       07/21/20 1143   Final Note   Assessment Type Final Discharge Note   Anticipated Discharge Disposition Home   Hospital Follow Up  Appt(s) scheduled? Yes   Discharge plans and expectations educations in teach back method with documentation complete? Yes   Right Care Referral Info   Post Acute Recommendation No Care   Post-Acute Status   Post-Acute Authorization Other   Other Status No Post-Acute Service Needs   Discharge Delays None known at this time     NurseRadha notified that all CM needs are met     "

## 2020-07-21 NOTE — PROGRESS NOTES
"[facility]  Infectious Disease  Progress Note    Patient Name: Myra Harrison  MRN: [id number]  Admission Date: 7/19/2020  Length of Stay: 2 days  Attending Physician: Shelia Sanchez MD  Primary Care Provider: Ky Mann MD    Isolation Status: No active isolations  Assessment/Plan:      * Bacteremia due to Escherichia coli  E coli bacteremia secondary to UTI in the setting of left ureteral stone. Afebrile and without leukocytosis. Repeat blood cultures on 7/19 remain NGTD. S/P ureteral stent placement.   · Agree with Cipro.   · Can transition to oral formulation if able to take oral intake.   · Recommend a 14 day course from stent placement.   · Follow up with Urology as planned.     E. coli UTI  See above      Ureteral stone  See above        Anticipated Disposition: home    Thank you for your consult. I will sign off. Please contact us if you have any additional questions.    Louann Todd MD  Infectious Disease  [facility]    Subjective:     Principal Problem:Bacteremia due to Escherichia coli    HPI: A 55-year-old woman with HTN, chronic fibromyalgia, chronic back pain, and past nephrolithiasis whom 3 weeks prior to admission developed a cough productive of clear yellow phlegm. She was evaluated in [facility] after she developed SOB, nausea, vomiting, chills, and ongoing myalgias. She was evaluated and discharged after initial work up was unrevealing. She was called back for admission after blood cultures became positive for E coli. On repeat examination urinalysis showed evidence of pyuria. Urine culture now with presumptive E coli. She was admitted and started on Cipro. CT showed evidence of a left ureteral stone.     Mrs. Harrison has hives allergy to penicillin. She has a pet dog. She consumes alcohol socially. No recent travel.     Infectious Diseases consulted for antibiotic recommendations.         Interval History: "OK". E coli bacteremia due " to UTI in the setting of ureteral stone. S/P left ureteral stent on 7/20. Repeat blood cultures 7/19 remain NGTD.     Review of Systems   Constitutional: Negative for chills, fatigue, fever and unexpected weight change.   HENT: Negative for ear pain, facial swelling, hearing loss, mouth sores, nosebleeds, rhinorrhea, sinus pressure, sore throat, tinnitus, trouble swallowing and voice change.    Eyes: Negative for photophobia, pain, redness and visual disturbance.   Respiratory: Negative for cough, chest tightness, shortness of breath and wheezing.    Cardiovascular: Negative for chest pain, palpitations and leg swelling.   Gastrointestinal: Negative for abdominal pain, blood in stool, constipation, diarrhea, nausea and vomiting.   Endocrine: Negative for cold intolerance, heat intolerance, polydipsia, polyphagia and polyuria.   Genitourinary: Negative for decreased urine volume, dysuria, flank pain, frequency, hematuria, menstrual problem, urgency, vaginal bleeding, vaginal discharge and vaginal pain.   Musculoskeletal: Negative for arthralgias, back pain, joint swelling, myalgias and neck pain.   Skin: Negative for rash.   Allergic/Immunologic: Negative for environmental allergies, food allergies and immunocompromised state.   Neurological: Negative for dizziness, seizures, syncope, weakness, light-headedness, numbness and headaches.   Hematological: Negative for adenopathy. Does not bruise/bleed easily.   Psychiatric/Behavioral: Negative for confusion, hallucinations, self-injury, sleep disturbance and suicidal ideas. The patient is not nervous/anxious.      Objective:     Vital Signs (Most Recent):  Temp: 98.4 °F (36.9 °C) (07/21/20 0717)  Pulse: 64 (07/21/20 0717)  Resp: 18 (07/21/20 0717)  BP: 137/62 (07/21/20 0717)  SpO2: 95 % (07/21/20 0717) Vital Signs (24h Range):  Temp:  [97.5 °F (36.4 °C)-98.7 °F (37.1 °C)] 98.4 °F (36.9 °C)  Pulse:  [60-86] 64  Resp:  [12-18] 18  SpO2:  [94 %-100 %] 95 %  BP:  (118-149)/(58-77) 137/62     Weight: 68.3 kg (150 lb 9.9 oz)  Body mass index is 24.31 kg/m².    Estimated Creatinine Clearance: 75.3 mL/min (based on SCr of 0.8 mg/dL).    Physical Exam  Vitals signs and nursing note reviewed.   Constitutional:       General: She is not in acute distress.     Appearance: Normal appearance. She is well-developed. She is not toxic-appearing or diaphoretic.   HENT:      Head: Normocephalic and atraumatic. No right periorbital erythema or left periorbital erythema.      Right Ear: Hearing and external ear normal. No swelling.      Left Ear: Hearing and external ear normal. No swelling.      Nose: Nose normal. No nasal deformity.      Mouth/Throat:      Mouth: Mucous membranes are moist.      Pharynx: Uvula midline.   Eyes:      General: Lids are normal. No scleral icterus.        Right eye: No discharge.         Left eye: No discharge.      Conjunctiva/sclera:      Right eye: Right conjunctiva is not injected. No exudate.     Left eye: Left conjunctiva is not injected. No exudate.  Neck:      Musculoskeletal: Normal range of motion and neck supple.      Thyroid: No thyromegaly.      Trachea: No tracheal deviation.   Cardiovascular:      Rate and Rhythm: Normal rate and regular rhythm.      Heart sounds: Normal heart sounds, S1 normal and S2 normal. No murmur. No friction rub. No gallop.    Pulmonary:      Effort: Pulmonary effort is normal. No tachypnea, accessory muscle usage or respiratory distress.      Breath sounds: Normal breath sounds. No stridor. No wheezing or rales.   Chest:      Chest wall: No tenderness.   Abdominal:      General: Bowel sounds are normal. There is no distension.      Palpations: Abdomen is soft.      Tenderness: There is no abdominal tenderness. There is no guarding or rebound.   Musculoskeletal: Normal range of motion.         General: No tenderness.   Skin:     General: Skin is warm and dry.      Coloration: Skin is not pale.      Findings: No erythema,  lesion or rash.      Nails: There is no clubbing.     Neurological:      Mental Status: She is alert, oriented to person, place, and time and easily aroused.      Cranial Nerves: No cranial nerve deficit.      Coordination: Coordination normal.   Psychiatric:         Speech: Speech normal.         Behavior: Behavior normal. Behavior is cooperative.         Thought Content: Thought content normal.         Judgment: Judgment normal.         Significant Labs:   Blood Culture:   Recent Labs   Lab 07/16/20  1839 07/16/20  1925 07/19/20  1440 07/19/20  1550   LABBLOO No Growth to date  No Growth to date  No Growth to date  No Growth to date  No Growth to date Gram stain lois bottle: Gram negative rods   Results called to and read back by: Aliyah Michel-TASIA  07/17/2020  07:28  Gram stain aer bottle: Gram negative rods   Positive results previously called  ESCHERICHIA COLI* No Growth to date  No Growth to date No Growth to date  No Growth to date     BMP:   Recent Labs   Lab 07/21/20  0533   *      K 4.0      CO2 24   BUN 13   CREATININE 0.8   CALCIUM 9.2   MG 2.0     CBC:   Recent Labs   Lab 07/19/20  1440 07/20/20  0635 07/21/20  0533   WBC 6.77 6.62 6.30   HGB 13.8 12.0 12.3   HCT 42.4 36.7* 37.8    261 295     Urine Culture:   Recent Labs   Lab 07/19/20  1449   LABURIN ESCHERICHIA COLI  >100,000 cfu/ml  *     Urine Studies:   Recent Labs   Lab 07/16/20 2029 07/19/20  1449   COLORU Straw Yellow   APPEARANCEUA Clear Cloudy*   PHUR 6.0 5.0   SPECGRAV 1.005 1.010   PROTEINUA Negative 2+*   GLUCUA Negative Negative   KETONESU Negative Negative   BILIRUBINUA Negative Negative   OCCULTUA 1+* 2+*   NITRITE Negative Negative   UROBILINOGEN Negative Negative   LEUKOCYTESUR 2+* 3+*   RBCUA 2 10*   WBCUA 4 >100*   BACTERIA Rare Moderate*   SQUAMEPITHEL 2  --    HYALINECASTS  --  0       Significant Imaging: I have reviewed all pertinent imaging results/findings within the past 24 hours.

## 2020-07-21 NOTE — ASSESSMENT & PLAN NOTE
- 8mm L proximal ureteral stone  S/p ureteral stent placement 7/20/2020 by Dr. Granados  Follow up in 1-2 weeks to set up ureteroscopy  Okay to d/c home after cultures finalize

## 2020-07-21 NOTE — SUBJECTIVE & OBJECTIVE
Interval History: she is voiding.  Some flank pain noted, but improved.    Review of Systems   Constitutional: Negative.  Negative for fever.   HENT: Negative.    Eyes: Negative.    Respiratory: Negative for cough, chest tightness and shortness of breath.    Cardiovascular: Negative for chest pain.   Gastrointestinal: Negative.  Negative for constipation, diarrhea and nausea.   Genitourinary: Positive for flank pain and urgency.   Neurological: Negative.    Psychiatric/Behavioral: Negative.      Objective:     Temp:  [97.5 °F (36.4 °C)-98.7 °F (37.1 °C)] 98.4 °F (36.9 °C)  Pulse:  [60-86] 64  Resp:  [12-19] 18  SpO2:  [94 %-100 %] 95 %  BP: (118-149)/(58-77) 137/62     Body mass index is 24.31 kg/m².           Drains     Drain                 Ureteral Drain/Stent 07/20/20 1318 Left ureter 6 Fr. less than 1 day                Physical Exam   Nursing note and vitals reviewed.  Constitutional: She is oriented to person, place, and time. She appears well-developed.   HENT:   Head: Normocephalic.   Eyes: Conjunctivae are normal.   Neck: Normal range of motion. No tracheal deviation present. No thyromegaly present.   Cardiovascular: Normal rate, normal heart sounds and normal pulses.    Pulmonary/Chest: Effort normal and breath sounds normal. No respiratory distress. She has no wheezes.   Abdominal: Soft. She exhibits no distension and no mass. There is no abdominal tenderness. There is no rebound and no guarding. No hernia.   Musculoskeletal: Normal range of motion. No tenderness.   Lymphadenopathy:     She has no cervical adenopathy.   Neurological: She is alert and oriented to person, place, and time.   Skin: Skin is warm and dry. No rash noted. No erythema.     Psychiatric: Her behavior is normal. Judgment and thought content normal.       Significant Labs:    BMP:  Recent Labs   Lab 07/16/20  1839 07/19/20  1450 07/20/20  0635    141 142   K 3.7 4.1 3.5    107 108   CO2 23 25 27   BUN 10 11 11    CREATININE 0.9 0.9 0.9   CALCIUM 9.7 9.8 8.9       CBC:   Recent Labs   Lab 07/19/20  1440 07/20/20  0635 07/21/20  0533   WBC 6.77 6.62 6.30   HGB 13.8 12.0 12.3   HCT 42.4 36.7* 37.8    261 295       Blood Culture:   Recent Labs   Lab 07/16/20  1925 07/19/20  1440 07/19/20  1550   LABBLOO Gram stain lois bottle: Gram negative rods   Results called to and read back by: Aliyah Michel-TASIA  07/17/2020  07:28  Gram stain aer bottle: Gram negative rods   Positive results previously called  ESCHERICHIA COLI* No Growth to date  No Growth to date No Growth to date  No Growth to date     Urine Culture:   Recent Labs   Lab 07/19/20  1449   LABURIN PRESUMPTIVE E COLI  >100,000 cfu/ml  Identification and susceptibility pending  *       Significant Imaging:

## 2020-07-22 ENCOUNTER — TELEPHONE (OUTPATIENT)
Dept: UROLOGY | Facility: CLINIC | Age: 55
End: 2020-07-22

## 2020-07-22 ENCOUNTER — PATIENT OUTREACH (OUTPATIENT)
Dept: ADMINISTRATIVE | Facility: CLINIC | Age: 55
End: 2020-07-22

## 2020-07-22 ENCOUNTER — NURSE TRIAGE (OUTPATIENT)
Dept: ADMINISTRATIVE | Facility: CLINIC | Age: 55
End: 2020-07-22

## 2020-07-22 RX ORDER — HYDROCODONE BITARTRATE AND ACETAMINOPHEN 10; 325 MG/1; MG/1
1 TABLET ORAL EVERY 6 HOURS PRN
Qty: 12 TABLET | Refills: 0 | Status: SHIPPED | OUTPATIENT
Start: 2020-07-22 | End: 2020-07-29 | Stop reason: CLARIF

## 2020-07-22 RX ORDER — OXYBUTYNIN CHLORIDE 5 MG/1
5 TABLET ORAL 3 TIMES DAILY PRN
Qty: 30 TABLET | Refills: 0 | Status: SHIPPED | OUTPATIENT
Start: 2020-07-22 | End: 2020-07-29 | Stop reason: CLARIF

## 2020-07-22 NOTE — TELEPHONE ENCOUNTER
Patient was referred to C3 nurse by HELEN Monique LPN, due to pain of 10/10. Patient discharged yesterday for stent placement and kidney stone.  Triage disposition: go to ED now. Advised patient of disposition but patient is unsure whether patient can follow disposition as patient has no ride to ED.  Message sent to Urology for patient concerning pain.  Reason for Disposition   [1] SEVERE pelvic pain AND [2] present > 1 hour    Additional Information   Negative: Shock suspected (e.g., cold/pale/clammy skin, too weak to stand, low BP, rapid pulse)   Negative: Passed out (i.e., lost consciousness, collapsed and was not responding)   Negative: Sounds like a life-threatening emergency to the triager   Negative: [1] SEVERE pelvic pain (e.g., excruciating) AND [2] vomiting   Negative: SEVERE vaginal bleeding (i.e., soaking 2 pads or tampons per hour and present 2 or more hours)    Protocols used: PELVIC PAIN - FEMALE-A-

## 2020-07-22 NOTE — TELEPHONE ENCOUNTER
Spoke to pt she states she has a stent in place an her pain is 10/10 she wasn't given any type pain medication just antibiotics (cipro) she is asking can something please be called into the pharmacy. Please advise-krysten

## 2020-07-22 NOTE — TELEPHONE ENCOUNTER
C3 nurse attempted to contact patient. No answer. The following message was left for the patient to return the call:  Good afternoon I am a nurse calling on behalf of [facility] from the [facility].  This is a Transitional Care Call for Myra Guzmánwalt. When you have a moment please contact us at [phone] or [phone] Monday through Friday, between the hours of 8 am to 4 pm. We look forward to speaking with you. On behalf of [email or web address] have a nice day.    The patient does not have a scheduled HOSFU appointment within 7-14 days post hospital discharge date 7/21/20.

## 2020-07-22 NOTE — PROGRESS NOTES
Pt had returned my call to her. Upon speaking with her she said she was in pain and stated it as a 10/10. Informed me she had a stent put in and has a kidney stone. Mariela PELAEZ triage nurse to call pt back. Pt informed to expect her call.Voices understanding.

## 2020-07-23 ENCOUNTER — TELEPHONE (OUTPATIENT)
Dept: UROLOGY | Facility: CLINIC | Age: 55
End: 2020-07-23

## 2020-07-23 NOTE — PATIENT INSTRUCTIONS
Bacteremia, Suspected (Adult)  Your fever today is probably due to a viral illness. However, sometimes fever can be an early sign of a more serious bacterial infection. Bacteremia is a bacterial infection that has spread to the bloodstream. This is serious because it can spread to other organs, including the kidneys, brain, and lungs.  Your healthcare provider will perform tests (cultures) to check for bacteremia. Until the test results are known you should watch for the signs listed below.  Causes  Bacteremia usually starts with a typical infection, but it then spreads to the blood. Almost any type of infection can cause bacteremia, including a urinary tract infection, skin infection, gastrointestinal problem, surgical complication, or pneumonia.  Symptoms  At first symptoms may seem like any typical infection or illness, but then they worsen. Symptoms of bacteremia can include:  · Fever and chills  · Loss of appetite  · Nausea or vomiting  · Trouble breathing or fast breathing  · Fast heart rate  · Feeling lightheaded or faint  · Skin rashes or blotches  Home care  Follow these guidelines when caring for yourself at home.  · Rest at home for the first 2 to 3 days. When resuming activity, don't let yourself become overly tired.  · You can take acetaminophen or ibuprofen for pain, unless you were given a different pain medicine to use. (Note: If you have chronic liver or kidney disease or have ever had a stomach ulcer or gastrointestinal bleeding, talk with your healthcare provider before using these medicines. Also talk to your provider if you are taking medicine to prevent blood clots.) Aspirin should never be given to anyone younger than 18 years of age who is ill with a viral infection or fever. It may cause severe liver or brain damage.  · If you were given antibiotics, take them until they are used up, or your healthcare provider tells you to stop. It is important to finish the antibiotics even though you  feel better. This is to make sure the infection has cleared.  · Your appetite may be poor, so a light diet is fine. Avoid dehydration by drinking 6 to 8 glasses of fluid per day (such as water, soft drinks, sports drinks, juices, tea, or soup).  Follow-up care  Follow up with your healthcare provider, or as advised.  · If a culture was done, you will be notified if your treatment needs to be changed. You can call as directed for the results.  · If X-rays, a CT, or an ultrasound were done, a specialist will review them. You will be notified of any findings that may affect your care.  Call 911  Contact emergency services right away if any of these occur:  · Trouble breathing or swallowing, or wheezing  · Chest pain  · Confusion or sudden change in behavior  · Extreme drowsiness or trouble awakening  · Fainting or loss of consciousness  · Rapid heart rate  · Low blood pressure  · Vomiting blood, or large amounts of blood in stool  · Seizure  When to seek medical advice  Call your healthcare provider right away if any of these occur:  · Cough with lots of colored sputum (mucus), or blood in your sputum  · Severe headache  · Severe face, neck, throat, or ear pain  · Pain in the right lower abdomen  · Weakness, dizziness, repeated vomiting, or diarrhea  · Joint pain or a new rash  · Burning when urinating  · Fever of 100.4°F (38°C) or higher  Date Last Reviewed: 7/30/2015  © 1661-6363 [employer]. [address] [place] [address], [place] [address]. All rights reserved. This information is not intended as a substitute for professional medical care. Always follow your healthcare professional's instructions.

## 2020-07-23 NOTE — TELEPHONE ENCOUNTER
Informed pt that Norco and Ditropan were sent to the pharmacy for pot-op pain relief. Pt understood - Ramila

## 2020-07-24 LAB
BACTERIA BLD CULT: NORMAL
BACTERIA BLD CULT: NORMAL

## 2020-07-27 ENCOUNTER — OFFICE VISIT (OUTPATIENT)
Dept: UROLOGY | Facility: CLINIC | Age: 55
End: 2020-07-27
Payer: COMMERCIAL

## 2020-07-27 VITALS
SYSTOLIC BLOOD PRESSURE: 131 MMHG | HEIGHT: 66 IN | DIASTOLIC BLOOD PRESSURE: 80 MMHG | BODY MASS INDEX: 24.11 KG/M2 | WEIGHT: 150 LBS

## 2020-07-27 DIAGNOSIS — R78.81 BACTEREMIA DUE TO ESCHERICHIA COLI: ICD-10-CM

## 2020-07-27 DIAGNOSIS — B96.20 BACTEREMIA DUE TO ESCHERICHIA COLI: ICD-10-CM

## 2020-07-27 DIAGNOSIS — N20.1 LEFT URETERAL CALCULUS: ICD-10-CM

## 2020-07-27 DIAGNOSIS — N39.0 E. COLI UTI: ICD-10-CM

## 2020-07-27 DIAGNOSIS — Z87.442 HISTORY OF NEPHROLITHIASIS: Primary | ICD-10-CM

## 2020-07-27 DIAGNOSIS — B96.20 E. COLI UTI: ICD-10-CM

## 2020-07-27 LAB
BILIRUB SERPL-MCNC: ABNORMAL MG/DL
BLOOD URINE, POC: 250
COLOR, POC UA: YELLOW
GLUCOSE UR QL STRIP: ABNORMAL
KETONES UR QL STRIP: ABNORMAL
LEUKOCYTE ESTERASE URINE, POC: ABNORMAL
NITRITE, POC UA: POSITIVE
PH, POC UA: 6
PROTEIN, POC: 500
SPECIFIC GRAVITY, POC UA: 1025
UROBILINOGEN, POC UA: ABNORMAL

## 2020-07-27 PROCEDURE — 99214 PR OFFICE/OUTPT VISIT, EST, LEVL IV, 30-39 MIN: ICD-10-PCS | Mod: 25,S$GLB,, | Performed by: NURSE PRACTITIONER

## 2020-07-27 PROCEDURE — 99214 OFFICE O/P EST MOD 30 MIN: CPT | Mod: 25,S$GLB,, | Performed by: NURSE PRACTITIONER

## 2020-07-27 PROCEDURE — 81001 URINALYSIS AUTO W/SCOPE: CPT | Mod: S$GLB,,, | Performed by: NURSE PRACTITIONER

## 2020-07-27 PROCEDURE — 3008F BODY MASS INDEX DOCD: CPT | Mod: CPTII,S$GLB,, | Performed by: NURSE PRACTITIONER

## 2020-07-27 PROCEDURE — 87086 URINE CULTURE/COLONY COUNT: CPT

## 2020-07-27 PROCEDURE — 3008F PR BODY MASS INDEX (BMI) DOCUMENTED: ICD-10-PCS | Mod: CPTII,S$GLB,, | Performed by: NURSE PRACTITIONER

## 2020-07-27 PROCEDURE — 99999 PR PBB SHADOW E&M-EST. PATIENT-LVL III: ICD-10-PCS | Mod: PBBFAC,,, | Performed by: NURSE PRACTITIONER

## 2020-07-27 PROCEDURE — 81001 POCT URINALYSIS, DIPSTICK OR TABLET REAGENT, AUTOMATED, WITH MICROSCOP: ICD-10-PCS | Mod: S$GLB,,, | Performed by: NURSE PRACTITIONER

## 2020-07-27 PROCEDURE — 99999 PR PBB SHADOW E&M-EST. PATIENT-LVL III: CPT | Mod: PBBFAC,,, | Performed by: NURSE PRACTITIONER

## 2020-07-27 RX ORDER — CIPROFLOXACIN 2 MG/ML
400 INJECTION, SOLUTION INTRAVENOUS
Status: CANCELLED | OUTPATIENT
Start: 2020-07-27

## 2020-07-27 NOTE — PROGRESS NOTES
Subjective:       Patient ID: Myra Harrison is a 55 y.o. female who is an established patient of Dr Granados though new to me was last seen in this office 5/1/15    Chief Complaint:   Chief Complaint   Patient presents with    Other     Pt. is here after stent placement.. and is here to schedule the removal and has some questions that she will ask the provider     Patient was seen as an inpatient consultation by Dr Granados on 7/20/20. Consultation below:    56yo F with h/o nephrolithiasis now admitted with E coli bacteremia. CT scan shows 8mm L proximal ureteral stone with mild L hydronephrosis. She reports pain in epigastric area and throughout back that she feels is c/w her fibromyalgia. Denies dysuria. Fevers for several days    She is s/p cystoscopy/L RPG/ureteral stent placement by Dr Granados on 7/20/20. She was discharged on PO Cipro x 2 weeks. She reports compliance with antibiotics.     She is here today to schedule definitive treatment of her ureteral stone. She is tolerating her stent fairly well. Her pain is controlled with Norco and oxybutynin. Denies fever. No new issues    UCx 7/19-->100k E coli  BCx 7/16 --+ E coli    ACTIVE MEDICAL ISSUES:  Patient Active Problem List   Diagnosis    Nephrolithiasis    Acute post-traumatic headache, not intractable    Chronic pain disorder    Fibromyalgia    Bacteremia due to Escherichia coli    E. coli UTI    Possible COVID-19 Infection    Essential hypertension    Seizure disorder    Asthma    MDD (major depressive disorder)    Ureteral stone       ALLERGIES AND MEDICATIONS: updated and reviewed.  Review of patient's allergies indicates:   Allergen Reactions    Penicillins Itching     Current Outpatient Medications   Medication Sig    albuterol (PROVENTIL/VENTOLIN HFA) 90 mcg/actuation inhaler Inhale 1-2 puffs into the lungs every 4 (four) hours as needed for Shortness of Breath. Rescue    ciprofloxacin HCl (CIPRO) 500 MG tablet Take 1 tablet  "(500 mg total) by mouth every 12 (twelve) hours. for 12 days    citalopram (CELEXA) 10 MG tablet Take 10 mg by mouth once daily.    ergocalciferol (ERGOCALCIFEROL) 50,000 unit Cap Take 50,000 Units by mouth once a week.    gabapentin (NEURONTIN) 300 MG capsule Take 300 mg by mouth 3 (three) times daily.    HYDROcodone-acetaminophen (NORCO)  mg per tablet Take 1 tablet by mouth every 6 (six) hours as needed for Pain.    ondansetron (ZOFRAN-ODT) 4 MG TbDL Take 1 tablet (4 mg total) by mouth every 6 (six) hours as needed (Nausea).    oxybutynin (DITROPAN) 5 MG Tab Take 1 tablet (5 mg total) by mouth 3 (three) times daily as needed (bladder spasms).    pantoprazole (PROTONIX) 20 MG tablet Take 1 tablet (20 mg total) by mouth once daily.    triamterene-hydrochlorothiazide 37.5-25 mg (DYAZIDE) 37.5-25 mg per capsule Take 1 capsule by mouth once daily.     No current facility-administered medications for this visit.        Review of Systems   Constitutional: Negative for activity change, chills, fatigue, fever and unexpected weight change.   Eyes: Negative for discharge, redness and visual disturbance.   Respiratory: Negative for cough, shortness of breath and wheezing.    Cardiovascular: Negative for chest pain and leg swelling.   Gastrointestinal: Negative for abdominal distention, abdominal pain, constipation, diarrhea, nausea and vomiting.   Genitourinary: Negative for decreased urine volume, difficulty urinating, dysuria, frequency, hematuria, pelvic pain, urgency, vaginal bleeding, vaginal discharge and vaginal pain.   Musculoskeletal: Negative for arthralgias, joint swelling and myalgias.   Skin: Negative for color change and rash.   Neurological: Negative for dizziness and light-headedness.   Psychiatric/Behavioral: Negative for behavioral problems and confusion. The patient is not nervous/anxious.        Objective:      Vitals:    07/27/20 1150   BP: 131/80   Weight: 68 kg (150 lb)   Height: 5' 6" " (1.676 m)        Physical Exam   Constitutional: She is oriented to person, place, and time. She appears well-developed.   HENT:   Head: Normocephalic and atraumatic.   Nose: Nose normal.   Eyes: Conjunctivae are normal. Right eye exhibits no discharge. Left eye exhibits no discharge.   Neck: Normal range of motion. Neck supple. No tracheal deviation present. No thyromegaly present.   Cardiovascular: Normal rate and regular rhythm.    Pulmonary/Chest: Effort normal. No respiratory distress. She has no wheezes.   Abdominal: Soft. She exhibits no distension. There is no abdominal tenderness. No hernia.   Genitourinary:    Genitourinary Comments: Patient declined exam     Musculoskeletal: Normal range of motion.   Neurological: She is alert and oriented to person, place, and time.   Skin: Skin is warm and dry. No rash noted. No erythema.     Psychiatric: Her behavior is normal. Judgment normal.       Urine dipstick shows ++LE, +Nitrite, +++protein, 250 RBCs.     Assessment:       1. Ureteral stone    2. E. coli UTI    3. Bacteremia due to Escherichia coli          Plan:       1. Ureteral stone  - 8 mm L proximal ureteral stone  -s/p stent placement on 7/20/20 by Dr Granados  -Plan for left ureteroscopy/LL/stent placement on Friday 7/31/20 with Dr Granados. Risks and benefits of procedure discussed with patient. Patient consented  - POCT urinalysis, dipstick or tablet reag  - Urine culture    2. E. coli UTI  -Continue Cipro  - Urine culture    3. Bacteremia due to Escherichia coli  -BCx--+ E coli  -Continue Cipro             Follow up for 2-3 weeks post op.

## 2020-07-27 NOTE — H&P
Subjective:       Patient ID: Myra Harrison is a 55 y.o. female who is an established patient of Dr Granados though new to me was last seen in this office 5/1/15    Chief Complaint:   Chief Complaint   Patient presents with    Other     Pt. is here after stent placement.. and is here to schedule the removal and has some questions that she will ask the provider     Patient was seen as an inpatient consultation by Dr Granados on 7/20/20. Consultation below:    54yo F with h/o nephrolithiasis now admitted with E coli bacteremia. CT scan shows 8mm L proximal ureteral stone with mild L hydronephrosis. She reports pain in epigastric area and throughout back that she feels is c/w her fibromyalgia. Denies dysuria. Fevers for several days    She is s/p cystoscopy/L RPG/ureteral stent placement by Dr Granados on 7/20/20. She was discharged on PO Cipro x 2 weeks. She reports compliance with antibiotics.     She is here today to schedule definitive treatment of her ureteral stone. She is tolerating her stent fairly well. Her pain is controlled with Norco and oxybutynin. Denies fever. No new issues    UCx 7/19-->100k E coli  BCx 7/16 --+ E coli    ACTIVE MEDICAL ISSUES:  Patient Active Problem List   Diagnosis    Nephrolithiasis    Acute post-traumatic headache, not intractable    Chronic pain disorder    Fibromyalgia    Bacteremia due to Escherichia coli    E. coli UTI    Possible COVID-19 Infection    Essential hypertension    Seizure disorder    Asthma    MDD (major depressive disorder)    Ureteral stone       ALLERGIES AND MEDICATIONS: updated and reviewed.  Review of patient's allergies indicates:   Allergen Reactions    Penicillins Itching     Current Outpatient Medications   Medication Sig    albuterol (PROVENTIL/VENTOLIN HFA) 90 mcg/actuation inhaler Inhale 1-2 puffs into the lungs every 4 (four) hours as needed for Shortness of Breath. Rescue    ciprofloxacin HCl (CIPRO) 500 MG tablet Take 1 tablet  "(500 mg total) by mouth every 12 (twelve) hours. for 12 days    citalopram (CELEXA) 10 MG tablet Take 10 mg by mouth once daily.    ergocalciferol (ERGOCALCIFEROL) 50,000 unit Cap Take 50,000 Units by mouth once a week.    gabapentin (NEURONTIN) 300 MG capsule Take 300 mg by mouth 3 (three) times daily.    HYDROcodone-acetaminophen (NORCO)  mg per tablet Take 1 tablet by mouth every 6 (six) hours as needed for Pain.    ondansetron (ZOFRAN-ODT) 4 MG TbDL Take 1 tablet (4 mg total) by mouth every 6 (six) hours as needed (Nausea).    oxybutynin (DITROPAN) 5 MG Tab Take 1 tablet (5 mg total) by mouth 3 (three) times daily as needed (bladder spasms).    pantoprazole (PROTONIX) 20 MG tablet Take 1 tablet (20 mg total) by mouth once daily.    triamterene-hydrochlorothiazide 37.5-25 mg (DYAZIDE) 37.5-25 mg per capsule Take 1 capsule by mouth once daily.     No current facility-administered medications for this visit.        Review of Systems   Constitutional: Negative for activity change, chills, fatigue, fever and unexpected weight change.   Eyes: Negative for discharge, redness and visual disturbance.   Respiratory: Negative for cough, shortness of breath and wheezing.    Cardiovascular: Negative for chest pain and leg swelling.   Gastrointestinal: Negative for abdominal distention, abdominal pain, constipation, diarrhea, nausea and vomiting.   Genitourinary: Negative for decreased urine volume, difficulty urinating, dysuria, frequency, hematuria, pelvic pain, urgency, vaginal bleeding, vaginal discharge and vaginal pain.   Musculoskeletal: Negative for arthralgias, joint swelling and myalgias.   Skin: Negative for color change and rash.   Neurological: Negative for dizziness and light-headedness.   Psychiatric/Behavioral: Negative for behavioral problems and confusion. The patient is not nervous/anxious.        Objective:      Vitals:    07/27/20 1150   BP: 131/80   Weight: 68 kg (150 lb)   Height: 5' 6" " (1.676 m)        Physical Exam   Constitutional: She is oriented to person, place, and time. She appears well-developed.   HENT:   Head: Normocephalic and atraumatic.   Nose: Nose normal.   Eyes: Conjunctivae are normal. Right eye exhibits no discharge. Left eye exhibits no discharge.   Neck: Normal range of motion. Neck supple. No tracheal deviation present. No thyromegaly present.   Cardiovascular: Normal rate and regular rhythm.    Pulmonary/Chest: Effort normal. No respiratory distress. She has no wheezes.   Abdominal: Soft. She exhibits no distension. There is no abdominal tenderness. No hernia.   Genitourinary:    Genitourinary Comments: Patient declined exam     Musculoskeletal: Normal range of motion.   Neurological: She is alert and oriented to person, place, and time.   Skin: Skin is warm and dry. No rash noted. No erythema.     Psychiatric: Her behavior is normal. Judgment normal.       Urine dipstick shows ++LE, +Nitrite, +++protein, 250 RBCs.     Assessment:       1. Ureteral stone    2. E. coli UTI    3. Bacteremia due to Escherichia coli          Plan:       1. Ureteral stone  - 8 mm L proximal ureteral stone  -s/p stent placement on 7/20/20 by Dr Granados  -Plan for left ureteroscopy/LL/stent placement on Friday 7/31/20 with Dr Granados. Risks and benefits of procedure discussed with patient. Patient consented  - POCT urinalysis, dipstick or tablet reag  - Urine culture    2. E. coli UTI  -Continue Cipro  - Urine culture    3. Bacteremia due to Escherichia coli  -BCx--+ E coli  -Continue Cipro             Follow up for 2-3 weeks post op.

## 2020-07-29 ENCOUNTER — ANESTHESIA EVENT (OUTPATIENT)
Dept: SURGERY | Facility: HOSPITAL | Age: 55
End: 2020-07-29
Payer: COMMERCIAL

## 2020-07-29 ENCOUNTER — HOSPITAL ENCOUNTER (OUTPATIENT)
Dept: PREADMISSION TESTING | Facility: HOSPITAL | Age: 55
Discharge: HOME | End: 2020-07-29
Attending: UROLOGY
Payer: COMMERCIAL

## 2020-07-29 VITALS
WEIGHT: 176.38 LBS | BODY MASS INDEX: 28.34 KG/M2 | DIASTOLIC BLOOD PRESSURE: 80 MMHG | TEMPERATURE: 99 F | SYSTOLIC BLOOD PRESSURE: 120 MMHG | HEIGHT: 66 IN | OXYGEN SATURATION: 96 % | HEART RATE: 102 BPM | RESPIRATION RATE: 18 BRPM

## 2020-07-29 DIAGNOSIS — Z01.818 PREOP TESTING: ICD-10-CM

## 2020-07-29 LAB
BACTERIA UR CULT: NORMAL
SARS-COV-2 RDRP RESP QL NAA+PROBE: NEGATIVE

## 2020-07-29 PROCEDURE — U0002 COVID-19 LAB TEST NON-CDC: HCPCS

## 2020-07-29 RX ORDER — HYDROCODONE BITARTRATE AND ACETAMINOPHEN 10; 325 MG/1; MG/1
1 TABLET ORAL EVERY 6 HOURS PRN
Qty: 20 TABLET | Refills: 0 | Status: SHIPPED | OUTPATIENT
Start: 2020-07-29 | End: 2023-12-29

## 2020-07-29 RX ORDER — OXYBUTYNIN CHLORIDE 5 MG/1
5 TABLET ORAL 3 TIMES DAILY PRN
Qty: 30 TABLET | Refills: 0 | Status: SHIPPED | OUTPATIENT
Start: 2020-07-29 | End: 2021-07-29

## 2020-07-29 NOTE — DISCHARGE INSTRUCTIONS
Your procedure  is scheduled for _7/31/2020_________.    Call [phone] between 2pm and 5pm on __7/30/2020_____to find out your arrival time for the day of surgery.    Report to the Emergency Department on the day of your surgery.  You will be escorted to the admitting unit.    Important instructions:   Do not eat or drink after 12 midnight, including water.  It is okay to brush your teeth.  Do not have gum, candy or mints.     Take only these medications with a small swallow of water on the morning of your surgery _   _albuterol, gabapentin, hydrocodone, oxybutinin if needed____________      Stop taking Aspirin, Ibuprofen, Motrin and Aleve , Fish oil, and Vitamin E for at least 7 days before your surgery. You may use Tylenol unless otherwise instructed by your doctor.         Please shower the night before and the morning of your surgery.       Do not wear make- up, including mascara.     You may wear deodorant only.      Do not wear powder, body lotion or perfume/cologne.     Do not wear any jewelry or have any metal on your body.     You will be asked to remove any dentures or partials for the procedure.     Please bring any documents given to you by your doctor.     If you are going home on the same day of surgery, you must arrange for a family member or a friend to drive you home.  Public transportation is prohibited.  You will not be able to drive home if you were given anesthesia or sedation.     Wear loose fitting clothes allowing for bandages.     Please leave money and valuables home.       You may bring your cell phone.     Call the doctor if fever or illness should occur before your surgery.    Call [phone] to contact us here if needed.

## 2020-07-31 ENCOUNTER — ANESTHESIA (OUTPATIENT)
Dept: SURGERY | Facility: HOSPITAL | Age: 55
End: 2020-07-31
Payer: COMMERCIAL

## 2020-07-31 ENCOUNTER — HOSPITAL ENCOUNTER (OUTPATIENT)
Facility: HOSPITAL | Age: 55
Discharge: HOME | End: 2020-07-31
Attending: UROLOGY | Admitting: UROLOGY
Payer: COMMERCIAL

## 2020-07-31 VITALS
DIASTOLIC BLOOD PRESSURE: 58 MMHG | TEMPERATURE: 98 F | SYSTOLIC BLOOD PRESSURE: 118 MMHG | RESPIRATION RATE: 16 BRPM | HEART RATE: 79 BPM | WEIGHT: 176.38 LBS | OXYGEN SATURATION: 95 % | BODY MASS INDEX: 28.47 KG/M2

## 2020-07-31 DIAGNOSIS — Z01.818 PREOP TESTING: ICD-10-CM

## 2020-07-31 DIAGNOSIS — N20.1 LEFT URETERAL CALCULUS: Primary | ICD-10-CM

## 2020-07-31 DIAGNOSIS — Z87.442 HISTORY OF NEPHROLITHIASIS: ICD-10-CM

## 2020-07-31 LAB
FINAL PATHOLOGIC DIAGNOSIS: NORMAL
GROSS: NORMAL

## 2020-07-31 PROCEDURE — D9220A PRA ANESTHESIA: ICD-10-PCS | Mod: ANES,,, | Performed by: ANESTHESIOLOGY

## 2020-07-31 PROCEDURE — 37000009 HC ANESTHESIA EA ADD 15 MINS: Performed by: UROLOGY

## 2020-07-31 PROCEDURE — 71000015 HC POSTOP RECOV 1ST HR: Performed by: UROLOGY

## 2020-07-31 PROCEDURE — C1769 GUIDE WIRE: HCPCS | Performed by: UROLOGY

## 2020-07-31 PROCEDURE — 37000008 HC ANESTHESIA 1ST 15 MINUTES: Performed by: UROLOGY

## 2020-07-31 PROCEDURE — D9220A PRA ANESTHESIA: Mod: CRNA,,, | Performed by: NURSE ANESTHETIST, CERTIFIED REGISTERED

## 2020-07-31 PROCEDURE — 63600175 PHARM REV CODE 636 W HCPCS: Performed by: UROLOGY

## 2020-07-31 PROCEDURE — D9220A PRA ANESTHESIA: ICD-10-PCS | Mod: CRNA,,, | Performed by: NURSE ANESTHETIST, CERTIFIED REGISTERED

## 2020-07-31 PROCEDURE — 36000707: Performed by: UROLOGY

## 2020-07-31 PROCEDURE — 63600175 PHARM REV CODE 636 W HCPCS: Performed by: ANESTHESIOLOGY

## 2020-07-31 PROCEDURE — C1894 INTRO/SHEATH, NON-LASER: HCPCS | Performed by: UROLOGY

## 2020-07-31 PROCEDURE — 88300 SURGICAL PATH GROSS: CPT | Performed by: PATHOLOGY

## 2020-07-31 PROCEDURE — 82365 CALCULUS SPECTROSCOPY: CPT

## 2020-07-31 PROCEDURE — 88300 PR  SURG PATH,GROSS,LEVEL I: ICD-10-PCS | Mod: 26,,, | Performed by: PATHOLOGY

## 2020-07-31 PROCEDURE — D9220A PRA ANESTHESIA: Mod: ANES,,, | Performed by: ANESTHESIOLOGY

## 2020-07-31 PROCEDURE — C1773 RET DEV, INSERTABLE: HCPCS | Performed by: UROLOGY

## 2020-07-31 PROCEDURE — 27201423 OPTIME MED/SURG SUP & DEVICES STERILE SUPPLY: Performed by: UROLOGY

## 2020-07-31 PROCEDURE — 25500020 PHARM REV CODE 255: Performed by: UROLOGY

## 2020-07-31 PROCEDURE — 25000003 PHARM REV CODE 250: Performed by: UROLOGY

## 2020-07-31 PROCEDURE — 88300 SURGICAL PATH GROSS: CPT | Mod: 26,,, | Performed by: PATHOLOGY

## 2020-07-31 PROCEDURE — 52356 PR CYSTO/URETERO W/LITHOTRIPSY: ICD-10-PCS | Mod: LT,,, | Performed by: UROLOGY

## 2020-07-31 PROCEDURE — 71000016 HC POSTOP RECOV ADDL HR: Performed by: UROLOGY

## 2020-07-31 PROCEDURE — 63600175 PHARM REV CODE 636 W HCPCS: Performed by: NURSE ANESTHETIST, CERTIFIED REGISTERED

## 2020-07-31 PROCEDURE — 36000706: Performed by: UROLOGY

## 2020-07-31 PROCEDURE — C2617 STENT, NON-COR, TEM W/O DEL: HCPCS | Performed by: UROLOGY

## 2020-07-31 PROCEDURE — 71000039 HC RECOVERY, EACH ADD'L HOUR: Performed by: UROLOGY

## 2020-07-31 PROCEDURE — 71000033 HC RECOVERY, INTIAL HOUR: Performed by: UROLOGY

## 2020-07-31 PROCEDURE — 52356 CYSTO/URETERO W/LITHOTRIPSY: CPT | Mod: LT,,, | Performed by: UROLOGY

## 2020-07-31 DEVICE — STENT URET PERCUFLEX 6FR 24CM: Type: IMPLANTABLE DEVICE | Site: URETER | Status: FUNCTIONAL

## 2020-07-31 RX ORDER — ACETAMINOPHEN 325 MG/1
650 TABLET ORAL EVERY 4 HOURS PRN
Status: CANCELLED | OUTPATIENT
Start: 2020-07-31

## 2020-07-31 RX ORDER — SODIUM CHLORIDE, SODIUM LACTATE, POTASSIUM CHLORIDE, CALCIUM CHLORIDE 600; 310; 30; 20 MG/100ML; MG/100ML; MG/100ML; MG/100ML
INJECTION, SOLUTION INTRAVENOUS CONTINUOUS
Status: DISCONTINUED | OUTPATIENT
Start: 2020-07-31 | End: 2020-07-31 | Stop reason: HOSPADM

## 2020-07-31 RX ORDER — FENTANYL CITRATE 50 UG/ML
INJECTION, SOLUTION INTRAMUSCULAR; INTRAVENOUS
Status: DISCONTINUED | OUTPATIENT
Start: 2020-07-31 | End: 2020-07-31

## 2020-07-31 RX ORDER — SUCCINYLCHOLINE CHLORIDE 20 MG/ML
INJECTION INTRAMUSCULAR; INTRAVENOUS
Status: DISCONTINUED | OUTPATIENT
Start: 2020-07-31 | End: 2020-07-31

## 2020-07-31 RX ORDER — PHENAZOPYRIDINE HYDROCHLORIDE 100 MG/1
200 TABLET, FILM COATED ORAL
Qty: 18 TABLET | Refills: 0 | OUTPATIENT
Start: 2020-07-31 | End: 2023-12-29

## 2020-07-31 RX ORDER — CIPROFLOXACIN 2 MG/ML
400 INJECTION, SOLUTION INTRAVENOUS
Status: COMPLETED | OUTPATIENT
Start: 2020-07-31 | End: 2020-07-31

## 2020-07-31 RX ORDER — ONDANSETRON 2 MG/ML
4 INJECTION INTRAMUSCULAR; INTRAVENOUS EVERY 12 HOURS PRN
Status: CANCELLED | OUTPATIENT
Start: 2020-07-31

## 2020-07-31 RX ORDER — HYDROCODONE BITARTRATE AND ACETAMINOPHEN 5; 325 MG/1; MG/1
1 TABLET ORAL EVERY 6 HOURS PRN
Qty: 6 TABLET | Refills: 0 | OUTPATIENT
Start: 2020-07-31 | End: 2023-12-29

## 2020-07-31 RX ORDER — SODIUM CHLORIDE 0.9 % (FLUSH) 0.9 %
10 SYRINGE (ML) INJECTION
Status: DISCONTINUED | OUTPATIENT
Start: 2020-07-31 | End: 2020-07-31 | Stop reason: HOSPADM

## 2020-07-31 RX ORDER — SODIUM CHLORIDE, SODIUM LACTATE, POTASSIUM CHLORIDE, CALCIUM CHLORIDE 600; 310; 30; 20 MG/100ML; MG/100ML; MG/100ML; MG/100ML
INJECTION, SOLUTION INTRAVENOUS CONTINUOUS PRN
Status: DISCONTINUED | OUTPATIENT
Start: 2020-07-31 | End: 2020-07-31

## 2020-07-31 RX ORDER — HYDROCODONE BITARTRATE AND ACETAMINOPHEN 5; 325 MG/1; MG/1
1 TABLET ORAL ONCE
Status: COMPLETED | OUTPATIENT
Start: 2020-07-31 | End: 2020-07-31

## 2020-07-31 RX ORDER — PHENAZOPYRIDINE HYDROCHLORIDE 100 MG/1
200 TABLET, FILM COATED ORAL 3 TIMES DAILY PRN
Status: DISCONTINUED | OUTPATIENT
Start: 2020-07-31 | End: 2020-07-31 | Stop reason: HOSPADM

## 2020-07-31 RX ORDER — LIDOCAINE HYDROCHLORIDE 20 MG/ML
INJECTION INTRAVENOUS
Status: DISCONTINUED | OUTPATIENT
Start: 2020-07-31 | End: 2020-07-31

## 2020-07-31 RX ORDER — KETOROLAC TROMETHAMINE 30 MG/ML
INJECTION, SOLUTION INTRAMUSCULAR; INTRAVENOUS
Status: DISCONTINUED | OUTPATIENT
Start: 2020-07-31 | End: 2020-07-31

## 2020-07-31 RX ORDER — HYDROCODONE BITARTRATE AND ACETAMINOPHEN 5; 325 MG/1; MG/1
1 TABLET ORAL EVERY 4 HOURS PRN
Status: CANCELLED | OUTPATIENT
Start: 2020-07-31

## 2020-07-31 RX ORDER — DEXAMETHASONE SODIUM PHOSPHATE 4 MG/ML
INJECTION, SOLUTION INTRA-ARTICULAR; INTRALESIONAL; INTRAMUSCULAR; INTRAVENOUS; SOFT TISSUE
Status: DISCONTINUED | OUTPATIENT
Start: 2020-07-31 | End: 2020-07-31

## 2020-07-31 RX ORDER — MIDAZOLAM HYDROCHLORIDE 1 MG/ML
INJECTION, SOLUTION INTRAMUSCULAR; INTRAVENOUS
Status: DISCONTINUED | OUTPATIENT
Start: 2020-07-31 | End: 2020-07-31

## 2020-07-31 RX ORDER — CIPROFLOXACIN 500 MG/1
500 TABLET ORAL 2 TIMES DAILY
Qty: 4 TABLET | Refills: 0 | Status: SHIPPED | OUTPATIENT
Start: 2020-07-31 | End: 2020-08-02

## 2020-07-31 RX ORDER — PROPOFOL 10 MG/ML
VIAL (ML) INTRAVENOUS
Status: DISCONTINUED | OUTPATIENT
Start: 2020-07-31 | End: 2020-07-31

## 2020-07-31 RX ORDER — HYDROMORPHONE HYDROCHLORIDE 2 MG/ML
0.2 INJECTION, SOLUTION INTRAMUSCULAR; INTRAVENOUS; SUBCUTANEOUS EVERY 5 MIN PRN
Status: DISCONTINUED | OUTPATIENT
Start: 2020-07-31 | End: 2020-07-31 | Stop reason: HOSPADM

## 2020-07-31 RX ORDER — ONDANSETRON 2 MG/ML
INJECTION INTRAMUSCULAR; INTRAVENOUS
Status: DISCONTINUED | OUTPATIENT
Start: 2020-07-31 | End: 2020-07-31

## 2020-07-31 RX ORDER — LIDOCAINE HYDROCHLORIDE 10 MG/ML
0.5 INJECTION, SOLUTION EPIDURAL; INFILTRATION; INTRACAUDAL; PERINEURAL ONCE
Status: DISCONTINUED | OUTPATIENT
Start: 2020-07-31 | End: 2020-07-31 | Stop reason: HOSPADM

## 2020-07-31 RX ADMIN — PHENAZOPYRIDINE HYDROCHLORIDE 200 MG: 100 TABLET ORAL at 12:07

## 2020-07-31 RX ADMIN — FENTANYL CITRATE 100 MCG: 50 INJECTION INTRAMUSCULAR; INTRAVENOUS at 11:07

## 2020-07-31 RX ADMIN — SUCCINYLCHOLINE CHLORIDE 120 MG: 20 INJECTION, SOLUTION INTRAMUSCULAR; INTRAVENOUS at 11:07

## 2020-07-31 RX ADMIN — Medication 100 MG: at 11:07

## 2020-07-31 RX ADMIN — PROPOFOL 200 MG: 10 INJECTION, EMULSION INTRAVENOUS at 11:07

## 2020-07-31 RX ADMIN — KETOROLAC TROMETHAMINE 30 MG: 30 INJECTION, SOLUTION INTRAMUSCULAR; INTRAVENOUS at 11:07

## 2020-07-31 RX ADMIN — CIPROFLOXACIN 400 MG: 2 INJECTION, SOLUTION INTRAVENOUS at 11:07

## 2020-07-31 RX ADMIN — HYDROMORPHONE HYDROCHLORIDE 0.2 MG: 2 INJECTION, SOLUTION INTRAMUSCULAR; INTRAVENOUS; SUBCUTANEOUS at 01:07

## 2020-07-31 RX ADMIN — DEXAMETHASONE SODIUM PHOSPHATE 8 MG: 4 INJECTION, SOLUTION INTRAMUSCULAR; INTRAVENOUS at 11:07

## 2020-07-31 RX ADMIN — ONDANSETRON 4 MG: 2 INJECTION, SOLUTION INTRAMUSCULAR; INTRAVENOUS at 11:07

## 2020-07-31 RX ADMIN — SODIUM CHLORIDE, SODIUM LACTATE, POTASSIUM CHLORIDE, AND CALCIUM CHLORIDE: .6; .31; .03; .02 INJECTION, SOLUTION INTRAVENOUS at 09:07

## 2020-07-31 RX ADMIN — SODIUM CHLORIDE, SODIUM LACTATE, POTASSIUM CHLORIDE, AND CALCIUM CHLORIDE: .6; .31; .03; .02 INJECTION, SOLUTION INTRAVENOUS at 10:07

## 2020-07-31 RX ADMIN — HYDROCODONE BITARTRATE AND ACETAMINOPHEN 1 TABLET: 5; 325 TABLET ORAL at 02:07

## 2020-07-31 RX ADMIN — MIDAZOLAM HYDROCHLORIDE 2 MG: 1 INJECTION, SOLUTION INTRAMUSCULAR; INTRAVENOUS at 10:07

## 2020-07-31 NOTE — DISCHARGE INSTRUCTIONS
Expect blood and/or burning with urination. Drink plenty of fluid to keep bladder flushed.    **Remove stent by removing tape and pulling string on the morning of Wednesday 08/05/2020. Do not cut string. Expect to see a long, thin tube with a curl on each end attached to the string. Call with issues or if stent does not come out.**        ACTIVITY LEVEL: If you have received sedation or an anesthetic, you may feel sleepy for several hours. Rest until you are more awake. Gradually resume your normal activities.       DIET: You may resume your home diet. If nausea is present, increase your diet gradually with fluids and bland foods.      Medications: Pain medication should be taken only if needed and as directed. If antibiotics are prescribed, the medication should be taken until completed. You will be given an updated list of you medications.  ? No driving, alcoholic beverages or signing legal documents for next 24 hours or while taking pain medication        CALL THE DOCTOR:       · Fever over 101°F  · Severe pain that doesnt go away with medication.  · Upset stomach and vomiting that is persistent.  · Problems urinating-unable to urinate or heavy bleeding (with or without clots)     Fall Prevention  Millions of people fall every year and injure themselves. You may have had anesthesia or sedation which may increase your risk of falling. You may have health issues that put you at an increased risk of falling.     Here are ways to reduce your risk of falling.  ·   · Make your home safe by keeping walkways clear of objects you may trip over.  · Use non-slip pads under rugs. Do not use area rugs or small throw rugs.  · Use non-slip mats in bathtubs and showers.  · Install handrails and lights on staircases.  · Do not walk in poorly lit areas.  · Do not stand on chairs or wobbly ladders.  · Use caution when reaching overhead or looking upward. This position can cause a loss of balance.  · Be sure your shoes fit  properly, have non-slip bottoms and are in good condition.   · Wear shoes both inside and out. Avoid going barefoot or wearing slippers.  · Be cautious when going up and down stairs, curbs, and when walking on uneven sidewalks.  · If your balance is poor, consider using a cane or walker.  · If your fall was related to alcohol use, stop or limit alcohol intake.   · If your fall was related to use of sleeping medicines, talk to your doctor about this. You may need to reduce your dosage at bedtime if you awaken during the night to go to the bathroom.    · To reduce the need for nighttime bathroom trips:  ¨ Avoid drinking fluids for several hours before going to bed  ¨ Empty your bladder before going to bed  ¨ Men can keep a urinal at the bedside  · Stay as active as you can. Balance, flexibility, strength, and endurance all come from exercise. They all play a role in preventing falls. Ask your healthcare provider which types of activity are right for you.  · Get your vision checked on a regular basis.  · If you have pets, know where they are before you stand up or walk so you don't trip over them.  Use night lights.

## 2020-07-31 NOTE — ANESTHESIA PROCEDURE NOTES
Intubation  Performed by: Olga Escobedo CRNA  Authorized by: Geno Diaz MD     Intubation:     Induction:  Intravenous    Intubated:  Postinduction    Mask Ventilation:  Easy with oral airway    Attempts:  2    Attempted By:  Student (CELESTE Altman)    Method of Intubation:  Direct    Blade:  Lenny 3    Laryngeal View Grade: Grade IIA - cords partially seen      Attempted By (2nd Attempt):  Staff anesthesiologist    Method of Intubation (2nd Attempt):  Direct    Blade (2nd Attempt):  Lenny 3    Laryngeal View Grade (2nd Attempt): Grade I - full view of cords      Difficult Airway Encountered?: No      Complications:  None    Airway Device:  Oral endotracheal tube    Airway Device Size:  7.0    Style/Cuff Inflation:  Cuffed    Tube secured:  21    Secured at:  The lips    Placement Verified By:  Capnometry    Complicating Factors:  Anterior larynx, retrognathia, small mouth and oropharyngeal edema or fat    Findings Post-Intubation:  BS equal bilateral

## 2020-07-31 NOTE — TRANSFER OF CARE
Anesthesia Transfer of Care Note    Patient: Myra Harrison    Procedure(s) Performed: Procedure(s) (LRB):  Cystoscopy, possible retrograde pyelogram, ureteroscopy with laser lithotripsy, placement of ureteral stent (Left)    Patient location: PACU    Anesthesia Type: general    Transport from OR: Transported from OR on room air with adequate spontaneous ventilation    Post pain: adequate analgesia    Post assessment: no apparent anesthetic complications and tolerated procedure well    Post vital signs: stable    Level of consciousness: sedated    Nausea/Vomiting: no nausea/vomiting    Complications: none    Transfer of care protocol was followed      Last vitals:   Visit Vitals  BP (!) 160/77 (BP Location: Left arm, Patient Position: Lying)   Pulse 87   Temp 37 °C (98.6 °F) (Oral)   Resp 10   Wt 80 kg (176 lb 5.9 oz)   SpO2 99%   Breastfeeding No   BMI 28.47 kg/m²

## 2020-07-31 NOTE — OP NOTE
[facility]  Surgery Department  Urology Operative Note    SUMMARY     Date of Procedure: 7/31/2020     Surgeon(s) and Role:     * Yessi Granados MD - Primary    Assisting Surgeon: None    Pre-Operative Diagnosis: History of nephrolithiasis [Z87.442]    Post-Operative Diagnosis: Post-Op Diagnosis Codes:     * History of nephrolithiasis [Z87.442]    Procedure: Procedure(s) (LRB):  Cystoscopy, possible retrograde pyelogram, ureteroscopy with laser lithotripsy, placement of ureteral stent (Left)    Anesthesia: Choice    Indication for Procedure: 54yo F previously admitted for bacteremia and found to have a 8 mm left proximal ureteral stone.  She underwent stent placement at that time.  She presents today for definitive treatment of the stone.    Description of Procedure: The patient was brought to operating room and placed under general anesthesia. Full time out procedures were performed identifying correct patient, procedure and laterality. Appropriate antibiotics with Cipro were given prior to commencement of surgery. The patient was placed in dorsal lithotomy position and prepped and draped in the usual sterile fashion.    A 22Fr rigid cystoscopy was placed per urethral and passed into the bladder. Cystoscopy did not reveal any abnormality of the bladder.  Previously placed stent was noted to be emanating from the left ureteral orifice.   film was obtained, which confirmed the location of the stone in the left proximal ureter.  Stent was grasped and brought out to the urethral meatus.  A Sensor wire was passed through the stent up to the level of the kidney as confirmed on fluoroscopy.  A 2nd wire was then placed to serve as a safety wire.      A 12/14 Fr ureteral access sheath was then placed over the wire and into the proximal ureter under fluoroscopic guidance. Flexible ureteroscopy was then passed into the ureteral access sheath to the level of the stone. The stone was identified  within the proximal ureter. Laser lithotripsy was then performed using the Holmium laser and the stone was fragmented into multiple pieces. Any large, potentially obstructing fragments were extracted using the Zero-tipped Nitinol basket. These fragments were collected and submitted as a specimen.    The entire renal pelvis was then inspected and all calices were noted to be void of any large stone fragments. The ureteral access  sheath was removed under direct vision with no injuries or additional stones identified. Under fluoroscopic guidance, the guidewire was then exchanged for a 6x24 double-J ureteral stent. Good coils were confirmed within the renal pelvis and the bladder using fluoroscopy and cytoscopy. The string was not removed from the stent prior to placement. The bladder was then emptied and the cystoscope removed. The patient was then awakened and transferred to PACU in stable condition.     She will remove the stent at home Wednesday 08/05/2020 and follow-up with me 2-3 weeks.    Findings:  8 mm radiopaque stone in the left proximal ureter, fragmented and removed.  Stent on string placed.    Complications: No    Estimated Blood Loss (EBL):  5 cc    Drains:  6 [place] by 24 cm double-J ureteral stent on the left ureter on string           Implants:   Implant Name Type Inv. Item Serial No.  Lot No. LRB No. Used Action   STENT URET PERCUFLEX 6FR 24CM - [id number]  STENT URET PERCUFLEX 6FR 24CM  Humansized  Left 1 Implanted       Specimens:  Left ureteral stent, left ureteral stone  Specimen (12h ago, onward)    None                  Condition: Good    Disposition: PACU - hemodynamically stable.    Attestation: I was present and scrubbed for the entire procedure.    Discharge Note    SUMMARY     Admit Date: 7/31/2020    Discharge Date and Time:  07/31/2020 12:16 PM    Hospital Course (synopsis of major diagnoses, care, treatment, and services provided during the course of the hospital  stay): Uncomplicated URS/LL/stent.      Final Diagnosis: Post-Op Diagnosis Codes:     * History of nephrolithiasis [Z87.442]    Disposition: Home or Self Care    Follow Up/Patient Instructions:     Medications:  Reconciled Home Medications:      Medication List      START taking these medications    phenazopyridine 100 MG tablet  Commonly known as: PYRIDIUM  Take 2 tablets (200 mg total) by mouth 3 (three) times daily with meals.        CHANGE how you take these medications    * ciprofloxacin HCl 500 MG tablet  Commonly known as: CIPRO  Take 1 tablet (500 mg total) by mouth every 12 (twelve) hours. for 12 days  What changed: Another medication with the same name was added. Make sure you understand how and when to take each.     * ciprofloxacin HCl 500 MG tablet  Commonly known as: CIPRO  Take 1 tablet (500 mg total) by mouth 2 (two) times daily. for 2 days  What changed: You were already taking a medication with the same name, and this prescription was added. Make sure you understand how and when to take each.     * HYDROcodone-acetaminophen  mg per tablet  Commonly known as: NORCO  Take 1 tablet by mouth every 6 (six) hours as needed for Pain.  What changed: Another medication with the same name was added. Make sure you understand how and when to take each.     * HYDROcodone-acetaminophen 5-325 mg per tablet  Commonly known as: NORCO  Take 1 tablet by mouth every 6 (six) hours as needed for Pain.  What changed: You were already taking a medication with the same name, and this prescription was added. Make sure you understand how and when to take each.         * This list has 4 medication(s) that are the same as other medications prescribed for you. Read the directions carefully, and ask your doctor or other care provider to review them with you.            CONTINUE taking these medications    albuterol 90 mcg/actuation inhaler  Commonly known as: PROVENTIL/VENTOLIN HFA  Inhale 1-2 puffs into the lungs every 4  (four) hours as needed for Shortness of Breath. Rescue     citalopram 10 MG tablet  Commonly known as: CELEXA  Take 10 mg by mouth once daily.     ergocalciferol 50,000 unit Cap  Commonly known as: ERGOCALCIFEROL  Take 50,000 Units by mouth once a week.     gabapentin 300 MG capsule  Commonly known as: NEURONTIN  Take 300 mg by mouth 3 (three) times daily.     ondansetron 4 MG Tbdl  Commonly known as: ZOFRAN-ODT  Take 1 tablet (4 mg total) by mouth every 6 (six) hours as needed (Nausea).     oxybutynin 5 MG Tab  Commonly known as: DITROPAN  Take 1 tablet (5 mg total) by mouth 3 (three) times daily as needed (bladder spasms).     pantoprazole 20 MG tablet  Commonly known as: PROTONIX  Take 1 tablet (20 mg total) by mouth once daily.     triamterene-hydrochlorothiazide 37.5-25 mg 37.5-25 mg per capsule  Commonly known as: DYAZIDE  Take 1 capsule by mouth once daily.          Discharge Procedure Orders   Diet general     Call MD for:  temperature >100.4     Call MD for:  persistent nausea and vomiting     Call MD for:  severe uncontrolled pain     Call MD for:  difficulty breathing, headache or visual disturbances     No dressing needed     Activity as tolerated     Follow-up Information     Yessi Granados MD In 3 weeks.    Specialty: Urology  Why: For post-op follow up  Contact information:  [address]  [address]  [facility] [id number] [phone]

## 2020-07-31 NOTE — INTERVAL H&P NOTE
The patient has been examined and the H&P has been reviewed:    I concur with the findings and no changes have occurred since H&P was written.    Surgery risks, benefits and alternative options discussed and understood by patient/family.          Active Hospital Problems    Diagnosis  POA    Left ureteral calculus [N20.1]  Yes      Resolved Hospital Problems   No resolved problems to display.

## 2020-08-03 NOTE — ANESTHESIA POSTPROCEDURE EVALUATION
Anesthesia Post Evaluation    Patient: Myra Harrison    Procedure(s) Performed: Procedure(s) (LRB):  Cystoscopy, possible retrograde pyelogram, ureteroscopy with laser lithotripsy, placement of ureteral stent (Left)    Final Anesthesia Type: general    Patient location during evaluation: PACU  Patient participation: Yes- Able to Participate  Level of consciousness: awake and alert, oriented and awake  Post-procedure vital signs: reviewed and stable  Airway patency: patent    PONV status at discharge: No PONV  Anesthetic complications: no      Cardiovascular status: blood pressure returned to baseline  Respiratory status: unassisted, spontaneous ventilation and room air  Hydration status: euvolemic  Follow-up not needed.          Vitals Value Taken Time   /58 07/31/20 1520   Temp 36.6 °C (97.8 °F) 07/31/20 1520   Pulse 79 07/31/20 1520   Resp 16 07/31/20 1520   SpO2 95 % 07/31/20 1520         Event Time   Out of Recovery 13:33:00         Pain/Volodymyr Score: No data recorded

## 2020-08-10 LAB
COMPN STONE: NORMAL
SPECIMEN SOURCE: NORMAL
STONE ANALYSIS IR-IMP: NORMAL

## 2020-08-22 NOTE — ANESTHESIA PREPROCEDURE EVALUATION
07/31/2020  Myra Harrison is a 55 y.o., female.    Pre-op Assessment     I have reviewed the Nursing Notes.       Review of Systems  Anesthesia Hx:  No problems with previous Anesthesia    Social:  Non-Smoker    Cardiovascular:   Denies Pacemaker. Hypertension  Denies Valvular problems/Murmurs.  Denies MI.  Denies CAD.    Denies CABG/stent.  Denies Dysrhythmias.   Denies Angina.             denies PVD no hyperlipidemia    Pulmonary:   Denies Pneumonia Asthma    Renal/:   renal calculi    Hepatic/GI:   No Bowel Prep. GERD Denies Liver Disease. Denies Hepatitis.    Neurological:   Headaches Seizures   Chronic Pain Syndrome   Endocrine:  Endocrine Normal    Psych:   Psychiatric History          Physical Exam  General:  Well nourished    Airway/Jaw/Neck:  AIRWAY FINDINGS: Normal      Chest/Lungs:  Chest/Lungs Clear    Heart/Vascular:  Heart Findings: Normal       Mental Status:  Mental Status Findings: Normal        Anesthesia Plan  Type of Anesthesia, risks & benefits discussed:  Anesthesia Type:  general  Patient's Preference:   Intra-op Monitoring Plan: standard ASA monitors  Intra-op Monitoring Plan Comments:   Post Op Pain Control Plan:   Post Op Pain Control Plan Comments:   Induction:   IV  Beta Blocker:  Patient is not currently on a Beta-Blocker (No further documentation required).       Informed Consent: Patient understands risks and agrees with Anesthesia plan.  Questions answered. Anesthesia consent signed with patient.  ASA Score: 2     Day of Surgery Review of History & Physical:  There are no significant changes.  H&P update referred to the provider.         Ready For Surgery From Anesthesia Perspective.        No

## 2021-02-24 ENCOUNTER — IMMUNIZATION (OUTPATIENT)
Dept: OBSTETRICS AND GYNECOLOGY | Facility: CLINIC | Age: 56
End: 2021-02-24
Payer: COMMERCIAL

## 2021-02-24 DIAGNOSIS — Z23 NEED FOR VACCINATION: Primary | ICD-10-CM

## 2021-02-24 PROCEDURE — 91300 COVID-19, MRNA, LNP-S, PF, 30 MCG/0.3 ML DOSE VACCINE: CPT | Mod: PBBFAC | Performed by: FAMILY MEDICINE

## 2021-03-17 ENCOUNTER — IMMUNIZATION (OUTPATIENT)
Dept: OBSTETRICS AND GYNECOLOGY | Facility: CLINIC | Age: 56
End: 2021-03-17
Payer: COMMERCIAL

## 2021-03-17 DIAGNOSIS — Z23 NEED FOR VACCINATION: Primary | ICD-10-CM

## 2021-03-17 PROCEDURE — 0002A COVID-19, MRNA, LNP-S, PF, 30 MCG/0.3 ML DOSE VACCINE: CPT | Mod: PBBFAC | Performed by: FAMILY MEDICINE

## 2021-03-17 PROCEDURE — 91300 COVID-19, MRNA, LNP-S, PF, 30 MCG/0.3 ML DOSE VACCINE: CPT | Mod: PBBFAC | Performed by: FAMILY MEDICINE

## 2021-07-01 ENCOUNTER — PATIENT MESSAGE (OUTPATIENT)
Dept: ADMINISTRATIVE | Facility: OTHER | Age: 56
End: 2021-07-01

## 2022-04-29 NOTE — ED NOTES
Attempted to call report to the floor nurse. MELISSA Jackson states that MELISSA Melton will be the patient's nurse and that she will have to call back to get report.    Mother states, my son has a scrotal mass that has to be excised.

## 2023-01-15 NOTE — TRANSFER OF CARE
"Anesthesia Transfer of Care Note    Patient: Myra Harrison    Procedure(s) Performed: Procedure(s) (LRB):  CYSTOSCOPY, WITH URETERAL STENT INSERTION (Left)    Patient location: PACU (pt recovered in OR due to possible COVID)    Anesthesia Type: general    Transport from OR: Transported from OR on room air with adequate spontaneous ventilation    Post pain: adequate analgesia    Post assessment: no apparent anesthetic complications and tolerated procedure well    Post vital signs: stable    Level of consciousness: sedated    Nausea/Vomiting: no nausea/vomiting    Complications: none    Transfer of care protocol was followed      Last vitals:   Visit Vitals  BP (!) 118/58 (BP Location: Right arm, Patient Position: Lying)   Pulse 86   Temp 36.5 °C (97.7 °F) (Oral)   Resp 16   Ht 5' 6" (1.676 m)   Wt 68.3 kg (150 lb 9.9 oz)   LMP  (Approximate)   SpO2 98%   Breastfeeding No   BMI 24.31 kg/m²     "
Yes

## 2023-11-15 ENCOUNTER — HOSPITAL ENCOUNTER (EMERGENCY)
Facility: HOSPITAL | Age: 58
Discharge: HOME | End: 2023-11-15
Attending: EMERGENCY MEDICINE
Payer: COMMERCIAL

## 2023-11-15 VITALS
WEIGHT: 170 LBS | OXYGEN SATURATION: 96 % | BODY MASS INDEX: 29.02 KG/M2 | SYSTOLIC BLOOD PRESSURE: 152 MMHG | RESPIRATION RATE: 18 BRPM | DIASTOLIC BLOOD PRESSURE: 65 MMHG | TEMPERATURE: 98 F | HEART RATE: 73 BPM | HEIGHT: 64 IN

## 2023-11-15 DIAGNOSIS — M79.606 LEG PAIN: ICD-10-CM

## 2023-11-15 DIAGNOSIS — I82.512 CHRONIC DEEP VEIN THROMBOSIS (DVT) OF FEMORAL VEIN OF LEFT LOWER EXTREMITY: Primary | ICD-10-CM

## 2023-11-15 DIAGNOSIS — M79.605 LEFT LEG PAIN: ICD-10-CM

## 2023-11-15 LAB
ALBUMIN SERPL BCP-MCNC: 4.2 G/DL (ref 3.5–5.2)
ALP SERPL-CCNC: 79 U/L (ref 55–135)
ALT SERPL W/O P-5'-P-CCNC: 13 U/L (ref 10–44)
ANION GAP SERPL CALC-SCNC: 8 MMOL/L (ref 8–16)
AST SERPL-CCNC: 13 U/L (ref 10–40)
BASOPHILS # BLD AUTO: 0.05 K/UL (ref 0–0.2)
BASOPHILS NFR BLD: 0.4 % (ref 0–1.9)
BILIRUB SERPL-MCNC: 0.5 MG/DL (ref 0.1–1)
BNP SERPL-MCNC: 19 PG/ML (ref 0–99)
BUN SERPL-MCNC: 19 MG/DL (ref 6–20)
CALCIUM SERPL-MCNC: 10.2 MG/DL (ref 8.7–10.5)
CHLORIDE SERPL-SCNC: 109 MMOL/L (ref 95–110)
CO2 SERPL-SCNC: 23 MMOL/L (ref 23–29)
CREAT SERPL-MCNC: 0.9 MG/DL (ref 0.5–1.4)
D DIMER PPP IA.FEU-MCNC: 0.3 MG/L FEU
DIFFERENTIAL METHOD: ABNORMAL
EOSINOPHIL # BLD AUTO: 0.1 K/UL (ref 0–0.5)
EOSINOPHIL NFR BLD: 1.1 % (ref 0–8)
ERYTHROCYTE [DISTWIDTH] IN BLOOD BY AUTOMATED COUNT: 12.7 % (ref 11.5–14.5)
EST. GFR  (NO RACE VARIABLE): >60 ML/MIN/1.73 M^2
GLUCOSE SERPL-MCNC: 110 MG/DL (ref 70–110)
HCT VFR BLD AUTO: 45.3 % (ref 37–48.5)
HGB BLD-MCNC: 15.3 G/DL (ref 12–16)
IMM GRANULOCYTES # BLD AUTO: 0.04 K/UL (ref 0–0.04)
IMM GRANULOCYTES NFR BLD AUTO: 0.3 % (ref 0–0.5)
LYMPHOCYTES # BLD AUTO: 3 K/UL (ref 1–4.8)
LYMPHOCYTES NFR BLD: 26 % (ref 18–48)
MCH RBC QN AUTO: 29.1 PG (ref 27–31)
MCHC RBC AUTO-ENTMCNC: 33.8 G/DL (ref 32–36)
MCV RBC AUTO: 86 FL (ref 82–98)
MONOCYTES # BLD AUTO: 0.6 K/UL (ref 0.3–1)
MONOCYTES NFR BLD: 5 % (ref 4–15)
NEUTROPHILS # BLD AUTO: 7.9 K/UL (ref 1.8–7.7)
NEUTROPHILS NFR BLD: 67.2 % (ref 38–73)
NRBC BLD-RTO: 0 /100 WBC
PLATELET # BLD AUTO: 291 K/UL (ref 150–450)
PMV BLD AUTO: 10.3 FL (ref 9.2–12.9)
POTASSIUM SERPL-SCNC: 4 MMOL/L (ref 3.5–5.1)
PROT SERPL-MCNC: 7.4 G/DL (ref 6–8.4)
RBC # BLD AUTO: 5.25 M/UL (ref 4–5.4)
SODIUM SERPL-SCNC: 140 MMOL/L (ref 136–145)
TROPONIN I SERPL DL<=0.01 NG/ML-MCNC: <0.006 NG/ML (ref 0–0.03)
WBC # BLD AUTO: 11.7 K/UL (ref 3.9–12.7)

## 2023-11-15 PROCEDURE — 85025 COMPLETE CBC W/AUTO DIFF WBC: CPT | Performed by: EMERGENCY MEDICINE

## 2023-11-15 PROCEDURE — 80053 COMPREHEN METABOLIC PANEL: CPT | Performed by: EMERGENCY MEDICINE

## 2023-11-15 PROCEDURE — 84484 ASSAY OF TROPONIN QUANT: CPT | Performed by: EMERGENCY MEDICINE

## 2023-11-15 PROCEDURE — 25000003 PHARM REV CODE 250: Performed by: EMERGENCY MEDICINE

## 2023-11-15 PROCEDURE — 83880 ASSAY OF NATRIURETIC PEPTIDE: CPT | Performed by: EMERGENCY MEDICINE

## 2023-11-15 PROCEDURE — 25500020 PHARM REV CODE 255: Performed by: EMERGENCY MEDICINE

## 2023-11-15 PROCEDURE — 85379 FIBRIN DEGRADATION QUANT: CPT | Performed by: EMERGENCY MEDICINE

## 2023-11-15 PROCEDURE — 99285 EMERGENCY DEPT VISIT HI MDM: CPT | Mod: 25

## 2023-11-15 RX ORDER — APIXABAN 5 MG (74)
KIT ORAL
Qty: 74 EACH | Refills: 1 | Status: SHIPPED | OUTPATIENT
Start: 2023-11-15 | End: 2023-11-30

## 2023-11-15 RX ORDER — APIXABAN 5 MG (74)
KIT ORAL
Qty: 1 TABLET | Refills: 1 | Status: SHIPPED | OUTPATIENT
Start: 2023-11-15 | End: 2023-11-15 | Stop reason: SDUPTHER

## 2023-11-15 RX ADMIN — APIXABAN 10 MG: 5 TABLET, FILM COATED ORAL at 06:11

## 2023-11-15 RX ADMIN — IOHEXOL 85 ML: 350 INJECTION, SOLUTION INTRAVENOUS at 05:11

## 2023-11-15 NOTE — ED PROVIDER NOTES
Encounter Date: 11/15/2023    SCRIBE #1 NOTE: I, Randa Steward, am scribing for, and in the presence of,  Sandoval Parra MD. I have scribed the following portions of the note - Other sections scribed: HPI, ROS.       History     Chief Complaint   Patient presents with    Deep Vein Thrombosis     Pt reports she tested positive for a possible blood clot to the left leg. Pt denies use of blood thinners.        This is a 58 year old female, with a PMHx of Asthma, Fibromyalgia, HTN, and Seizures who presents to the ED complaining of Deep Vein Thrombosis, symptoms onset two weeks ago.     Patient reports bilateral leg swelling, bilateral leg pain, and chronic SOB worse than usual. Patient states she had an Ultrasound done today for her left leg where she tested positive for a blood clot; stating her current pain is similar to past blood clots. Patient also states she had a knee replacement surgery one year ago and a few months later she had a blood clot. Patient further states she had an epidural injection last Friday. Patient notes that she was experiencing bilateral leg pain prior to her procedure on Friday. Patient also notes that she is unable to lay down flat. Patient further notes Hx of COPD and Migraines. No other alleviating or exacerbating factors. Patient denies recent flights. Patient also denies being on blood thinners since a few months ago. Patient further denies chest pain, chest pressure, chest tightness, or other associated symptoms. Patient did not attempt treatment/medications pta. This is the extent of the patient's complaints in the ED.         The history is provided by the patient. No  was used.     Review of patient's allergies indicates:   Allergen Reactions    Penicillins Itching     Past Medical History:   Diagnosis Date    Asthma     Brain mass 2007    Depression     Fibromyalgia     Hypertension     Seizures 2007    fell and hit head at grocery        Past Surgical  History:   Procedure Laterality Date    CARPAL TUNNEL RELEASE      right     SECTION      x 3    cysto/ureteroscopy stent placement      CYSTOSCOPY W/ URETERAL STENT PLACEMENT Left 2020    Procedure: CYSTOSCOPY, WITH URETERAL STENT INSERTION;  Surgeon: Yessi Granados MD;  Location: [facility] OR;  Service: Urology;  Laterality: Left;    HYSTERECTOMY      RETROGRADE PYELOGRAPHY N/A 2020    Procedure: PYELOGRAM, RETROGRADE;  Surgeon: Yessi Granados MD;  Location: [facility] OR;  Service: Urology;  Laterality: N/A;    TONSILLECTOMY      URETEROSCOPIC REMOVAL OF URETERIC CALCULUS Left 2020    Procedure: Cystoscopy, possible retrograde pyelogram, ureteroscopy with laser lithotripsy, placement of ureteral stent;  Surgeon: Yessi Granados MD;  Location: [facility] OR;  Service: Urology;  Laterality: Left;  RN PREOP 2020---COVID NEGATIVE ON      Family History   Problem Relation Age of Onset    Cancer Mother         Breast cancer    Heart disease Mother     Diabetes Father     Cancer Maternal Grandmother         Lung Cancer    Cancer Maternal Grandfather         Lung Cancer    Cancer Paternal Grandmother     Cancer Paternal Grandfather      Social History     Tobacco Use    Smoking status: Never    Smokeless tobacco: Never   Substance Use Topics    Alcohol use: Yes     Comment: occasionally    Drug use: No     Review of Systems   Constitutional: Negative.    HENT: Negative.     Eyes: Negative.    Respiratory:  Positive for shortness of breath (chronic). Negative for chest tightness.         (-) Chest pressure    Cardiovascular:  Positive for leg swelling (bilateral). Negative for chest pain.   Gastrointestinal: Negative.    Genitourinary: Negative.    Musculoskeletal:         (+) Bilateral Leg Pain    Skin: Negative.    Neurological: Negative.        Physical Exam     Initial Vitals [11/15/23 1637]   BP Pulse Resp Temp SpO2   (!) 158/78 90 18 98 °F (36.7 °C) 97 %      MAP       --          Physical Exam    Nursing note and vitals reviewed.  Constitutional: She appears well-developed and well-nourished. She is not diaphoretic. No distress.   HENT:   Head: Normocephalic and atraumatic.   Nose: Nose normal.   Eyes: EOM are normal. Pupils are equal, round, and reactive to light.   Neck: Neck supple. No JVD present.   Normal range of motion.  Cardiovascular:  Normal rate, regular rhythm, normal heart sounds and intact distal pulses.           Pulmonary/Chest: Breath sounds normal. No stridor. No respiratory distress. She has no wheezes. She has no rales.   Abdominal: Abdomen is soft. Bowel sounds are normal. She exhibits no distension. There is no abdominal tenderness.   Musculoskeletal:         General: Edema (trace bilateral lower extremity edema) present. No tenderness. Normal range of motion.      Cervical back: Normal range of motion and neck supple.     Neurological: She is alert and oriented to person, place, and time. She has normal strength.   Skin: Skin is warm and dry. Capillary refill takes less than 2 seconds. No rash noted. No erythema.         ED Course   Procedures  Labs Reviewed   CBC W/ AUTO DIFFERENTIAL - Abnormal; Notable for the following components:       Result Value    Gran # (ANC) 7.9 (*)     All other components within normal limits   COMPREHENSIVE METABOLIC PANEL   TROPONIN I   B-TYPE NATRIURETIC PEPTIDE   D DIMER, QUANTITATIVE          Imaging Results              CTA Chest Non-Coronary (PE Studies) (Final result)  Result time 11/15/23 18:26:53      Final result by Israel Jackson MD (11/15/23 18:26:53)                   Impression:      1. No evidence of PE.  2. No acute intrathoracic abnormalities identified.  3. Stable 1.2 cm right lower lobe pulmonary nodule, requiring no follow-up as appears stable compared to remote CT from 2015 and likely reflecting benign etiology.      Electronically signed by: Israel Jackson MD  Date:    11/15/2023  Time:    18:26                Narrative:    EXAMINATION:  CTA CHEST NON CORONARY (PE STUDIES)    CLINICAL HISTORY:  Pulmonary embolism (PE) suspected, neg D-dimer;Worsening SOB, inability to lay flat, neg ddimer, pos DVT study;    TECHNIQUE:  Low dose axial images, sagittal and coronal reformations were obtained from the thoracic inlet to the lung bases following the IV administration of 85 mL of Omnipaque 350.  Contrast timing was optimized to evaluate the pulmonary arteries.  MIP images were performed.    COMPARISON:  Remote CT abdomen and pelvis from April 2015.    FINDINGS:  Structures at the base of the neck are unremarkable.  Aorta is non-aneurysmal.  The heart is normal in size without pericardial effusion.  No intraluminal filling defects within the pulmonary arteries to suggest pulmonary thromboembolism.   There is no evidence of mediastinal, axillary, or hilar lymph node enlargement.  The esophagus is unremarkable along its course.    The trachea and bronchi are patent.  The lungs are symmetrically expanded.  Minimal atelectatic change or scarring is seen in the lung bases.  No evidence of focal consolidation.  No pleural effusion.  No evidence of pulmonary hemorrhage or infarction.  There is a 1.2 cm right lower lobe pulmonary nodule which was present and appears stable compared to remote CT from 2015 and likely reflecting benign etiology.    The visualized abdominal structures are unremarkable.  No acute osseous abnormality identified.  Extrathoracic soft tissues are unremarkable.                                        US Lower Extremity Veins Bilateral (Final result)  Result time 11/15/23 17:58:05      Final result by Israel Jackson MD (11/15/23 17:58:05)                   Impression:      1. Nonocclusive deep venous thrombosis within the left femoral vein.  2. No evidence of right lower extremity deep venous thrombosis.  This report was flagged in Epic as abnormal.      Electronically signed by: Israel Jackson  MD  Date:    11/15/2023  Time:    17:58               Narrative:    EXAMINATION:  US LOWER EXTREMITY VEINS BILATERAL    CLINICAL HISTORY:  Pain in leg, unspecified    TECHNIQUE:  Duplex and color flow Doppler evaluation of the bilateral lower extremity veins was performed.    COMPARISON:  None    FINDINGS:  Right lower extremity:    No evidence of clot involving the right common femoral, greater saphenous, femoral, popliteal, peroneal, anterior and posterior tibial veins.  All venous structures demonstrate normal respiratory phasicity and augment adequately.  No evidence of soft tissue mass or Baker's cyst.    Left lower extremity:    Nonocclusive thrombus is seen within the left mid to distal femoral vein.  No evidence of clot involving the left common femoral, greater saphenous, popliteal, posterior tibial, anterior tibial, and peroneal veins.                                       Medications   iohexoL (OMNIPAQUE 350) injection 85 mL (85 mLs Intravenous Given 11/15/23 1751)   apixaban tablet 10 mg (10 mg Oral Given 11/15/23 2614)     Medical Decision Making  Amount and/or Complexity of Data Reviewed  Labs: ordered.  Radiology: ordered.    Risk  Prescription drug management.      MDM:    58-year-old female past medical history as noted above presenting with concern for left leg DVT.  Physical exam as noted above.  ED workup notable for CBC/CMP within normal limits, troponin negative, BNP 19, D-dimer 0.30, CTA shows no evidence of acute PE, 1.2 cm right lower lobe pulmonary nodule, nonocclusive DVT within the left femoral vein, no evidence of right lower extremity DVT.  Patient presentation consistent with nonocclusive DVT left lower extremity appears to be non severe, will have follow-up with Dr. Mcclelland in clinic for further evaluation.  Will start on Eliquis at this time, likely hematology follow-up after this.  Discussed diagnosis and further treatment with patient and family at bedside, including f/u.  Return  precautions given and all questions answered.  Patient and family in understanding of plan.  Pt discharged to home improved and stable.        Note was created using voice recognition software. Note may have occasional typographical or grammatical errors, garbled syntax, and other bizarre constructions that may not have been identified and edited despite good liu initial review prior to signing.               Scribe Attestation:   Scribe #1: I performed the above scribed service and the documentation accurately describes the services I performed. I attest to the accuracy of the note.                I, Sandoval Parra M.D., personally performed the services described in this documentation. All medical record entries made by the scribe were at my direction and in my presence. I have reviewed the chart and agree that the record reflects my personal performance and is accurate and complete.       Clinical Impression:   Final diagnoses:  [M79.605] Left leg pain  [M79.606] Leg pain  [I82.512] Chronic deep vein thrombosis (DVT) of femoral vein of left lower extremity (Primary)        ED Disposition Condition    Discharge Stable          ED Prescriptions       Medication Sig Dispense Start Date End Date Auth. Provider    apixaban (ELIQUIS DVT-PE TREAT 30D START) 5 mg (74 tabs) DsPk  (Status: Discontinued) For the first 7 days take two 5 mg tablets twice daily.  After 7 days take one 5 mg tablet twice daily. 1 tablet 11/15/2023 11/15/2023 Sandoval Parra MD    apixaban (ELIQUIS DVT-PE TREAT 30D START) 5 mg (74 tabs) DsPk For the first 7 days take two 5 mg tablets twice daily.  After 7 days take one 5 mg tablet twice daily. 74 each 11/15/2023 -- Sandoval Parra MD          Follow-up Information       Follow up With Specialties Details Why Contact Info    [facility] - Emergency Dept Emergency Medicine Go to  If symptoms worsen [address] [facility] [place]  [address]  [phone]    Candelario Chauhan MD Cardiology, Interventional Cardiology Schedule an appointment as soon as possible for a visit   [address]  [address]  [place] [address]  [phone]      Marlene Coyle MD Hematology and Oncology, Hematology Schedule an appointment as soon as possible for a visit   [address]  [address]  [place] [address]  [phone]               Sandoval Parra MD  11/17/23 7919

## 2023-11-30 ENCOUNTER — TELEPHONE (OUTPATIENT)
Dept: HEMATOLOGY/ONCOLOGY | Facility: CLINIC | Age: 58
End: 2023-11-30

## 2023-11-30 ENCOUNTER — OFFICE VISIT (OUTPATIENT)
Dept: HEMATOLOGY/ONCOLOGY | Facility: CLINIC | Age: 58
End: 2023-11-30
Payer: COMMERCIAL

## 2023-11-30 VITALS
HEIGHT: 64 IN | DIASTOLIC BLOOD PRESSURE: 73 MMHG | BODY MASS INDEX: 28.27 KG/M2 | HEART RATE: 94 BPM | WEIGHT: 165.56 LBS | SYSTOLIC BLOOD PRESSURE: 112 MMHG | OXYGEN SATURATION: 97 %

## 2023-11-30 DIAGNOSIS — I10 ESSENTIAL HYPERTENSION: ICD-10-CM

## 2023-11-30 DIAGNOSIS — I82.402 LEG DVT (DEEP VENOUS THROMBOEMBOLISM), ACUTE, LEFT: Primary | ICD-10-CM

## 2023-11-30 DIAGNOSIS — Z79.01 ANTICOAGULATED: ICD-10-CM

## 2023-11-30 DIAGNOSIS — Z76.0 MEDICATION REFILL: ICD-10-CM

## 2023-11-30 DIAGNOSIS — G89.4 CHRONIC PAIN DISORDER: ICD-10-CM

## 2023-11-30 PROCEDURE — 3008F BODY MASS INDEX DOCD: CPT | Mod: CPTII,S$GLB,, | Performed by: STUDENT IN AN ORGANIZED HEALTH CARE EDUCATION/TRAINING PROGRAM

## 2023-11-30 PROCEDURE — 99205 PR OFFICE/OUTPT VISIT, NEW, LEVL V, 60-74 MIN: ICD-10-PCS | Mod: S$GLB,,, | Performed by: STUDENT IN AN ORGANIZED HEALTH CARE EDUCATION/TRAINING PROGRAM

## 2023-11-30 PROCEDURE — 99205 OFFICE O/P NEW HI 60 MIN: CPT | Mod: S$GLB,,, | Performed by: STUDENT IN AN ORGANIZED HEALTH CARE EDUCATION/TRAINING PROGRAM

## 2023-11-30 PROCEDURE — 3074F PR MOST RECENT SYSTOLIC BLOOD PRESSURE < 130 MM HG: ICD-10-PCS | Mod: CPTII,S$GLB,, | Performed by: STUDENT IN AN ORGANIZED HEALTH CARE EDUCATION/TRAINING PROGRAM

## 2023-11-30 PROCEDURE — 99999 PR PBB SHADOW E&M-EST. PATIENT-LVL IV: ICD-10-PCS | Mod: PBBFAC,,, | Performed by: STUDENT IN AN ORGANIZED HEALTH CARE EDUCATION/TRAINING PROGRAM

## 2023-11-30 PROCEDURE — 3078F DIAST BP <80 MM HG: CPT | Mod: CPTII,S$GLB,, | Performed by: STUDENT IN AN ORGANIZED HEALTH CARE EDUCATION/TRAINING PROGRAM

## 2023-11-30 PROCEDURE — 1159F PR MEDICATION LIST DOCUMENTED IN MEDICAL RECORD: ICD-10-PCS | Mod: CPTII,S$GLB,, | Performed by: STUDENT IN AN ORGANIZED HEALTH CARE EDUCATION/TRAINING PROGRAM

## 2023-11-30 PROCEDURE — 99999 PR PBB SHADOW E&M-EST. PATIENT-LVL IV: CPT | Mod: PBBFAC,,, | Performed by: STUDENT IN AN ORGANIZED HEALTH CARE EDUCATION/TRAINING PROGRAM

## 2023-11-30 PROCEDURE — 1159F MED LIST DOCD IN RCRD: CPT | Mod: CPTII,S$GLB,, | Performed by: STUDENT IN AN ORGANIZED HEALTH CARE EDUCATION/TRAINING PROGRAM

## 2023-11-30 PROCEDURE — 3074F SYST BP LT 130 MM HG: CPT | Mod: CPTII,S$GLB,, | Performed by: STUDENT IN AN ORGANIZED HEALTH CARE EDUCATION/TRAINING PROGRAM

## 2023-11-30 PROCEDURE — 3008F PR BODY MASS INDEX (BMI) DOCUMENTED: ICD-10-PCS | Mod: CPTII,S$GLB,, | Performed by: STUDENT IN AN ORGANIZED HEALTH CARE EDUCATION/TRAINING PROGRAM

## 2023-11-30 PROCEDURE — 4010F ACE/ARB THERAPY RXD/TAKEN: CPT | Mod: CPTII,S$GLB,, | Performed by: STUDENT IN AN ORGANIZED HEALTH CARE EDUCATION/TRAINING PROGRAM

## 2023-11-30 PROCEDURE — 4010F PR ACE/ARB THEARPY RXD/TAKEN: ICD-10-PCS | Mod: CPTII,S$GLB,, | Performed by: STUDENT IN AN ORGANIZED HEALTH CARE EDUCATION/TRAINING PROGRAM

## 2023-11-30 PROCEDURE — 3078F PR MOST RECENT DIASTOLIC BLOOD PRESSURE < 80 MM HG: ICD-10-PCS | Mod: CPTII,S$GLB,, | Performed by: STUDENT IN AN ORGANIZED HEALTH CARE EDUCATION/TRAINING PROGRAM

## 2023-11-30 RX ORDER — SERTRALINE HYDROCHLORIDE 50 MG/1
1 TABLET, FILM COATED ORAL DAILY
COMMUNITY
End: 2023-12-29

## 2023-11-30 NOTE — Clinical Note
-RTC in 7 weeks for MD visit for all the coag labs I ordered today -RTC in 9 weeks for MD visit to review results -Please give pt my business card

## 2023-11-30 NOTE — PROGRESS NOTES
Hematology- Oncology Clinic Note :     11/30/2023    Chief Complaint   Patient presents with    New Patient     DVT    Establish Care    Deep Vein Thrombosis    Anticoagulation         HPI  Pt is a 58 y.o. female who  has a past medical history of Asthma, Brain mass (2007), Depression, Fibromyalgia, Hypertension, and Seizures (2007)..  Who presents for follow up of DVT.  Pt recently presented to  ED after complaining of bilateral leg swelling 2 weeks duration.  CTA negative for PE but LLE US positive for non occlusive DVT.  Patient also states she had a knee replacement surgery one year ago and a few months later she had a blood clot. Patient further states she had an epidural injection last Friday prior to ED presentation. Patient denied recent flights or was on blood thinners. Even though clots most likely provoked since she has a hx of multiple clots hypercoag workup ordered and will benefit from lifelong eliquis. Bleeding precautions given and pt agreeable to continue eliquis.          Active Problem List with Overview Notes    Diagnosis Date Noted    Left ureteral calculus 07/31/2020    Bacteremia due to Escherichia coli 07/19/2020    E. coli UTI 07/19/2020    Possible COVID-19 Infection 07/19/2020    Essential hypertension 07/19/2020    Seizure disorder 07/19/2020    Asthma 07/19/2020    MDD (major depressive disorder) 07/19/2020    Ureteral stone 07/19/2020    Chronic pain disorder 08/06/2018    Fibromyalgia 08/06/2018    Acute post-traumatic headache, not intractable     Nephrolithiasis 04/07/2015       Patient Active Problem List    Diagnosis Date Noted    Left ureteral calculus 07/31/2020    Bacteremia due to Escherichia coli 07/19/2020    E. coli UTI 07/19/2020    Possible COVID-19 Infection 07/19/2020    Essential hypertension 07/19/2020    Seizure disorder 07/19/2020    Asthma 07/19/2020    MDD (major depressive disorder) 07/19/2020    Ureteral stone 07/19/2020    Chronic pain disorder 08/06/2018     Fibromyalgia 2018    Acute post-traumatic headache, not intractable     Nephrolithiasis 2015     Past Medical History:   Diagnosis Date    Asthma     Brain mass     Depression     Fibromyalgia     Hypertension     Seizures 2007    fell and hit head at grocery         Past Surgical History:   Procedure Laterality Date    CARPAL TUNNEL RELEASE      right     SECTION      x 3    cysto/ureteroscopy stent placement      CYSTOSCOPY W/ URETERAL STENT PLACEMENT Left 2020    Procedure: CYSTOSCOPY, WITH URETERAL STENT INSERTION;  Surgeon: Yessi Granados MD;  Location: [facility] OR;  Service: Urology;  Laterality: Left;    HYSTERECTOMY      RETROGRADE PYELOGRAPHY N/A 2020    Procedure: PYELOGRAM, RETROGRADE;  Surgeon: Yessi Granados MD;  Location: [facility] OR;  Service: Urology;  Laterality: N/A;    TONSILLECTOMY      URETEROSCOPIC REMOVAL OF URETERIC CALCULUS Left 2020    Procedure: Cystoscopy, possible retrograde pyelogram, ureteroscopy with laser lithotripsy, placement of ureteral stent;  Surgeon: Yessi Granados MD;  Location: [facility] OR;  Service: Urology;  Laterality: Left;  RN PREOP 2020---COVID NEGATIVE ON       (Not in a hospital admission)    Review of patient's allergies indicates:   Allergen Reactions    Penicillins Itching      Social History     Tobacco Use    Smoking status: Never    Smokeless tobacco: Never   Substance Use Topics    Alcohol use: Yes     Comment: occasionally      Family History   Problem Relation Age of Onset    Cancer Mother         Breast cancer    Heart disease Mother     Diabetes Father     Cancer Maternal Grandmother         Lung Cancer    Cancer Maternal Grandfather         Lung Cancer    Cancer Paternal Grandmother     Cancer Paternal Grandfather         Review of Systems :  Review of Systems   Constitutional:  Negative for chills, fever, malaise/fatigue and weight loss.   HENT:  Negative for hearing loss.    Eyes:  Negative for  blurred vision and pain.   Respiratory:  Negative for cough, sputum production and shortness of breath.    Cardiovascular:  Positive for leg swelling.   Gastrointestinal:  Negative for constipation, diarrhea and heartburn.   Musculoskeletal:  Positive for joint pain and myalgias.   Skin:  Negative for itching and rash.   Neurological:  Negative for dizziness and sensory change.   Endo/Heme/Allergies:  Does not bruise/bleed easily.   Psychiatric/Behavioral:  Negative for depression. The patient is not nervous/anxious.        Physical Exam :  Wt Readings from Last 3 Encounters:   11/30/23 75.1 kg (165 lb 9.1 oz)   11/15/23 77.1 kg (170 lb)   07/30/20 80 kg (176 lb 5.9 oz)     Temp Readings from Last 3 Encounters:   11/15/23 98 °F (36.7 °C) (Oral)   07/31/20 97.8 °F (36.6 °C) (Oral)   07/29/20 98.5 °F (36.9 °C) (Temporal)     BP Readings from Last 3 Encounters:   11/30/23 112/73   11/15/23 (!) 152/65   07/31/20 (!) 118/58     Pulse Readings from Last 3 Encounters:   11/30/23 94   11/15/23 73   07/31/20 79     Body mass index is 28.42 kg/m².    Physical Exam  Vitals reviewed.   HENT:      Head: Normocephalic and atraumatic.      Nose: Nose normal.      Mouth/Throat:      Mouth: Mucous membranes are moist.   Eyes:      Pupils: Pupils are equal, round, and reactive to light.   Cardiovascular:      Rate and Rhythm: Normal rate and regular rhythm.   Pulmonary:      Effort: Pulmonary effort is normal.      Breath sounds: Normal breath sounds.   Abdominal:      General: Abdomen is flat.   Musculoskeletal:         General: Normal range of motion.      Cervical back: Normal range of motion and neck supple.   Skin:     General: Skin is warm and dry.   Neurological:      Mental Status: She is alert. Mental status is at baseline.   Psychiatric:         Mood and Affect: Mood normal.         Behavior: Behavior normal.         Pertinent Diagnostic studies:    No results found for this or any previous visit (from the past 24  hour(s)).    Assessment/Plan :       LLE DVT/Hx of clots  -Past DVT most likely provoked by surgery  -Seen on recent ED visit after epidural, inactivity can contribute  -Nonocclusive deep venous thrombosis within the left femoral vein on US  -Has cardiology follow up  -Due to hx of multiple clots will get hypercoag workup prior to next visit  -Would recommend lifelong eliquis, refills sent to pharmacy  -RTC in 7 weeks for hypercoag labs and in 9 weeks for MD visit to review results        Lung nodule  -Seen on recent CTA  -Stable 1.2 cm right lower lobe pulmonary nodule, requiring no follow-up as appears stable compared to remote CT from 2015 and likely reflecting benign etiology per radiology   -Pt following with pulmonologist       Time spent on case: 60 minutes    Summary of orders placed this encounter:  Orders Placed This Encounter   Procedures    Antithrombin III    Beta-2 Glycoprotein Abs (IgA, IgG, IgM)    Cardiolipin antibody    DRVVT    Factor 5 leiden    Protein S Activity    Protein C Activity    Prothrombin [id number] Mutation       No future appointments.          Kelin Mcdaniels MD   Hematology/oncology, [facility]

## 2023-12-01 NOTE — TELEPHONE ENCOUNTER
Spoke with patient, let her know that the prescription has been sent to the [facility], she verbalized understanding and had no other questions at this time.

## 2023-12-26 NOTE — ASSESSMENT & PLAN NOTE
"-History noted but no anti-hypertensive medications on home med list  -Was on a "fluid pill" but did not get refill   -BP normal at this time  -Monitor and start norvasc if need    " Medication teaching and assessment/Observation and assessment/Rehabilitation services Statement Selected

## 2023-12-29 ENCOUNTER — HOSPITAL ENCOUNTER (EMERGENCY)
Facility: HOSPITAL | Age: 58
Discharge: HOME | End: 2023-12-29
Attending: EMERGENCY MEDICINE
Payer: COMMERCIAL

## 2023-12-29 VITALS
BODY MASS INDEX: 27.31 KG/M2 | WEIGHT: 160 LBS | HEIGHT: 64 IN | HEART RATE: 68 BPM | OXYGEN SATURATION: 98 % | RESPIRATION RATE: 20 BRPM | TEMPERATURE: 98 F | DIASTOLIC BLOOD PRESSURE: 62 MMHG | SYSTOLIC BLOOD PRESSURE: 111 MMHG

## 2023-12-29 DIAGNOSIS — M72.2 PLANTAR FASCIITIS OF LEFT FOOT: Primary | ICD-10-CM

## 2023-12-29 DIAGNOSIS — R11.0 NAUSEA: ICD-10-CM

## 2023-12-29 DIAGNOSIS — R06.02 SHORTNESS OF BREATH: ICD-10-CM

## 2023-12-29 DIAGNOSIS — I82.412 ACUTE DEEP VEIN THROMBOSIS (DVT) OF FEMORAL VEIN OF LEFT LOWER EXTREMITY: ICD-10-CM

## 2023-12-29 DIAGNOSIS — M79.605 LEFT LEG PAIN: ICD-10-CM

## 2023-12-29 LAB
ALBUMIN SERPL BCP-MCNC: 4.1 G/DL (ref 3.5–5.2)
ALP SERPL-CCNC: 65 U/L (ref 55–135)
ALT SERPL W/O P-5'-P-CCNC: 16 U/L (ref 10–44)
ANION GAP SERPL CALC-SCNC: 9 MMOL/L (ref 8–16)
AST SERPL-CCNC: 17 U/L (ref 10–40)
BASOPHILS # BLD AUTO: 0.04 K/UL (ref 0–0.2)
BASOPHILS NFR BLD: 0.6 % (ref 0–1.9)
BILIRUB SERPL-MCNC: 0.7 MG/DL (ref 0.1–1)
BNP SERPL-MCNC: 19 PG/ML (ref 0–99)
BUN SERPL-MCNC: 10 MG/DL (ref 6–20)
CALCIUM SERPL-MCNC: 10.2 MG/DL (ref 8.7–10.5)
CHLORIDE SERPL-SCNC: 108 MMOL/L (ref 95–110)
CO2 SERPL-SCNC: 24 MMOL/L (ref 23–29)
CREAT SERPL-MCNC: 0.8 MG/DL (ref 0.5–1.4)
DIFFERENTIAL METHOD BLD: ABNORMAL
EOSINOPHIL # BLD AUTO: 0.2 K/UL (ref 0–0.5)
EOSINOPHIL NFR BLD: 3.2 % (ref 0–8)
ERYTHROCYTE [DISTWIDTH] IN BLOOD BY AUTOMATED COUNT: 12.2 % (ref 11.5–14.5)
EST. GFR  (NO RACE VARIABLE): >60 ML/MIN/1.73 M^2
GLUCOSE SERPL-MCNC: 93 MG/DL (ref 70–110)
HCT VFR BLD AUTO: 43 % (ref 37–48.5)
HGB BLD-MCNC: 13.8 G/DL (ref 12–16)
IMM GRANULOCYTES # BLD AUTO: 0.04 K/UL (ref 0–0.04)
IMM GRANULOCYTES NFR BLD AUTO: 0.6 % (ref 0–0.5)
LYMPHOCYTES # BLD AUTO: 2.4 K/UL (ref 1–4.8)
LYMPHOCYTES NFR BLD: 37.8 % (ref 18–48)
MCH RBC QN AUTO: 29.2 PG (ref 27–31)
MCHC RBC AUTO-ENTMCNC: 32.1 G/DL (ref 32–36)
MCV RBC AUTO: 91 FL (ref 82–98)
MONOCYTES # BLD AUTO: 0.3 K/UL (ref 0.3–1)
MONOCYTES NFR BLD: 4.4 % (ref 4–15)
NEUTROPHILS # BLD AUTO: 3.4 K/UL (ref 1.8–7.7)
NEUTROPHILS NFR BLD: 53.4 % (ref 38–73)
NRBC BLD-RTO: 0 /100 WBC
PLATELET # BLD AUTO: 245 K/UL (ref 150–450)
PMV BLD AUTO: 10.8 FL (ref 9.2–12.9)
POTASSIUM SERPL-SCNC: 4.2 MMOL/L (ref 3.5–5.1)
PROT SERPL-MCNC: 7.5 G/DL (ref 6–8.4)
RBC # BLD AUTO: 4.73 M/UL (ref 4–5.4)
SODIUM SERPL-SCNC: 141 MMOL/L (ref 136–145)
TROPONIN I SERPL DL<=0.01 NG/ML-MCNC: <0.006 NG/ML (ref 0–0.03)
WBC # BLD AUTO: 6.33 K/UL (ref 3.9–12.7)

## 2023-12-29 PROCEDURE — 85025 COMPLETE CBC W/AUTO DIFF WBC: CPT | Performed by: EMERGENCY MEDICINE

## 2023-12-29 PROCEDURE — 84484 ASSAY OF TROPONIN QUANT: CPT | Performed by: EMERGENCY MEDICINE

## 2023-12-29 PROCEDURE — 93010 ELECTROCARDIOGRAM REPORT: CPT | Mod: ,,, | Performed by: INTERNAL MEDICINE

## 2023-12-29 PROCEDURE — 83880 ASSAY OF NATRIURETIC PEPTIDE: CPT | Performed by: EMERGENCY MEDICINE

## 2023-12-29 PROCEDURE — 25000003 PHARM REV CODE 250: Performed by: EMERGENCY MEDICINE

## 2023-12-29 PROCEDURE — 93005 ELECTROCARDIOGRAM TRACING: CPT

## 2023-12-29 PROCEDURE — 80053 COMPREHEN METABOLIC PANEL: CPT | Performed by: EMERGENCY MEDICINE

## 2023-12-29 PROCEDURE — 99285 EMERGENCY DEPT VISIT HI MDM: CPT | Mod: 25

## 2023-12-29 RX ORDER — ONDANSETRON 4 MG/1
4 TABLET, FILM COATED ORAL EVERY 8 HOURS PRN
Qty: 12 TABLET | Refills: 0 | Status: SHIPPED | OUTPATIENT
Start: 2023-12-29

## 2023-12-29 RX ORDER — ONDANSETRON 4 MG/1
4 TABLET, ORALLY DISINTEGRATING ORAL
Status: COMPLETED | OUTPATIENT
Start: 2023-12-29 | End: 2023-12-29

## 2023-12-29 RX ADMIN — ONDANSETRON 4 MG: 4 TABLET, ORALLY DISINTEGRATING ORAL at 12:12

## 2023-12-29 NOTE — ED TRIAGE NOTES
"Pt arrived to ED with c/o bilateral leg pain and swelling. Pt reports being diagnosed with DVT in L leg a "few months" ago and placed on blood thinners.reports recently an increase in pain and swelling. Reports compliant with blood thinners. Reports SOB and N/V. Denies CP.   "

## 2023-12-29 NOTE — DISCHARGE INSTRUCTIONS
Please wear high quality walking shoes with a padded heel for your heel pain.  Continue your Eliquis 5 mg twice a day.  Return immediately if you get worse or if new problems develop.  Please follow-up with your primary care doctor in 1 week.  Rest.  Zofran for nausea.

## 2023-12-29 NOTE — ED PROVIDER NOTES
Encounter Date: 2023    SCRIBE #1 NOTE: I, Filiberto Hyatt, am scribing for, and in the presence of,  Xavier Draper MD. Other sections scribed: HPI, ROS.       History     Chief Complaint   Patient presents with    Leg Pain     Pt co of bilateral lower leg pain and swelling. Pain located in calf and ankles. Recently diagnosed with blood clots to both legs, and when called MD to inform of pain they told her to come to ED. Pt is on blood thinners. Pt also co of SOB x 2 days, and N/V x 1 week. Denies CP or fevers.    Shortness of Breath    Vomiting     CC: Leg pain    HPI: History is obtained from independent historian. This is a 58 y.o. F who has a PMHx of Asthma, Brain mass, Fibromyalgia, HTN, and Seizures who presents to the ED for emergent evaluation of acute increased pain and swelling in bilateral lower extremities. Pt states that the pain is significant in the left calf, and the heel the left foot. Pt reports a Hx of DVT in the left lower extremity a total of times. One episode was after surgery on the left knee. She states that it is unknown why she is developing blood clots. She is followed by a Hematologist. She has been taking Eliquis 10mg BID for the last couple of months. Pt denies fever, chills, nasal congestion, runny nose, ear pain, sore throat, eye problem, cough, SOB, CP, abdominal pain, nausea, vomiting, diarrhea, dysuria, arm problem, rash, headache, or fall.        The history is provided by the patient. No  was used.     Review of patient's allergies indicates:   Allergen Reactions    Penicillins Itching     Past Medical History:   Diagnosis Date    Asthma     Brain mass     Depression     Fibromyalgia     Hypertension     Seizures 2007    fell and hit head at grocery        Past Surgical History:   Procedure Laterality Date    CARPAL TUNNEL RELEASE      right     SECTION      x 3    cysto/ureteroscopy stent placement      CYSTOSCOPY W/ URETERAL STENT  PLACEMENT Left 7/20/2020    Procedure: CYSTOSCOPY, WITH URETERAL STENT INSERTION;  Surgeon: Yessi Granados MD;  Location: [facility] OR;  Service: Urology;  Laterality: Left;    HYSTERECTOMY      RETROGRADE PYELOGRAPHY N/A 7/20/2020    Procedure: PYELOGRAM, RETROGRADE;  Surgeon: Yessi Granados MD;  Location: [facility] OR;  Service: Urology;  Laterality: N/A;    TONSILLECTOMY      URETEROSCOPIC REMOVAL OF URETERIC CALCULUS Left 7/31/2020    Procedure: Cystoscopy, possible retrograde pyelogram, ureteroscopy with laser lithotripsy, placement of ureteral stent;  Surgeon: Yessi Granados MD;  Location: [facility] OR;  Service: Urology;  Laterality: Left;  RN PREOP 7/29/2020---COVID NEGATIVE ON 7/29     Family History   Problem Relation Age of Onset    Cancer Mother         Breast cancer    Heart disease Mother     Diabetes Father     Cancer Maternal Grandmother         Lung Cancer    Cancer Maternal Grandfather         Lung Cancer    Cancer Paternal Grandmother     Cancer Paternal Grandfather      Social History     Tobacco Use    Smoking status: Never    Smokeless tobacco: Never   Substance Use Topics    Alcohol use: Yes     Comment: occasionally    Drug use: No     Review of Systems   Constitutional:  Negative for chills and fever.   HENT:  Negative for congestion, ear pain, rhinorrhea and sore throat.    Eyes:  Negative for visual disturbance.   Respiratory:  Negative for cough and shortness of breath.    Cardiovascular:  Positive for leg swelling (bilateral). Negative for chest pain.   Gastrointestinal:  Negative for abdominal pain, diarrhea, nausea and vomiting.   Genitourinary:  Negative for dysuria.   Musculoskeletal:  Positive for myalgias (in bilateral lower extremities, left heel pain). Negative for back pain.   Skin:  Negative for rash.   Neurological:  Negative for weakness and headaches.   Hematological:  Does not bruise/bleed easily.       Physical Exam     Initial Vitals [12/29/23 1046]   BP Pulse Resp  Temp SpO2   125/77 78 18 98.1 °F (36.7 °C) 96 %      MAP       --         Physical Exam  The patient was examined specifically for the following:   General:No significant distress, Good color, Warm and dry. Head and neck:Scalp atraumatic, Neck supple. Neurological:Appropriate conversation, Gross motor deficits. Eyes:Conjugate gaze, Clear corneas. ENT: No epistaxis. Cardiac: Regular rate and rhythm, Grossly normal heart tones. Pulmonary: Wheezing, Rales. Gastrointestinal: Abdominal tenderness, Abdominal distention. Musculoskeletal: Extremity deformity, Apparent pain with range of motion of the joints. Skin: Rash.   The findings on examination were normal except for the following:  There is no significant swelling or tenderness of either lower extremity.  The lungs are clear.  The heart tones are normal.  The abdomen is soft.  There is no guarding rebound mass or distention.  ED Course   Procedures  Labs Reviewed   CBC W/ AUTO DIFFERENTIAL - Abnormal; Notable for the following components:       Result Value    Immature Granulocytes 0.6 (*)     All other components within normal limits   COMPREHENSIVE METABOLIC PANEL   B-TYPE NATRIURETIC PEPTIDE   TROPONIN I   TROPONIN I     EKG Readings: (Independently Interpreted)   This patient is in a normal sinus rhythm heart rate of 82.  There are no significant ST segment or T-wave changes.  There is no definite evidence of acute myocardial infarction or malignant arrhythmia.  The axis is normal.  Intervals are normal.  This is a normal EKG.  This is doctor Draper dictating an I independently interpreted this EKG.       Imaging Results              US Lower Extremity Veins Bilateral (Final result)  Result time 12/29/23 14:44:52      Final result by Peyman Berry MD (12/29/23 14:44:52)                   Impression:      Suspected ongoing nonocclusive thrombus in the left femoral vein.  No DVT in the right lower extremity.      Electronically signed by: Peyman Berry  MD  Date:    12/29/2023  Time:    14:44               Narrative:    EXAMINATION:  US LOWER EXTREMITY VEINS BILATERAL    CLINICAL HISTORY:  Pain in left leg    TECHNIQUE:  Duplex and color flow Doppler and dynamic compression was performed of the bilateral lower extremity veins was performed.    COMPARISON:  Bilateral lower extremity venous upper ultrasound 11/15/2023    FINDINGS:  Right thigh veins: The common femoral, femoral, popliteal, and upper greater saphenous veins are patent and free of thrombus. The remaining veins are normally compressible and have normal phasic flow and augmentation response.    Right calf veins: The visualized calf veins are patent.    Left thigh veins: Redemonstrated nonocclusive thrombus within the mid femoral vein.  The common femoral, popliteal, and upper greater saphenous veins are patent and free of thrombus.  The veins are normally compressible and have normal phasic flow and augmentation response.    Left calf veins: The visualized calf veins are patent.    Miscellaneous: None                                       X-Ray Chest AP Portable (Final result)  Result time 12/29/23 12:16:32      Final result by Iram Judd MD (12/29/23 12:16:32)                   Impression:      No acute abnormality.      Electronically signed by: Iram Judd MD  Date:    12/29/2023  Time:    12:16               Narrative:    EXAMINATION:  XR CHEST AP PORTABLE    CLINICAL HISTORY:  CHF;    TECHNIQUE:  Single frontal view of the chest was performed.    COMPARISON:  07/19/2020    FINDINGS:  The lungs are clear, with normal appearance of pulmonary vasculature and no pleural effusion or pneumothorax.    The cardiac silhouette is normal in size. The hilar and mediastinal contours are unremarkable.    Bones are intact.                                       Medications   ondansetron disintegrating tablet 4 mg (4 mg Oral Given 12/29/23 1258)     Medical Decision Making  Amount and/or  Complexity of Data Reviewed  Independent Historian:      Details: See HPI    Risk  Prescription drug management.    Given the above, this patient presents emergency room complaining of bilateral lower leg pain and swelling.  The patient has pain warmth of the left than on the right.  She has pain in the heel on the left.  There is no swelling erythema warmth or ecchymosis.  This could be plantar fasciitis.  The patient is being treated for DVT in the left lower extremity.  The patient is still has DVT left femoral vein.  The patient has none of the stigmata of pulmonary embolus.  She has good oxygen saturations there is no tachycardia.  There is no clinical evidence of respiratory distress or habits some complaints of shortness of breath.  Chest x-ray is negative for pneumothorax pneumonia pleural effusion.  Troponin and BNP are negative chest x-ray fails to reveal pulmonary edema there is no erythema warmth or ecchymosis.  The patient is compliant with her Eliquis.  She should be on 5 mg twice a day.  There is no chest pain pressure tightness.  The patient reports some nausea at discharge.  I will treat her symptomatically.        Scribe Attestation:   Scribe #1: I performed the above scribed service and the documentation accurately describes the services I performed. I attest to the accuracy of the note.                             Clinical Impression:  Final diagnoses:  [R06.02] Shortness of breath  [M79.605] Left leg pain  [M72.2] Plantar fasciitis of left foot (Primary)  [I82.412] Acute deep vein thrombosis (DVT) of femoral vein of left lower extremity  [R11.0] Nausea          ED Disposition Condition    Discharge Stable          ED Prescriptions       Medication Sig Dispense Start Date End Date Auth. Provider    ondansetron (ZOFRAN) 4 MG tablet Take 1 tablet (4 mg total) by mouth every 8 (eight) hours as needed. 12 tablet 12/29/2023 -- Xavier Draper MD          Follow-up Information       Follow up With  Specialties Details Why Contact Info    Ky Mann MD General Practice In 1 week  [address]  [address]  [place] [id number] [phone]               Xavier Draper MD  12/29/23 4617

## 2024-01-18 ENCOUNTER — LAB VISIT (OUTPATIENT)
Dept: LAB | Facility: HOSPITAL | Age: 59
End: 2024-01-18
Attending: STUDENT IN AN ORGANIZED HEALTH CARE EDUCATION/TRAINING PROGRAM
Payer: COMMERCIAL

## 2024-01-18 DIAGNOSIS — I82.402 LEG DVT (DEEP VENOUS THROMBOEMBOLISM), ACUTE, LEFT: ICD-10-CM

## 2024-01-18 PROCEDURE — 81240 F2 GENE: CPT | Performed by: STUDENT IN AN ORGANIZED HEALTH CARE EDUCATION/TRAINING PROGRAM

## 2024-01-18 PROCEDURE — 86147 CARDIOLIPIN ANTIBODY EA IG: CPT | Mod: 59 | Performed by: STUDENT IN AN ORGANIZED HEALTH CARE EDUCATION/TRAINING PROGRAM

## 2024-01-18 PROCEDURE — 81241 F5 GENE: CPT | Performed by: STUDENT IN AN ORGANIZED HEALTH CARE EDUCATION/TRAINING PROGRAM

## 2024-01-18 PROCEDURE — 86146 BETA-2 GLYCOPROTEIN ANTIBODY: CPT | Mod: 59 | Performed by: STUDENT IN AN ORGANIZED HEALTH CARE EDUCATION/TRAINING PROGRAM

## 2024-01-18 PROCEDURE — 85306 CLOT INHIBIT PROT S FREE: CPT | Performed by: STUDENT IN AN ORGANIZED HEALTH CARE EDUCATION/TRAINING PROGRAM

## 2024-01-18 PROCEDURE — 85303 CLOT INHIBIT PROT C ACTIVITY: CPT | Performed by: STUDENT IN AN ORGANIZED HEALTH CARE EDUCATION/TRAINING PROGRAM

## 2024-01-18 PROCEDURE — 85300 ANTITHROMBIN III ACTIVITY: CPT | Performed by: STUDENT IN AN ORGANIZED HEALTH CARE EDUCATION/TRAINING PROGRAM

## 2024-01-18 PROCEDURE — 85613 RUSSELL VIPER VENOM DILUTED: CPT | Performed by: STUDENT IN AN ORGANIZED HEALTH CARE EDUCATION/TRAINING PROGRAM

## 2024-01-22 LAB
APTT IMM NP PPP: NORMAL SEC (ref 32–48)
APTT P HEP NEUT PPP: NORMAL SEC (ref 32–48)
AT III ACT/NOR PPP CHRO: 82 % (ref 83–118)
CONFIRM APTT STACLOT: NORMAL
DRVVT SCREEN TO CONFIRM RATIO: NORMAL RATIO
LA 3 SCREEN W REFLEX-IMP: NORMAL
LA APTT+DRVVT+PT W REFLEX PPP: NORMAL
MIXING DRVVT: NORMAL SEC (ref 33–44)
PROT C ACT/NOR PPP CHRO: 132 % (ref 70–150)
PROT S ACT/NOR PPP: 134 % (ref 65–150)
PROTHROMBIN TIME: 14 SEC (ref 12–15.5)
REPTILASE TIME: NORMAL SEC
SCREEN APTT: 45 SEC (ref 32–48)
SCREEN DRVVT: 41 SEC (ref 33–44)
THROMBIN TIME: NORMAL SEC (ref 14.7–19.5)

## 2024-01-24 LAB
B2 GLYCOPROT1 IGA SER QL: 2.1 U/ML
B2 GLYCOPROT1 IGG SER QL: 0.9 U/ML
B2 GLYCOPROT1 IGM SER QL: <2.4 U/ML
CARDIOLIPIN IGG SER IA-ACNC: <9.4 GPL (ref 0–14.99)
CARDIOLIPIN IGM SER IA-ACNC: <9.4 MPL (ref 0–12.49)
F2 C.20210G>A GENO BLD/T: NEGATIVE
F5 P.R506Q BLD/T QL: NEGATIVE

## 2024-02-01 ENCOUNTER — OFFICE VISIT (OUTPATIENT)
Dept: HEMATOLOGY/ONCOLOGY | Facility: CLINIC | Age: 59
End: 2024-02-01
Payer: COMMERCIAL

## 2024-02-01 ENCOUNTER — HOSPITAL ENCOUNTER (EMERGENCY)
Facility: HOSPITAL | Age: 59
Discharge: HOME | End: 2024-02-01
Attending: EMERGENCY MEDICINE
Payer: COMMERCIAL

## 2024-02-01 VITALS
RESPIRATION RATE: 19 BRPM | HEIGHT: 64 IN | HEART RATE: 68 BPM | TEMPERATURE: 98 F | DIASTOLIC BLOOD PRESSURE: 62 MMHG | SYSTOLIC BLOOD PRESSURE: 116 MMHG | OXYGEN SATURATION: 99 % | WEIGHT: 175 LBS | BODY MASS INDEX: 29.88 KG/M2

## 2024-02-01 VITALS
HEIGHT: 64 IN | WEIGHT: 175.5 LBS | BODY MASS INDEX: 29.96 KG/M2 | DIASTOLIC BLOOD PRESSURE: 82 MMHG | OXYGEN SATURATION: 97 % | SYSTOLIC BLOOD PRESSURE: 131 MMHG | HEART RATE: 81 BPM

## 2024-02-01 DIAGNOSIS — J98.01 BRONCHOSPASM: ICD-10-CM

## 2024-02-01 DIAGNOSIS — G89.4 CHRONIC PAIN DISORDER: ICD-10-CM

## 2024-02-01 DIAGNOSIS — R60.0 LOWER EXTREMITY EDEMA: ICD-10-CM

## 2024-02-01 DIAGNOSIS — I10 ESSENTIAL HYPERTENSION: ICD-10-CM

## 2024-02-01 DIAGNOSIS — R07.9 CHEST PAIN: ICD-10-CM

## 2024-02-01 DIAGNOSIS — Z79.01 ANTICOAGULATED: ICD-10-CM

## 2024-02-01 DIAGNOSIS — U07.1 COVID-19: Primary | ICD-10-CM

## 2024-02-01 DIAGNOSIS — I82.402 LEG DVT (DEEP VENOUS THROMBOEMBOLISM), ACUTE, LEFT: Primary | ICD-10-CM

## 2024-02-01 DIAGNOSIS — Z76.0 MEDICATION REFILL: ICD-10-CM

## 2024-02-01 DIAGNOSIS — U07.1 COVID: ICD-10-CM

## 2024-02-01 DIAGNOSIS — I82.412 DVT OF DEEP FEMORAL VEIN, LEFT: ICD-10-CM

## 2024-02-01 LAB
ALBUMIN SERPL BCP-MCNC: 3.8 G/DL (ref 3.5–5.2)
ALP SERPL-CCNC: 59 U/L (ref 55–135)
ALT SERPL W/O P-5'-P-CCNC: 19 U/L (ref 10–44)
ANION GAP SERPL CALC-SCNC: 8 MMOL/L (ref 8–16)
AST SERPL-CCNC: 21 U/L (ref 10–40)
BASOPHILS # BLD AUTO: 0.04 K/UL (ref 0–0.2)
BASOPHILS NFR BLD: 0.7 % (ref 0–1.9)
BILIRUB SERPL-MCNC: 0.5 MG/DL (ref 0.1–1)
BUN SERPL-MCNC: 13 MG/DL (ref 6–20)
CALCIUM SERPL-MCNC: 9.5 MG/DL (ref 8.7–10.5)
CHLORIDE SERPL-SCNC: 111 MMOL/L (ref 95–110)
CK SERPL-CCNC: 47 U/L (ref 20–180)
CO2 SERPL-SCNC: 25 MMOL/L (ref 23–29)
CREAT SERPL-MCNC: 0.7 MG/DL (ref 0.5–1.4)
CTP QC/QA: YES
CTP QC/QA: YES
DIFFERENTIAL METHOD BLD: NORMAL
EOSINOPHIL # BLD AUTO: 0.2 K/UL (ref 0–0.5)
EOSINOPHIL NFR BLD: 3.7 % (ref 0–8)
ERYTHROCYTE [DISTWIDTH] IN BLOOD BY AUTOMATED COUNT: 12.3 % (ref 11.5–14.5)
EST. GFR  (NO RACE VARIABLE): >60 ML/MIN/1.73 M^2
GLUCOSE SERPL-MCNC: 98 MG/DL (ref 70–110)
HCT VFR BLD AUTO: 42.7 % (ref 37–48.5)
HGB BLD-MCNC: 13.7 G/DL (ref 12–16)
IMM GRANULOCYTES # BLD AUTO: 0.03 K/UL (ref 0–0.04)
IMM GRANULOCYTES NFR BLD AUTO: 0.5 % (ref 0–0.5)
INR PPP: 1 (ref 0.8–1.2)
LYMPHOCYTES # BLD AUTO: 2.4 K/UL (ref 1–4.8)
LYMPHOCYTES NFR BLD: 40.3 % (ref 18–48)
MCH RBC QN AUTO: 29.1 PG (ref 27–31)
MCHC RBC AUTO-ENTMCNC: 32.1 G/DL (ref 32–36)
MCV RBC AUTO: 91 FL (ref 82–98)
MONOCYTES # BLD AUTO: 0.4 K/UL (ref 0.3–1)
MONOCYTES NFR BLD: 6.1 % (ref 4–15)
NEUTROPHILS # BLD AUTO: 2.9 K/UL (ref 1.8–7.7)
NEUTROPHILS NFR BLD: 48.7 % (ref 38–73)
NRBC BLD-RTO: 0 /100 WBC
PLATELET # BLD AUTO: 263 K/UL (ref 150–450)
PMV BLD AUTO: 10.6 FL (ref 9.2–12.9)
POC MOLECULAR INFLUENZA A AGN: NEGATIVE
POC MOLECULAR INFLUENZA B AGN: NEGATIVE
POTASSIUM SERPL-SCNC: 4.3 MMOL/L (ref 3.5–5.1)
PROT SERPL-MCNC: 7.2 G/DL (ref 6–8.4)
PROTHROMBIN TIME: 10.7 SEC (ref 9–12.5)
RBC # BLD AUTO: 4.7 M/UL (ref 4–5.4)
SARS-COV-2 RDRP RESP QL NAA+PROBE: POSITIVE
SODIUM SERPL-SCNC: 144 MMOL/L (ref 136–145)
WBC # BLD AUTO: 5.9 K/UL (ref 3.9–12.7)

## 2024-02-01 PROCEDURE — 94761 N-INVAS EAR/PLS OXIMETRY MLT: CPT

## 2024-02-01 PROCEDURE — 94640 AIRWAY INHALATION TREATMENT: CPT | Mod: XB

## 2024-02-01 PROCEDURE — 25000242 PHARM REV CODE 250 ALT 637 W/ HCPCS

## 2024-02-01 PROCEDURE — 99285 EMERGENCY DEPT VISIT HI MDM: CPT | Mod: 25

## 2024-02-01 PROCEDURE — 3075F SYST BP GE 130 - 139MM HG: CPT | Mod: CPTII,S$GLB,, | Performed by: STUDENT IN AN ORGANIZED HEALTH CARE EDUCATION/TRAINING PROGRAM

## 2024-02-01 PROCEDURE — 3008F BODY MASS INDEX DOCD: CPT | Mod: CPTII,S$GLB,, | Performed by: STUDENT IN AN ORGANIZED HEALTH CARE EDUCATION/TRAINING PROGRAM

## 2024-02-01 PROCEDURE — 99214 OFFICE O/P EST MOD 30 MIN: CPT | Mod: S$GLB,,, | Performed by: STUDENT IN AN ORGANIZED HEALTH CARE EDUCATION/TRAINING PROGRAM

## 2024-02-01 PROCEDURE — 96361 HYDRATE IV INFUSION ADD-ON: CPT

## 2024-02-01 PROCEDURE — 85025 COMPLETE CBC W/AUTO DIFF WBC: CPT | Performed by: EMERGENCY MEDICINE

## 2024-02-01 PROCEDURE — 85610 PROTHROMBIN TIME: CPT

## 2024-02-01 PROCEDURE — 96374 THER/PROPH/DIAG INJ IV PUSH: CPT

## 2024-02-01 PROCEDURE — 25000003 PHARM REV CODE 250: Performed by: EMERGENCY MEDICINE

## 2024-02-01 PROCEDURE — 3079F DIAST BP 80-89 MM HG: CPT | Mod: CPTII,S$GLB,, | Performed by: STUDENT IN AN ORGANIZED HEALTH CARE EDUCATION/TRAINING PROGRAM

## 2024-02-01 PROCEDURE — 25000003 PHARM REV CODE 250

## 2024-02-01 PROCEDURE — 63600175 PHARM REV CODE 636 W HCPCS

## 2024-02-01 PROCEDURE — 87635 SARS-COV-2 COVID-19 AMP PRB: CPT | Performed by: EMERGENCY MEDICINE

## 2024-02-01 PROCEDURE — 93005 ELECTROCARDIOGRAM TRACING: CPT

## 2024-02-01 PROCEDURE — 99999 PR PBB SHADOW E&M-EST. PATIENT-LVL IV: CPT | Mod: PBBFAC,,, | Performed by: STUDENT IN AN ORGANIZED HEALTH CARE EDUCATION/TRAINING PROGRAM

## 2024-02-01 PROCEDURE — 4010F ACE/ARB THERAPY RXD/TAKEN: CPT | Mod: CPTII,S$GLB,, | Performed by: STUDENT IN AN ORGANIZED HEALTH CARE EDUCATION/TRAINING PROGRAM

## 2024-02-01 PROCEDURE — 1159F MED LIST DOCD IN RCRD: CPT | Mod: CPTII,S$GLB,, | Performed by: STUDENT IN AN ORGANIZED HEALTH CARE EDUCATION/TRAINING PROGRAM

## 2024-02-01 PROCEDURE — 87502 INFLUENZA DNA AMP PROBE: CPT

## 2024-02-01 PROCEDURE — 93010 ELECTROCARDIOGRAM REPORT: CPT | Mod: ,,, | Performed by: INTERNAL MEDICINE

## 2024-02-01 PROCEDURE — 96375 TX/PRO/DX INJ NEW DRUG ADDON: CPT

## 2024-02-01 PROCEDURE — 80053 COMPREHEN METABOLIC PANEL: CPT | Performed by: EMERGENCY MEDICINE

## 2024-02-01 PROCEDURE — 82550 ASSAY OF CK (CPK): CPT | Performed by: EMERGENCY MEDICINE

## 2024-02-01 RX ORDER — ALBUTEROL SULFATE 2.5 MG/.5ML
2.5 SOLUTION RESPIRATORY (INHALATION) EVERY 4 HOURS PRN
Qty: 25 EACH | Refills: 0 | Status: SHIPPED | OUTPATIENT
Start: 2024-02-01 | End: 2024-02-16

## 2024-02-01 RX ORDER — ONDANSETRON HYDROCHLORIDE 2 MG/ML
4 INJECTION, SOLUTION INTRAVENOUS
Status: DISCONTINUED | OUTPATIENT
Start: 2024-02-01 | End: 2024-02-01

## 2024-02-01 RX ORDER — ONDANSETRON 4 MG/1
4 TABLET, ORALLY DISINTEGRATING ORAL
Status: COMPLETED | OUTPATIENT
Start: 2024-02-01 | End: 2024-02-01

## 2024-02-01 RX ORDER — IPRATROPIUM BROMIDE AND ALBUTEROL SULFATE 2.5; .5 MG/3ML; MG/3ML
3 SOLUTION RESPIRATORY (INHALATION)
Status: COMPLETED | OUTPATIENT
Start: 2024-02-01 | End: 2024-02-01

## 2024-02-01 RX ORDER — BENZONATATE 200 MG/1
200 CAPSULE ORAL 3 TIMES DAILY PRN
Qty: 30 CAPSULE | Refills: 0 | Status: SHIPPED | OUTPATIENT
Start: 2024-02-01 | End: 2024-02-11

## 2024-02-01 RX ORDER — ONDANSETRON HYDROCHLORIDE 2 MG/ML
4 INJECTION, SOLUTION INTRAVENOUS
Status: COMPLETED | OUTPATIENT
Start: 2024-02-01 | End: 2024-02-01

## 2024-02-01 RX ORDER — DEXAMETHASONE SODIUM PHOSPHATE 4 MG/ML
6 INJECTION, SOLUTION INTRA-ARTICULAR; INTRALESIONAL; INTRAMUSCULAR; INTRAVENOUS; SOFT TISSUE
Status: COMPLETED | OUTPATIENT
Start: 2024-02-01 | End: 2024-02-01

## 2024-02-01 RX ORDER — ONDANSETRON 4 MG/1
4 TABLET, ORALLY DISINTEGRATING ORAL EVERY 6 HOURS PRN
Qty: 30 TABLET | Refills: 0 | Status: SHIPPED | OUTPATIENT
Start: 2024-02-01

## 2024-02-01 RX ADMIN — ONDANSETRON 4 MG: 4 TABLET, ORALLY DISINTEGRATING ORAL at 12:02

## 2024-02-01 RX ADMIN — IPRATROPIUM BROMIDE AND ALBUTEROL SULFATE 3 ML: 2.5; .5 SOLUTION RESPIRATORY (INHALATION) at 03:02

## 2024-02-01 RX ADMIN — SODIUM CHLORIDE 1000 ML: 9 INJECTION, SOLUTION INTRAVENOUS at 12:02

## 2024-02-01 RX ADMIN — ONDANSETRON 4 MG: 2 INJECTION INTRAMUSCULAR; INTRAVENOUS at 02:02

## 2024-02-01 RX ADMIN — DEXAMETHASONE SODIUM PHOSPHATE 6 MG: 4 INJECTION INTRA-ARTICULAR; INTRALESIONAL; INTRAMUSCULAR; INTRAVENOUS; SOFT TISSUE at 05:02

## 2024-02-01 NOTE — DISCHARGE INSTRUCTIONS
Please continue to take your Eliquis as prescribed.  Please return to the emergency department if you develop any chest pain, shortness of breath, or any other symptoms that concern you.  You are COVID positive and have a nonocclusive thrombosis in your L leg.

## 2024-02-01 NOTE — ED NOTES
O2 sat 99% room air, resp even and unlabored.  Pt c/o sob.  O2 per NC 2L placed for patient comfort.

## 2024-02-01 NOTE — PROGRESS NOTES
Hematology- Oncology Clinic Note :     2/1/2024    Chief Complaint   Patient presents with    Follow-up    Anticoagulation    Results         HPI  Pt is a 58 y.o. female who  has a past medical history of Asthma, Brain mass (2007), Depression, Fibromyalgia, Hypertension, and Seizures (2007)..  Who presents for follow up of DVT.  Pt recently presented to  ED after complaining of bilateral leg swelling 2 weeks duration.  CTA negative for PE but LLE US positive for non occlusive DVT.  Patient also states she had a knee replacement surgery one year ago and a few months later she had a blood clot. Patient further states she had an epidural injection last Friday prior to ED presentation. Patient denied recent flights or was on blood thinners. Even though clots most likely provoked since she has a hx of multiple clots hypercoag workup ordered and will benefit from lifelong eliquis. Bleeding precautions given and pt agreeable to continue eliquis.      Interval hx:    Hypercoag workup obtained and largely wnl and reviewed with pt.  Due to hx of multiple clots pt will need to be on lifelong anticoagulation.  Pt agreeable and bleeding precautions given.  Pt has endorsed worsening leg pain bilaterally, sore throat and intractable N/V over the past day and can't keep anything down.  Pt agreeable to go to ED for US and fluids and electrolyte check.        Active Problem List with Overview Notes    Diagnosis Date Noted    Left ureteral calculus 07/31/2020    Bacteremia due to Escherichia coli 07/19/2020    E. coli UTI 07/19/2020    Possible COVID-19 Infection 07/19/2020    Essential hypertension 07/19/2020    Seizure disorder 07/19/2020    Asthma 07/19/2020    MDD (major depressive disorder) 07/19/2020    Ureteral stone 07/19/2020    Chronic pain disorder 08/06/2018    Fibromyalgia 08/06/2018    Acute post-traumatic headache, not intractable     Nephrolithiasis 04/07/2015       Patient Active Problem List    Diagnosis Date Noted     Left ureteral calculus 2020    Bacteremia due to Escherichia coli 2020    E. coli UTI 2020    Possible COVID-19 Infection 2020    Essential hypertension 2020    Seizure disorder 2020    Asthma 2020    MDD (major depressive disorder) 2020    Ureteral stone 2020    Chronic pain disorder 2018    Fibromyalgia 2018    Acute post-traumatic headache, not intractable     Nephrolithiasis 2015     Past Medical History:   Diagnosis Date    Asthma     Brain mass     Depression     Fibromyalgia     Hypertension     Seizures     fell and hit head at grocery         Past Surgical History:   Procedure Laterality Date    CARPAL TUNNEL RELEASE      right     SECTION      x 3    cysto/ureteroscopy stent placement      CYSTOSCOPY W/ URETERAL STENT PLACEMENT Left 2020    Procedure: CYSTOSCOPY, WITH URETERAL STENT INSERTION;  Surgeon: Yessi Granados MD;  Location: [facility] OR;  Service: Urology;  Laterality: Left;    HYSTERECTOMY      RETROGRADE PYELOGRAPHY N/A 2020    Procedure: PYELOGRAM, RETROGRADE;  Surgeon: Yessi Granados MD;  Location: [facility] OR;  Service: Urology;  Laterality: N/A;    TONSILLECTOMY      URETEROSCOPIC REMOVAL OF URETERIC CALCULUS Left 2020    Procedure: Cystoscopy, possible retrograde pyelogram, ureteroscopy with laser lithotripsy, placement of ureteral stent;  Surgeon: Yessi Granados MD;  Location: [facility] OR;  Service: Urology;  Laterality: Left;  RN PREOP 2020---COVID NEGATIVE ON       (Not in a hospital admission)    Review of patient's allergies indicates:   Allergen Reactions    Penicillins Itching      Social History     Tobacco Use    Smoking status: Never    Smokeless tobacco: Never   Substance Use Topics    Alcohol use: Yes     Comment: occasionally      Family History   Problem Relation Age of Onset    Cancer Mother         Breast cancer    Heart disease Mother     Diabetes  Father     Cancer Maternal Grandmother         Lung Cancer    Cancer Maternal Grandfather         Lung Cancer    Cancer Paternal Grandmother     Cancer Paternal Grandfather         Review of Systems :  Review of Systems   Constitutional:  Negative for chills, fever, malaise/fatigue and weight loss.   HENT:  Negative for hearing loss.    Eyes:  Negative for blurred vision and pain.   Respiratory:  Negative for cough, sputum production and shortness of breath.    Cardiovascular:  Positive for leg swelling.   Gastrointestinal:  Positive for nausea and vomiting. Negative for constipation, diarrhea and heartburn.   Genitourinary:  Negative for dysuria and hematuria.   Musculoskeletal:  Positive for joint pain and myalgias.   Skin:  Negative for itching and rash.   Neurological:  Negative for dizziness and sensory change.   Endo/Heme/Allergies:  Does not bruise/bleed easily.   Psychiatric/Behavioral:  Negative for depression. The patient is not nervous/anxious.        Physical Exam :  Wt Readings from Last 3 Encounters:   12/29/23 72.6 kg (160 lb)   11/30/23 75.1 kg (165 lb 9.1 oz)   11/15/23 77.1 kg (170 lb)     Temp Readings from Last 3 Encounters:   12/29/23 98.1 °F (36.7 °C) (Oral)   11/15/23 98 °F (36.7 °C) (Oral)   07/31/20 97.8 °F (36.6 °C) (Oral)     BP Readings from Last 3 Encounters:   12/29/23 111/62   11/30/23 112/73   11/15/23 (!) 152/65     Pulse Readings from Last 3 Encounters:   12/29/23 68   11/30/23 94   11/15/23 73     There is no height or weight on file to calculate BMI.    Physical Exam  Vitals reviewed.   HENT:      Head: Normocephalic and atraumatic.      Nose: Nose normal.      Mouth/Throat:      Mouth: Mucous membranes are moist.   Eyes:      Pupils: Pupils are equal, round, and reactive to light.   Cardiovascular:      Rate and Rhythm: Normal rate and regular rhythm.   Pulmonary:      Effort: Pulmonary effort is normal.      Breath sounds: Normal breath sounds.   Abdominal:      General: Abdomen  is flat.   Musculoskeletal:         General: Normal range of motion.      Cervical back: Normal range of motion and neck supple.   Skin:     General: Skin is warm and dry.   Neurological:      Mental Status: She is alert. Mental status is at baseline.   Psychiatric:         Mood and Affect: Mood normal.         Behavior: Behavior normal.         Pertinent Diagnostic studies:    No results found for this or any previous visit (from the past 24 hour(s)).    Assessment/Plan :       LLE DVT/Hx of clots  -Past DVT most likely provoked by surgery  -Seen on recent ED visit after epidural, inactivity can contribute  -Nonocclusive deep venous thrombosis within the left femoral vein on US  -Has cardiology follow up  -hypercoag workup  unremarkable  -Would recommend lifelong eliquis, refills sent to pharmacy  -RTC in 3 months for MD visit and CBC      Intractable N/V worsening leg pain  -Over past day  -Pt appeared dehydrated and can't keep food or pills down  -Sent to ED for fluids/anti emetics and repeat LE US, ED notified       Lung nodule  -Seen on recent CTA  -Stable 1.2 cm right lower lobe pulmonary nodule, requiring no follow-up as appears stable compared to remote CT from 2015 and likely reflecting benign etiology per radiology   -Pt following with pulmonologist       Time spent on case: 30 minutes    Summary of orders placed this encounter:  No orders of the defined types were placed in this encounter.      No future appointments.            Kelin Mcdaniels MD   Hematology/oncology, [facility] [place]

## 2024-02-01 NOTE — ED NOTES
Pt with a history of HTN, Sz, brain mass and DVT to LLE pt presents w/ c/o sob.  Denies chest pain.  Pt is AAOx3, resp even and unlabored, skin warm and dry. HOB elevated, SR up x 2, call bell within reach. NAD noted.

## 2024-02-01 NOTE — ED PROVIDER NOTES
Encounter Date: 2024       History     Chief Complaint   Patient presents with    Leg Pain     Pt reports bilateral leg pain x 3 days. Reports having blood clot to L leg. Reports going to oncologist today and sent to er for further evaluation. Reports chronic sob, hx copd. Denies any recent travels. Reports cough, N/V. Reports being on blood thinners.      58-year-old female with a history of asthma, COPD, brain mass and seizures, fibromyalgia and hypertension with recently diagnosed DVTs presents to the emergency department having been transferred over from Hematology for concern of shortness of breath and right leg pain with a hematologist was concerned could be an additional DVT.  The patient attributes her shortness of breath to chronic COPD and asthma and has been afebrile and well feeling other than the leg pain and swelling which is onset similar to her left-sided leg pain for which she was diagnosed with a DVT.  She has been prescribed and has been taking Eliquis to treat the DVT.            Review of patient's allergies indicates:   Allergen Reactions    Penicillins Itching     Past Medical History:   Diagnosis Date    Asthma     Brain mass     Depression     Fibromyalgia     Hypertension     Seizures     fell and hit head at grocery        Past Surgical History:   Procedure Laterality Date    CARPAL TUNNEL RELEASE      right     SECTION      x 3    cysto/ureteroscopy stent placement      CYSTOSCOPY W/ URETERAL STENT PLACEMENT Left 2020    Procedure: CYSTOSCOPY, WITH URETERAL STENT INSERTION;  Surgeon: Yessi Granados MD;  Location: [facility] OR;  Service: Urology;  Laterality: Left;    HYSTERECTOMY      RETROGRADE PYELOGRAPHY N/A 2020    Procedure: PYELOGRAM, RETROGRADE;  Surgeon: Yessi Granados MD;  Location: [facility] OR;  Service: Urology;  Laterality: N/A;    TONSILLECTOMY      URETEROSCOPIC REMOVAL OF URETERIC CALCULUS Left 2020    Procedure: Cystoscopy, possible  retrograde pyelogram, ureteroscopy with laser lithotripsy, placement of ureteral stent;  Surgeon: Yessi Granados MD;  Location: [facility] OR;  Service: Urology;  Laterality: Left;  RN PREOP 7/29/2020---COVID NEGATIVE ON 7/29     Family History   Problem Relation Age of Onset    Cancer Mother         Breast cancer    Heart disease Mother     Diabetes Father     Cancer Maternal Grandmother         Lung Cancer    Cancer Maternal Grandfather         Lung Cancer    Cancer Paternal Grandmother     Cancer Paternal Grandfather      Social History     Tobacco Use    Smoking status: Never    Smokeless tobacco: Never   Substance Use Topics    Alcohol use: Yes     Comment: occasionally    Drug use: No     Review of Systems    Physical Exam     Initial Vitals   BP Pulse Resp Temp SpO2   02/01/24 1155 02/01/24 1155 02/01/24 1155 02/01/24 1244 02/01/24 1155   (!) 158/67 74 16 98.3 °F (36.8 °C) 99 %      MAP       --                Physical Exam    Vitals reviewed.  Constitutional: She appears well-developed and well-nourished. She is not diaphoretic. No distress.   HENT:   Head: Normocephalic and atraumatic.   Eyes: EOM are normal. Pupils are equal, round, and reactive to light.   Neck: Neck supple.   Normal range of motion.  Cardiovascular:  Normal rate, normal heart sounds and intact distal pulses.           Pulmonary/Chest: No respiratory distress. She has wheezes.   Abdominal: Abdomen is soft. She exhibits no distension. There is no abdominal tenderness. There is no rebound and no guarding.   Musculoskeletal:         General: Tenderness present. No edema. Normal range of motion.      Cervical back: Normal range of motion and neck supple.      Comments: Lower extremities diffusely tender to palpation bilaterally.  No swelling or rubor noted.  No focal tenderness noted.  Diffuse tenderness to palpation from the Achilles to her groin circumferentially bilaterally.     Neurological: She is alert and oriented to person, place,  and time. She has normal strength. GCS score is 15. GCS eye subscore is 4. GCS verbal subscore is 5. GCS motor subscore is 6.   Skin: Skin is warm and dry.   Psychiatric: She has a normal mood and affect. Her behavior is normal. Judgment and thought content normal.         ED Course   Procedures  Labs Reviewed   COMPREHENSIVE METABOLIC PANEL - Abnormal; Notable for the following components:       Result Value    Chloride 111 (*)     All other components within normal limits   SARS-COV-2 RDRP GENE - Abnormal; Notable for the following components:    POC Rapid COVID Positive (*)     All other components within normal limits   CBC W/ AUTO DIFFERENTIAL   CK   PROTIME-INR   POCT INFLUENZA A/B MOLECULAR          Imaging Results               US Lower Extremity Veins Bilateral (Final result)  Result time 02/01/24 16:04:41      Final result by Mateo Vaughn MD (02/01/24 16:04:41)                   Impression:      Unchanged appearance of nonocclusive thrombus within the proximal to mid aspect of the left femoral vein.    This report was flagged in Epic as abnormal.      Electronically signed by: Mateo Vaughn MD  Date:    02/01/2024  Time:    16:04               Narrative:    EXAMINATION:  US LOWER EXTREMITY VEINS BILATERAL    CLINICAL HISTORY:  Localized edema    TECHNIQUE:  Duplex and color flow Doppler and dynamic compression was performed of the bilateral lower extremity veins was performed.  The examination was performed via limited COVID protocol.    COMPARISON:  12/29/2023.    FINDINGS:  Right thigh veins: The common femoral, femoral, popliteal, upper greater saphenous, and deep femoral veins are patent and free of thrombus. The veins are normally compressible and have normal phasic flow and augmentation response.    Right calf veins: The visualized calf veins are patent.    Left thigh veins: The common femoral, popliteal, upper greater saphenous, and deep femoral veins are patent and free of thrombus.  There is  unchanged appearance of nonocclusive thrombus within the proximal to mid aspect of the left femoral vein.    Left calf veins: The visualized calf veins are patent.    Miscellaneous: None                                       X-Ray Chest AP Portable (Final result)  Result time 02/01/24 13:02:24      Final result by Freddie Meeks MD (02/01/24 13:02:24)                   Impression:      1. No acute cardiopulmonary process.      Electronically signed by: Freddie Meeks MD  Date:    02/01/2024  Time:    13:02               Narrative:    EXAMINATION:  XR CHEST AP PORTABLE    CLINICAL HISTORY:  Chest Pain;    TECHNIQUE:  Single frontal view of the chest was performed.    COMPARISON:  12/29/2023    FINDINGS:  The cardiomediastinal silhouette is not enlarged noting calcification of the aorta..  There is no pleural effusion.  The trachea is midline.  The lungs are symmetrically expanded bilaterally with coarse interstitial attenuation.  No large focal consolidation seen.  There is no pneumothorax.  The osseous structures are remarkable for degenerative change..                                       Medications   dexAMETHasone injection 6 mg (has no administration in time range)   albuterol-ipratropium 2.5 mg-0.5 mg/3 mL nebulizer solution 3 mL (3 mLs Nebulization Given 2/1/24 1544)   sodium chloride 0.9% bolus 1,000 mL 1,000 mL (1,000 mLs Intravenous New Bag 2/1/24 1257)   ondansetron disintegrating tablet 4 mg (4 mg Oral Given 2/1/24 1244)   ondansetron injection 4 mg (4 mg Intravenous Given 2/1/24 1425)     Medical Decision Making  58-year-old female with chief complaint shortness of breath and leg pain.  Patient is hemodynamically stable and in no acute distress.  Differential diagnosis includes but is not limited to PE, viral infection, new DVT.  The patient was swabbed and found to be positive for COVID.  Additionally, a DVT ultrasound study of her right and left legs were conducted.  They concluded that the left  leg had an unchanged nonocclusive thrombus of her femoral vein.  Her right lower extremity was free from apparent thrombus.  Patient's shortness of breath and wheezing resolved after 3 doses of DuoNebs.  We discussed using Paxlovid with the patient.  However, Paxlovid is contraindicated for use with Eliquis.  We collectively determined that the patient we see greater benefit from remaining on Eliquis then for being treated with Paxlovid.  We did provide 6 mg of Decadron for symptomatic relief with a positive affect.  Final diagnosis is upper respiratory infection due to COVID-19 and confirmation of DVT in her left femoral vein.  The patient will be discharged with return precautions and recommendation to continue taking her Eliquis as prescribed.    Amount and/or Complexity of Data Reviewed  Labs: ordered. Decision-making details documented in ED Course.  Radiology: ordered and independent interpretation performed. Decision-making details documented in ED Course.  ECG/medicine tests: ordered and independent interpretation performed. Decision-making details documented in ED Course.  Discussion of management or test interpretation with external provider(s): I discussed his case with the hematologist.  We have ordered the studies which he would recommended.  Those include CPK, DVT studies, viral swabs.    Risk  Prescription drug management.               ED Course as of 02/01/24 1655   Thu Feb 01, 2024   1257 COVID is positive [MH]   1258 My independent interpretation of the EKG is normal sinus rhythm at a rate of 72.  QT corrected is normal.  There is no ST segment elevation or depression concerning for infarct.  When compared to EKGs performed outside of the ED there has no acute changes [MH]   1344 CPK and PT INR within normal limits.  CBC is unremarkable.  CMP is unremarkable.  Flu negative.   [VC]   1346 My independent interpretation of the chest x-ray is that there was no pneumothorax, pleural effusion, or  consolidation. [VC]   1350 Pt with hx DVT, here from clinic with leg pain. Also cough, congestion, n/v.   []   1450 Chart review reveals that the patient has been instructed to be on long-term/lifelong Eliquis.  Although we considered starting paxlovid are review reveals that paxlovid and Eliquis are contraindicated together and we feel she is at higher risk for problems if she does not take her Eliquis.  We discussed this at length with the patient and she is in agreement []   1505 Preliminary read on the ultrasound: There appears to be a noncompressible saphenous vein consistent with DVT on the left []   1623 Final read: Unchanged appearance of nonocclusive thrombus within the proximal to mid aspect of the left femoral vein.      []      ED Course User Index  [] Aniceto Sotelo MD  [] Jeffrey Yusuf MD                           Clinical Impression:  Final diagnoses:  [R07.9] Chest pain  [R60.0] Lower extremity edema  [I82.412] DVT of deep femoral vein, left  [U07.1] COVID-19 (Primary)  [U07.1] COVID  [J98.01] Bronchospasm          ED Disposition Condition    Discharge Stable          ED Prescriptions       Medication Sig Dispense Start Date End Date Auth. Provider    albuterol sulfate 2.5 mg/0.5 mL Nebu Take 2.5 mg by nebulization every 4 (four) hours as needed (wheeze). Rescue 25 each 2/1/2024 2/16/2024 Aniceto Sotelo MD          Follow-up Information    None          Jeffrey Yusuf MD  Resident  02/01/24 2720

## 2024-02-02 ENCOUNTER — TELEPHONE (OUTPATIENT)
Dept: HEMATOLOGY/ONCOLOGY | Facility: CLINIC | Age: 59
End: 2024-02-02
Payer: COMMERCIAL

## 2024-02-02 NOTE — TELEPHONE ENCOUNTER
Tc from  patient stating she was seen in EP and told she had blood clots in both legs, Covid 19 and regular Covid and spasms in her bronchial tubes, CP   Advised her they we do not address Covid, Cp bronchial spasms but I will assist with any questions she has re: blood clots She stated she was told that she has blood clots in both legs and that her daughter who is a nurse is demanding a hematologist figure out what type of disease she has because she must have a blood disorder because she has developed clots          Message routed to Dr Muñoz for further recommendations  Per Dr muñoz he requested I advise patient that per imaging indicates she has a stable clot in her left leg  but that the imaging does not indicate that she has a new clot in the right leg and that all her test thus far do no indicate any abnormality but we can offer her a second opinion [place] [facility] if [place] would like.  Tc to patient to advise of above She stated she has  a blood clot in both legs and I have already had a second opinion form the ER last night  She proceeded to inform nurse that the head nurse of the ER and the head doctor in the ER came to see her and they both informed her she had blood clots in both legs She proceeded to inform nurse that she had 2 covids  Cp & bronchial spasms. Again nurse reminded her that we do not address those issues but from our prospective her imaging indicates that she has one the stable clot in the left leg She continued to argue with nurse that she has clots in both legs    Nurse asked if she had reviewed the message on the portal for the imaging results  She said NO that she is going off papers she was given in ER and it says she has clots in both legs  and the head nurse and her ER doctor told her she had them.  Again she tells nurse she has clots in both legs and that her daughter a nurse states she has to have a disease and someone needs to figure it out  Nurse suggested she review the imaging in the  portal for her ultrasounds and contact the head nurse and head ER physician that she was seen by last night and ask them to review their findings with her and to explain the results to her again. Offered her another opinion at [facility] and she said the ER was her second opinion. She stated she would contact ER and ask to speak to head nurse and head doctor from last next but she knows she has clots in both legs.

## 2024-03-06 ENCOUNTER — TELEPHONE (OUTPATIENT)
Dept: HEMATOLOGY/ONCOLOGY | Facility: CLINIC | Age: 59
End: 2024-03-06
Payer: COMMERCIAL

## 2024-03-06 ENCOUNTER — OFFICE VISIT (OUTPATIENT)
Dept: CARDIOLOGY | Facility: CLINIC | Age: 59
End: 2024-03-06
Payer: COMMERCIAL

## 2024-03-06 VITALS
SYSTOLIC BLOOD PRESSURE: 126 MMHG | OXYGEN SATURATION: 95 % | RESPIRATION RATE: 15 BRPM | WEIGHT: 176.38 LBS | HEART RATE: 105 BPM | BODY MASS INDEX: 30.11 KG/M2 | DIASTOLIC BLOOD PRESSURE: 62 MMHG | HEIGHT: 64 IN

## 2024-03-06 DIAGNOSIS — I82.402 LEG DVT (DEEP VENOUS THROMBOEMBOLISM), ACUTE, LEFT: ICD-10-CM

## 2024-03-06 DIAGNOSIS — Z79.01 ANTICOAGULATED: ICD-10-CM

## 2024-03-06 PROCEDURE — 3078F DIAST BP <80 MM HG: CPT | Mod: CPTII,S$GLB,, | Performed by: INTERNAL MEDICINE

## 2024-03-06 PROCEDURE — 99204 OFFICE O/P NEW MOD 45 MIN: CPT | Mod: S$GLB,,, | Performed by: INTERNAL MEDICINE

## 2024-03-06 PROCEDURE — 99999 PR PBB SHADOW E&M-EST. PATIENT-LVL V: CPT | Mod: PBBFAC,,, | Performed by: INTERNAL MEDICINE

## 2024-03-06 PROCEDURE — 3074F SYST BP LT 130 MM HG: CPT | Mod: CPTII,S$GLB,, | Performed by: INTERNAL MEDICINE

## 2024-03-06 PROCEDURE — 1159F MED LIST DOCD IN RCRD: CPT | Mod: CPTII,S$GLB,, | Performed by: INTERNAL MEDICINE

## 2024-03-06 PROCEDURE — 3008F BODY MASS INDEX DOCD: CPT | Mod: CPTII,S$GLB,, | Performed by: INTERNAL MEDICINE

## 2024-03-06 PROCEDURE — 4010F ACE/ARB THERAPY RXD/TAKEN: CPT | Mod: CPTII,S$GLB,, | Performed by: INTERNAL MEDICINE

## 2024-03-06 NOTE — TELEPHONE ENCOUNTER
Patient called to request refill on her eliquis. Informed her she has refills available at her pharmacy but patient wants them to be sent to [facility] because they are cheaper there than [facility].

## 2024-03-06 NOTE — PROGRESS NOTES
CARDIOVASCULAR CONSULTATION    REASON FOR CONSULT:   Myra Harrison is a 58 y.o. female who presents for   Chief Complaint   Patient presents with    Consult     HISTORY OF PRESENT ILLNESS:     Patient is a pleasant 58-year-old lady.  Had knee surgery done last year.  After that DVT of left femoral vein.  Has had multiple ultrasounds done since then which have revealed partially occlusive DVT of left femoral vein, including 1 done recently at [facility].  She has musculoskeletal myalgias and bilateral leg pains which occur from the thighs all the way down to the feet.  No particular aggravating or relieving factors.  She has brought a she would from her primary care physician requesting a CTA angiogram of the legs to check for circulation.  She did have CT angiogram of her lungs performed in November which did not reveal any pulmonary embolism.  Denies orthopnea PND.  Does have chronic cough.      Impression:     1. No evidence of PE.  2. No acute intrathoracic abnormalities identified.  3. Stable 1.2 cm right lower lobe pulmonary nodule, requiring no follow-up as appears stable compared to remote CT from 2015 and likely reflecting benign etiology.    Impression:     Suspected ongoing nonocclusive thrombus in the left femoral vein.  No DVT in the right lower extremity.       Impression:     Unchanged appearance of nonocclusive thrombus within the proximal to mid aspect of the left femoral vein.        CONCLUSIONS     Normal left ventricular systolic function.     Calculated left ventricular ejection fraction by 2D tracking is 60 %.     Normal diastolic function.     There were no regional wall motion abnormalities.     Indeterminate right ventricular systolic function.       - TAPSE 12.4mm, S' 10.3cm/s.       - Normal Right ventricular systolic pressure 26.2 mmHg.     Normal atrial dimensions.     Normal cardiac valve structures.     Normal doppler study - trace MR, TR, [place] only.    Findings:     Right  CFV: Patent   Right GSV: Patent   Right PFV: Patent   Right SFV Prox: Patent   Right SFV Mid: Patent   Right SFV Distal: Patent   Right Popliteal: Patent   Right Peroneal: Patent   Right PTV: Patent     Left CFV: Patent   Left GSV: Patent   Left PFV: Patent   Left SFV Prox: Partially Occluded   Left SFV Mid: Patent   Left SFV Distal: Patent   Left Popliteal: Patent   Left Peroneal: Patent   Left PTV: Patent      PAST MEDICAL HISTORY:     Past Medical History:   Diagnosis Date    Asthma     Brain mass     Depression     Fibromyalgia     Hypertension     Seizures     fell and hit head at grocery          PAST SURGICAL HISTORY:     Past Surgical History:   Procedure Laterality Date    CARPAL TUNNEL RELEASE      right     SECTION      x 3    cysto/ureteroscopy stent placement      CYSTOSCOPY W/ URETERAL STENT PLACEMENT Left 2020    Procedure: CYSTOSCOPY, WITH URETERAL STENT INSERTION;  Surgeon: Yessi Granados MD;  [facility];  Service: Urology;  Laterality: Left;    HYSTERECTOMY      RETROGRADE PYELOGRAPHY N/A 2020    Procedure: PYELOGRAM, RETROGRADE;  Surgeon: Yessi Granados MD;  [facility];  Service: Urology;  Laterality: N/A;    TONSILLECTOMY      URETEROSCOPIC REMOVAL OF URETERIC CALCULUS Left 2020    Procedure: Cystoscopy, possible retrograde pyelogram, ureteroscopy with laser lithotripsy, placement of ureteral stent;  Surgeon: Yessi Granados MD;  [facility];  Service: Urology;  Laterality: Left;  RN PREOP 2020---COVID NEGATIVE ON            SOCIAL HISTORY:     Social History     Socioeconomic History    Marital status:    Tobacco Use    Smoking status: Never    Smokeless tobacco: Never   Substance and Sexual Activity    Alcohol use: Yes     Comment: occasionally    Drug use: No    Sexual activity: Yes       FAMILY HISTORY:     Family History   Problem Relation Age of Onset    Cancer Mother         Breast cancer    Heart  "disease Mother     Diabetes Father     Cancer Maternal Grandmother         Lung Cancer    Cancer Maternal Grandfather         Lung Cancer    Cancer Paternal Grandmother     Cancer Paternal Grandfather        REVIEW OF SYSTEMS:   Review of Systems   Constitutional: Negative.   HENT: Negative.     Eyes: Negative.    Cardiovascular: Negative.    Respiratory: Negative.     Endocrine: Negative.    Hematologic/Lymphatic: Negative.    Skin: Negative.    Musculoskeletal:  Positive for joint pain and myalgias.   Gastrointestinal: Negative.    Genitourinary: Negative.    Neurological: Negative.    Psychiatric/Behavioral: Negative.     Allergic/Immunologic: Negative.        A 10 point review of systems was performed and all the pertinent positives have been mentioned. Rest of review of systems was negative.        PHYSICAL EXAM:     Vitals:    03/06/24 1508   BP: 126/62   Pulse: 105   Resp: 15    Body mass index is 30.27 kg/m².  Weight: 80 kg (176 lb 5.9 oz)   Height: 5' 4" (162.6 cm)     Physical Exam  Constitutional:       Appearance: Normal appearance. She is well-developed.   HENT:      Head: Normocephalic.   Eyes:      Pupils: Pupils are equal, round, and reactive to light.   Cardiovascular:      Rate and Rhythm: Normal rate and regular rhythm.   Pulmonary:      Effort: Pulmonary effort is normal.      Breath sounds: Normal breath sounds.   Abdominal:      General: Bowel sounds are normal.      Palpations: Abdomen is soft.      Tenderness: There is no abdominal tenderness.   Musculoskeletal:         General: Normal range of motion.      Cervical back: Normal range of motion and neck supple.   Skin:     General: Skin is warm.   Neurological:      Mental Status: She is alert and oriented to person, place, and time.           DATA:     Laboratory:  CBC:  Recent Labs   Lab 11/15/23  1657 12/29/23  1128 02/01/24  1244   WBC 11.70 6.33 5.90   Hemoglobin 15.3 13.8 13.7   Hematocrit 45.3 43.0 42.7   Platelets 291 245 263 " "      CHEMISTRIES:  Recent Labs   Lab 08/31/22  1155 06/13/23  1143 11/15/23  1657 12/29/23  1128 02/01/24  1244   Glucose  --   --  110 93 98   Sodium 140 134 L 140 141 144   Potassium 3.7 3.7 4.0 4.2 4.3   BUN 15.9 20.5 H 19 10 13   Creatinine 0.88 0.99 0.9 0.8 0.7   eGFR  85 L 67 L  --   --   --    Calcium 10.4 H 10.2 H 10.2 10.2 9.5       CARDIAC BIOMARKERS:  Recent Labs   Lab 11/15/23  1657 12/29/23  1128 02/01/24  1244   CPK  --   --  47   Troponin I <0.006 <0.006  --        COAGS:  Recent Labs   Lab 02/01/24  1244   INR 1.0       LIPIDS/LFTS:  Recent Labs   Lab 11/15/23  1657 12/29/23  1128 02/01/24  1244   AST 13 17 21   ALT 13 16 19       No results found for: "HGBA1C"    TSH        The ASCVD Risk score (Camryn DK, et al., 2019) failed to calculate for the following reasons:    Cannot find a previous HDL lab    Cannot find a previous total cholesterol lab    The smoking status is invalid       BNP    Lab Results   Component Value Date/Time    BNP 19 12/29/2023 11:28 AM    BNP 19 11/15/2023 04:57 PM            ECHO    Results for orders placed during the hospital encounter of 07/19/20    Echo Color Flow Doppler? Yes    Interpretation Summary  · Normal left ventricular systolic function. The estimated ejection fraction is 55%.  · Normal LV diastolic function.  · Normal right ventricular systolic function.  · No pulmonary hypertension present.      STRESS TEST    No results found for this or any previous visit.        CATH    No results found for this or any previous visit.              ASSESSMENT AND PLAN     Patient Active Problem List   Diagnosis    Nephrolithiasis    Acute post-traumatic headache, not intractable    Chronic pain disorder    Fibromyalgia    Bacteremia due to Escherichia coli    E. coli UTI    Possible COVID-19 Infection    Essential hypertension    Seizure disorder    Asthma    MDD (major depressive disorder)    Ureteral stone    Left ureteral calculus         ALLERGIES AND " MEDICATION:     Review of patient's allergies indicates:   Allergen Reactions    Penicillins Itching        Medication List            Accurate as of March 6, 2024  3:59 PM. If you have any questions, ask your nurse or doctor.                CONTINUE taking these medications      albuterol sulfate 2.5 mg/0.5 mL Nebu  Take 2.5 mg by nebulization every 4 (four) hours as needed (wheeze). Rescue     apixaban 5 mg Tab  Commonly known as: ELIQUIS  Take 1 tablet (5 mg total) by mouth 2 (two) times daily.     ergocalciferol 50,000 unit Cap  Commonly known as: ERGOCALCIFEROL     gabapentin 300 MG capsule  Commonly known as: NEURONTIN     ondansetron 4 MG tablet  Commonly known as: ZOFRAN  Take 1 tablet (4 mg total) by mouth every 8 (eight) hours as needed.     * ondansetron 4 MG Tbdl  Commonly known as: ZOFRAN-ODT  Take 1 tablet (4 mg total) by mouth every 6 (six) hours as needed (Nausea).     * ondansetron 4 MG Tbdl  Commonly known as: ZOFRAN-ODT  Take 1 tablet (4 mg total) by mouth every 6 (six) hours as needed.     oxybutynin 5 MG Tab  Commonly known as: DITROPAN  Take 1 tablet (5 mg total) by mouth 3 (three) times daily as needed (bladder spasms).     pantoprazole 20 MG tablet  Commonly known as: PROTONIX  Take 1 tablet (20 mg total) by mouth once daily.     triamterene-hydrochlorothiazide 37.5-25 mg 37.5-25 mg per capsule  Commonly known as: DYAZIDE  Take 1 capsule by mouth once daily.           * This list has 2 medication(s) that are the same as other medications prescribed for you. Read the directions carefully, and ask your doctor or other care provider to review them with you.                   Where to Get Your Medications        These medications were sent to [facility] [id number] [id number]      Hours: Mon-Fri, 8a-5:30p Phone: [phone]   apixaban 5 mg Tab         Orders Placed This Encounter   Procedures    Ankle Brachial Indices (GRACIELA)    CV Ultrasound doppler  arterial legs bilat       Left lower extremity nonocclusive femoral vein DVT.  Her symptoms are bilateral.  Unlikely that they are coming from this DVT.  Reassurance given.  On Eliquis already.  Further management per Dr. Mcdaniels  No indication for thrombectomy    Patient is requesting that circulation be checked.  I will order ABIs at rest and exercise as well as peripheral ultrasound to rule out any arterial disease.  Again her symptoms are very atypical and unlikely to represent claudication    Follow-up after testing      Thank you very much for involving me in the care of your patient.  Please do not hesitate to contact me if there are any questions.      Candelario Chauhan MD, FACC, [facility]  Interventional Cardiologist, [facility].           This note was dictated with the help of speech recognition software.  There might be un-intended errors and/or substitutions.

## 2024-04-16 ENCOUNTER — HOSPITAL ENCOUNTER (OUTPATIENT)
Dept: CARDIOLOGY | Facility: HOSPITAL | Age: 59
Discharge: HOME | End: 2024-04-16
Attending: INTERNAL MEDICINE
Payer: COMMERCIAL

## 2024-04-16 DIAGNOSIS — I82.402 LEG DVT (DEEP VENOUS THROMBOEMBOLISM), ACUTE, LEFT: ICD-10-CM

## 2024-04-16 DIAGNOSIS — Z79.01 ANTICOAGULATED: ICD-10-CM

## 2024-04-16 LAB
LEFT ANT TIBIAL SYS PSV: 102 CM/S
LEFT ARM BP: 151 MMHG
LEFT CFA PSV: 127 CM/S
LEFT EXTERNAL ILIAC PSV: 141 CM/S
LEFT PERONEAL SYS PSV: 44 CM/S
LEFT POPLITEAL PSV: 72 CM/S
LEFT POST TIBIAL SYS PSV: 83 CM/S
LEFT PROFUNDA SYS PSV: 85 CM/S
LEFT SUPER FEMORAL DIST SYS PSV: 91 CM/S
LEFT SUPER FEMORAL MID SYS PSV: 85 CM/S
LEFT SUPER FEMORAL OSTIAL SYS PSV: 132 CM/S
LEFT SUPER FEMORAL PROX SYS PSV: 117 CM/S
LEFT TBI: 0.92
LEFT TIB/PER TRUNK SYS PSV: 85 CM/S
LEFT TOE PRESSURE: 139 MMHG
RIGHT ANT TIBIAL SYS PSV: 73 CM/S
RIGHT ARM BP: 149 MMHG
RIGHT CFA PSV: 100 CM/S
RIGHT EXTERNAL ILLIAC PSV: 126 CM/S
RIGHT PERONEAL SYS PSV: 69 CM/S
RIGHT POPLITEAL PSV: 69 CM/S
RIGHT POST TIBIAL SYS PSV: 83 CM/S
RIGHT PROFUNDA SYS PSV: 63 CM/S
RIGHT SUPER FEMORAL DIST SYS PSV: 82 CM/S
RIGHT SUPER FEMORAL MID SYS PSV: 93 CM/S
RIGHT SUPER FEMORAL OSTIAL SYS PSV: 101 CM/S
RIGHT SUPER FEMORAL PROX SYS PSV: 111 CM/S
RIGHT TBI: 0.97
RIGHT TIB/PER TRUNK SYS PSV: 76 CM/S
RIGHT TOE PRESSURE: 147 MMHG

## 2024-04-16 PROCEDURE — 93998 UNLISTD NONINVAS VASC DX STD: CPT

## 2024-04-16 PROCEDURE — 93925 LOWER EXTREMITY STUDY: CPT | Mod: 26,,, | Performed by: INTERNAL MEDICINE

## 2024-04-16 PROCEDURE — 93998 UNLISTD NONINVAS VASC DX STD: CPT | Mod: ,,, | Performed by: INTERNAL MEDICINE

## 2024-04-16 PROCEDURE — 93925 LOWER EXTREMITY STUDY: CPT

## 2024-04-16 PROCEDURE — 93922 UPR/L XTREMITY ART 2 LEVELS: CPT

## 2024-04-24 ENCOUNTER — OFFICE VISIT (OUTPATIENT)
Dept: CARDIOLOGY | Facility: CLINIC | Age: 59
End: 2024-04-24
Payer: COMMERCIAL

## 2024-04-24 VITALS
OXYGEN SATURATION: 96 % | SYSTOLIC BLOOD PRESSURE: 126 MMHG | HEART RATE: 110 BPM | BODY MASS INDEX: 28.64 KG/M2 | RESPIRATION RATE: 15 BRPM | HEIGHT: 64 IN | DIASTOLIC BLOOD PRESSURE: 74 MMHG | WEIGHT: 167.75 LBS

## 2024-04-24 DIAGNOSIS — I82.402 LEG DVT (DEEP VENOUS THROMBOEMBOLISM), ACUTE, LEFT: Primary | ICD-10-CM

## 2024-04-24 PROCEDURE — 3078F DIAST BP <80 MM HG: CPT | Mod: CPTII,S$GLB,, | Performed by: INTERNAL MEDICINE

## 2024-04-24 PROCEDURE — 1159F MED LIST DOCD IN RCRD: CPT | Mod: CPTII,S$GLB,, | Performed by: INTERNAL MEDICINE

## 2024-04-24 PROCEDURE — 3074F SYST BP LT 130 MM HG: CPT | Mod: CPTII,S$GLB,, | Performed by: INTERNAL MEDICINE

## 2024-04-24 PROCEDURE — 99214 OFFICE O/P EST MOD 30 MIN: CPT | Mod: S$GLB,,, | Performed by: INTERNAL MEDICINE

## 2024-04-24 PROCEDURE — 3008F BODY MASS INDEX DOCD: CPT | Mod: CPTII,S$GLB,, | Performed by: INTERNAL MEDICINE

## 2024-04-24 PROCEDURE — 99999 PR PBB SHADOW E&M-EST. PATIENT-LVL IV: CPT | Mod: PBBFAC,,, | Performed by: INTERNAL MEDICINE

## 2024-04-24 PROCEDURE — 4010F ACE/ARB THERAPY RXD/TAKEN: CPT | Mod: CPTII,S$GLB,, | Performed by: INTERNAL MEDICINE

## 2024-04-24 NOTE — PROGRESS NOTES
CARDIOVASCULAR CONSULTATION    REASON FOR CONSULT:   Myra Harrison is a 59 y.o. female who presents for   Chief Complaint   Patient presents with    Follow-up    Results     HISTORY OF PRESENT ILLNESS:     Patient is a pleasant 58-year-old lady.  Had knee surgery done last year.  After that DVT of left femoral vein.  Has had multiple ultrasounds done since then which have revealed partially occlusive DVT of left femoral vein, including 1 done recently at [facility].  She has musculoskeletal myalgias and bilateral leg pains which occur from the thighs all the way down to the feet.  No particular aggravating or relieving factors.  She has brought a she would from her primary care physician requesting a CTA angiogram of the legs to check for circulation.  She did have CT angiogram of her lungs performed in November which did not reveal any pulmonary embolism.  Denies orthopnea PND.  Does have chronic cough.      Impression:     1. No evidence of PE.  2. No acute intrathoracic abnormalities identified.  3. Stable 1.2 cm right lower lobe pulmonary nodule, requiring no follow-up as appears stable compared to remote CT from 2015 and likely reflecting benign etiology.    Impression:     Suspected ongoing nonocclusive thrombus in the left femoral vein.  No DVT in the right lower extremity.       Impression:     Unchanged appearance of nonocclusive thrombus within the proximal to mid aspect of the left femoral vein.        CONCLUSIONS     Normal left ventricular systolic function.     Calculated left ventricular ejection fraction by 2D tracking is 60 %.     Normal diastolic function.     There were no regional wall motion abnormalities.     Indeterminate right ventricular systolic function.       - TAPSE 12.4mm, S' 10.3cm/s.       - Normal Right ventricular systolic pressure 26.2 mmHg.     Normal atrial dimensions.     Normal cardiac valve structures.     Normal doppler study - trace MR, TR, [place] only.    Findings:      Right CFV: Patent   Right GSV: Patent   Right PFV: Patent   Right SFV Prox: Patent   Right SFV Mid: Patent   Right SFV Distal: Patent   Right Popliteal: Patent   Right Peroneal: Patent   Right PTV: Patent     Left CFV: Patent   Left GSV: Patent   Left PFV: Patent   Left SFV Prox: Partially Occluded   Left SFV Mid: Patent   Left SFV Distal: Patent   Left Popliteal: Patent   Left Peroneal: Patent   Left PTV: Patent      Notes from 2024: Patient here for follow-up.  Denies any chest pains at rest on exertion, orthopnea, PND.  Has been doing fine.  Main complaint is chronic dyspnea on exertion which has been going on for many years      2021 Dobutamine Stress Test  Findings:   The left ventricle is normal in size.   Post stress SPECT images demonstrate normal perfusion in all regions. The rest images reveal no reversibility. Gated SPECT imaging demonstrates normal wall motion in all regions with a left ventricular ejection fraction estimated to be 68.00%.      Impression:  Stress ECG portion was negative for ischemia.   The overall study quality is good.   Normal perfusion scan with no evidence of inducible ischemia or areas of infarct      There is no prior study for comparison      PAST MEDICAL HISTORY:     Past Medical History:   Diagnosis Date    Asthma     Brain mass     Depression     Fibromyalgia     Hypertension     Seizures 2007    fell and hit head at grocery          PAST SURGICAL HISTORY:     Past Surgical History:   Procedure Laterality Date    CARPAL TUNNEL RELEASE      right     SECTION      x 3    cysto/ureteroscopy stent placement      CYSTOSCOPY W/ URETERAL STENT PLACEMENT Left 2020    Procedure: CYSTOSCOPY, WITH URETERAL STENT INSERTION;  Surgeon: Yessi Granados MD;  Location: [facility] OR;  Service: Urology;  Laterality: Left;    HYSTERECTOMY      RETROGRADE PYELOGRAPHY N/A 2020    Procedure: PYELOGRAM, RETROGRADE;  Surgeon: Yessi Granados MD;  Location:  "[facility] OR;  Service: Urology;  Laterality: N/A;    TONSILLECTOMY      URETEROSCOPIC REMOVAL OF URETERIC CALCULUS Left 7/31/2020    Procedure: Cystoscopy, possible retrograde pyelogram, ureteroscopy with laser lithotripsy, placement of ureteral stent;  Surgeon: Yessi Granados MD;  Location: [facility] OR;  Service: Urology;  Laterality: Left;  RN PREOP 7/29/2020---COVID NEGATIVE ON 7/29           SOCIAL HISTORY:     Social History     Socioeconomic History    Marital status:    Tobacco Use    Smoking status: Never    Smokeless tobacco: Never   Substance and Sexual Activity    Alcohol use: Yes     Comment: occasionally    Drug use: No    Sexual activity: Yes       FAMILY HISTORY:     Family History   Problem Relation Name Age of Onset    Cancer Mother          Breast cancer    Heart disease Mother      Diabetes Father      Cancer Maternal Grandmother          Lung Cancer    Cancer Maternal Grandfather          Lung Cancer    Cancer Paternal Grandmother      Cancer Paternal Grandfather         REVIEW OF SYSTEMS:   Review of Systems   Constitutional: Negative.   HENT: Negative.     Eyes: Negative.    Cardiovascular: Negative.    Respiratory: Negative.     Endocrine: Negative.    Hematologic/Lymphatic: Negative.    Skin: Negative.    Musculoskeletal:  Positive for joint pain and myalgias.   Gastrointestinal: Negative.    Genitourinary: Negative.    Neurological: Negative.    Psychiatric/Behavioral: Negative.     Allergic/Immunologic: Negative.        A 10 point review of systems was performed and all the pertinent positives have been mentioned. Rest of review of systems was negative.        PHYSICAL EXAM:     Vitals:    04/24/24 1526   BP: 126/74   Pulse: 110   Resp: 15    Body mass index is 28.8 kg/m².  Weight: 76.1 kg (167 lb 12.3 oz)   Height: 5' 4" (162.6 cm)     Physical Exam  Constitutional:       Appearance: Normal appearance. She is well-developed.   HENT:      Head: Normocephalic.   Eyes:      Pupils: " "Pupils are equal, round, and reactive to light.   Cardiovascular:      Rate and Rhythm: Normal rate and regular rhythm.   Pulmonary:      Effort: Pulmonary effort is normal.      Breath sounds: Normal breath sounds.   Abdominal:      General: Bowel sounds are normal.      Palpations: Abdomen is soft.      Tenderness: There is no abdominal tenderness.   Musculoskeletal:         General: Normal range of motion.      Cervical back: Normal range of motion and neck supple.   Skin:     General: Skin is warm.   Neurological:      Mental Status: She is alert and oriented to person, place, and time.           DATA:     Laboratory:  CBC:  Recent Labs   Lab 11/15/23  1657 12/29/23  1128 02/01/24  1244   WBC 11.70 6.33 5.90   Hemoglobin 15.3 13.8 13.7   Hematocrit 45.3 43.0 42.7   Platelets 291 245 263       CHEMISTRIES:  Recent Labs   Lab 08/31/22  1155 06/13/23  1143 11/15/23  1657 12/29/23  1128 02/01/24  1244   Glucose  --   --  110 93 98   Sodium 140 134 L 140 141 144   Potassium 3.7 3.7 4.0 4.2 4.3   BUN 15.9 20.5 H 19 10 13   Creatinine 0.88 0.99 0.9 0.8 0.7   eGFR  85 L 67 L  --   --   --    Calcium 10.4 H 10.2 H 10.2 10.2 9.5       CARDIAC BIOMARKERS:  Recent Labs   Lab 11/15/23  1657 12/29/23  1128 02/01/24  1244   CPK  --   --  47   Troponin I <0.006 <0.006  --        COAGS:  Recent Labs   Lab 02/01/24  1244   INR 1.0       LIPIDS/LFTS:  Recent Labs   Lab 11/15/23  1657 12/29/23  1128 02/01/24  1244   AST 13 17 21   ALT 13 16 19       No results found for: "HGBA1C"    TSH        The ASCVD Risk score (Camryn DK, et al., 2019) failed to calculate for the following reasons:    Cannot find a previous HDL lab    Cannot find a previous total cholesterol lab    The smoking status is invalid       BNP    Lab Results   Component Value Date/Time    BNP 19 12/29/2023 11:28 AM    BNP 19 11/15/2023 04:57 PM            ECHO    Results for orders placed during the hospital encounter of 07/19/20    Echo Color Flow " Doppler? Yes    Interpretation Summary  · Normal left ventricular systolic function. The estimated ejection fraction is 55%.  · Normal LV diastolic function.  · Normal right ventricular systolic function.  · No pulmonary hypertension present.      STRESS TEST    No results found for this or any previous visit.        CATH    No results found for this or any previous visit.              ASSESSMENT AND PLAN     Patient Active Problem List   Diagnosis    Nephrolithiasis    Acute post-traumatic headache, not intractable    Chronic pain disorder    Fibromyalgia    Bacteremia due to Escherichia coli    E. coli UTI    Possible COVID-19 Infection    Essential hypertension    Seizure disorder    Asthma    MDD (major depressive disorder)    Ureteral stone    Left ureteral calculus    Leg DVT (deep venous thromboembolism), acute, left    Anticoagulated         ALLERGIES AND MEDICATION:     Review of patient's allergies indicates:   Allergen Reactions    Penicillins Itching        Medication List            Accurate as of April 24, 2024  4:53 PM. If you have any questions, ask your nurse or doctor.                CONTINUE taking these medications      albuterol sulfate 2.5 mg/0.5 mL Nebu  Take 2.5 mg by nebulization every 4 (four) hours as needed (wheeze). Rescue     ELIQUIS 5 mg Tab  Generic drug: apixaban  Take 1 tablet (5 mg total) by mouth 2 (two) times daily.     ergocalciferol 50,000 unit Cap  Commonly known as: ERGOCALCIFEROL     gabapentin 300 MG capsule  Commonly known as: NEURONTIN     ondansetron 4 MG tablet  Commonly known as: ZOFRAN  Take 1 tablet (4 mg total) by mouth every 8 (eight) hours as needed.     * ondansetron 4 MG Tbdl  Commonly known as: ZOFRAN-ODT  Take 1 tablet (4 mg total) by mouth every 6 (six) hours as needed (Nausea).     * ondansetron 4 MG Tbdl  Commonly known as: ZOFRAN-ODT  Take 1 tablet (4 mg total) by mouth every 6 (six) hours as needed.     oxybutynin 5 MG Tab  Commonly known as:  DITROPAN  Take 1 tablet (5 mg total) by mouth 3 (three) times daily as needed (bladder spasms).     pantoprazole 20 MG tablet  Commonly known as: PROTONIX  Take 1 tablet (20 mg total) by mouth once daily.     triamterene-hydrochlorothiazide 37.5-25 mg 37.5-25 mg per capsule  Commonly known as: DYAZIDE  Take 1 capsule by mouth once daily.           * This list has 2 medication(s) that are the same as other medications prescribed for you. Read the directions carefully, and ask your doctor or other care provider to review them with you.                  No orders of the defined types were placed in this encounter.      Left lower extremity nonocclusive femoral vein DVT.  Her symptoms are bilateral.  Unlikely that they are coming from this DVT.  Reassurance given.  On Eliquis already.  Further management per Dr. Mcdaniels  No indication for thrombectomy    Peripheral ultrasound within normal limits    Follow-up after 6 m      Thank you very much for involving me in the care of your patient.  Please do not hesitate to contact me if there are any questions.      Candelario Chauhan MD, FAC, [facility]  Interventional Cardiologist, [facility].           This note was dictated with the help of speech recognition software.  There might be un-intended errors and/or substitutions.

## 2024-05-01 ENCOUNTER — LAB VISIT (OUTPATIENT)
Dept: LAB | Facility: HOSPITAL | Age: 59
End: 2024-05-01
Attending: STUDENT IN AN ORGANIZED HEALTH CARE EDUCATION/TRAINING PROGRAM
Payer: COMMERCIAL

## 2024-05-01 DIAGNOSIS — I82.402 LEG DVT (DEEP VENOUS THROMBOEMBOLISM), ACUTE, LEFT: ICD-10-CM

## 2024-05-01 DIAGNOSIS — Z79.01 ANTICOAGULATED: ICD-10-CM

## 2024-05-01 LAB
BASOPHILS # BLD AUTO: 0.06 K/UL (ref 0–0.2)
BASOPHILS NFR BLD: 0.8 % (ref 0–1.9)
DIFFERENTIAL METHOD BLD: ABNORMAL
EOSINOPHIL # BLD AUTO: 0.2 K/UL (ref 0–0.5)
EOSINOPHIL NFR BLD: 2 % (ref 0–8)
ERYTHROCYTE [DISTWIDTH] IN BLOOD BY AUTOMATED COUNT: 12.3 % (ref 11.5–14.5)
HCT VFR BLD AUTO: 44.1 % (ref 37–48.5)
HGB BLD-MCNC: 14.8 G/DL (ref 12–16)
IMM GRANULOCYTES # BLD AUTO: 0.04 K/UL (ref 0–0.04)
IMM GRANULOCYTES NFR BLD AUTO: 0.5 % (ref 0–0.5)
LYMPHOCYTES # BLD AUTO: 2.5 K/UL (ref 1–4.8)
LYMPHOCYTES NFR BLD: 31.1 % (ref 18–48)
MCH RBC QN AUTO: 28.7 PG (ref 27–31)
MCHC RBC AUTO-ENTMCNC: 33.6 G/DL (ref 32–36)
MCV RBC AUTO: 86 FL (ref 82–98)
MONOCYTES # BLD AUTO: 0.3 K/UL (ref 0.3–1)
MONOCYTES NFR BLD: 3.1 % (ref 4–15)
NEUTROPHILS # BLD AUTO: 5 K/UL (ref 1.8–7.7)
NEUTROPHILS NFR BLD: 62.5 % (ref 38–73)
NRBC BLD-RTO: 0 /100 WBC
PLATELET # BLD AUTO: 307 K/UL (ref 150–450)
PMV BLD AUTO: 10.5 FL (ref 9.2–12.9)
RBC # BLD AUTO: 5.15 M/UL (ref 4–5.4)
WBC # BLD AUTO: 7.97 K/UL (ref 3.9–12.7)

## 2024-05-01 PROCEDURE — 36415 COLL VENOUS BLD VENIPUNCTURE: CPT | Performed by: STUDENT IN AN ORGANIZED HEALTH CARE EDUCATION/TRAINING PROGRAM

## 2024-05-01 PROCEDURE — 85025 COMPLETE CBC W/AUTO DIFF WBC: CPT | Performed by: STUDENT IN AN ORGANIZED HEALTH CARE EDUCATION/TRAINING PROGRAM

## 2024-05-02 ENCOUNTER — OFFICE VISIT (OUTPATIENT)
Dept: HEMATOLOGY/ONCOLOGY | Facility: CLINIC | Age: 59
End: 2024-05-02
Payer: COMMERCIAL

## 2024-05-02 VITALS
OXYGEN SATURATION: 97 % | DIASTOLIC BLOOD PRESSURE: 68 MMHG | HEIGHT: 65 IN | SYSTOLIC BLOOD PRESSURE: 101 MMHG | BODY MASS INDEX: 27.99 KG/M2 | WEIGHT: 168 LBS | HEART RATE: 78 BPM

## 2024-05-02 DIAGNOSIS — I82.402 LEG DVT (DEEP VENOUS THROMBOEMBOLISM), ACUTE, LEFT: Primary | ICD-10-CM

## 2024-05-02 DIAGNOSIS — Z79.01 ANTICOAGULATED: ICD-10-CM

## 2024-05-02 DIAGNOSIS — G89.4 CHRONIC PAIN DISORDER: ICD-10-CM

## 2024-05-02 DIAGNOSIS — I10 ESSENTIAL HYPERTENSION: ICD-10-CM

## 2024-05-02 PROCEDURE — 4010F ACE/ARB THERAPY RXD/TAKEN: CPT | Mod: CPTII,S$GLB,, | Performed by: STUDENT IN AN ORGANIZED HEALTH CARE EDUCATION/TRAINING PROGRAM

## 2024-05-02 PROCEDURE — 3008F BODY MASS INDEX DOCD: CPT | Mod: CPTII,S$GLB,, | Performed by: STUDENT IN AN ORGANIZED HEALTH CARE EDUCATION/TRAINING PROGRAM

## 2024-05-02 PROCEDURE — 99214 OFFICE O/P EST MOD 30 MIN: CPT | Mod: S$GLB,,, | Performed by: STUDENT IN AN ORGANIZED HEALTH CARE EDUCATION/TRAINING PROGRAM

## 2024-05-02 PROCEDURE — 3078F DIAST BP <80 MM HG: CPT | Mod: CPTII,S$GLB,, | Performed by: STUDENT IN AN ORGANIZED HEALTH CARE EDUCATION/TRAINING PROGRAM

## 2024-05-02 PROCEDURE — 99999 PR PBB SHADOW E&M-EST. PATIENT-LVL III: CPT | Mod: PBBFAC,,, | Performed by: STUDENT IN AN ORGANIZED HEALTH CARE EDUCATION/TRAINING PROGRAM

## 2024-05-02 PROCEDURE — 3074F SYST BP LT 130 MM HG: CPT | Mod: CPTII,S$GLB,, | Performed by: STUDENT IN AN ORGANIZED HEALTH CARE EDUCATION/TRAINING PROGRAM

## 2024-05-02 RX ORDER — FLUTICASONE PROPIONATE AND SALMETEROL 250; 50 UG/1; UG/1
1 POWDER RESPIRATORY (INHALATION) 2 TIMES DAILY
COMMUNITY

## 2024-05-02 NOTE — PROGRESS NOTES
Hematology- Oncology Clinic Note :     5/2/2024    Chief Complaint   Patient presents with    Follow-up    Anticoagulation         HPI  Pt is a 59 y.o. female who  has a past medical history of Asthma, Brain mass (2007), Depression, Fibromyalgia, Hypertension, and Seizures (2007)..  Who presents for follow up of DVT.  Pt recently presented to  ED after complaining of bilateral leg swelling 2 weeks duration.  CTA negative for PE but LLE US positive for non occlusive DVT.  Patient also states she had a knee replacement surgery one year ago and a few months later she had a blood clot. Patient further states she had an epidural injection last Friday prior to ED presentation. Patient denied recent flights or was on blood thinners. Even though clots most likely provoked since she has a hx of multiple clots hypercoag workup ordered and will benefit from lifelong eliquis. Bleeding precautions given and pt agreeable to continue eliquis.      Interval hx:    On last visit pt went to ED and US demonstrated unchanged appearance of nonocclusive thrombus within the proximal to mid aspect of the left femoral vein. Still having bilateral leg pain but no new issues and cardiology eval also determine that pain is most likely not form blood clots.  Will continue eliquis and follow up in 1 year.        Active Problem List with Overview Notes    Diagnosis Date Noted    Leg DVT (deep venous thromboembolism), acute, left 03/06/2024    Anticoagulated 03/06/2024    Left ureteral calculus 07/31/2020    Bacteremia due to Escherichia coli 07/19/2020    E. coli UTI 07/19/2020    Possible COVID-19 Infection 07/19/2020    Essential hypertension 07/19/2020    Seizure disorder 07/19/2020    Asthma 07/19/2020    MDD (major depressive disorder) 07/19/2020    Ureteral stone 07/19/2020    Chronic pain disorder 08/06/2018    Fibromyalgia 08/06/2018    Acute post-traumatic headache, not intractable     Nephrolithiasis 04/07/2015       Patient Active  Problem List    Diagnosis Date Noted    Leg DVT (deep venous thromboembolism), acute, left 2024    Anticoagulated 2024    Left ureteral calculus 2020    Bacteremia due to Escherichia coli 2020    E. coli UTI 2020    Possible COVID-19 Infection 2020    Essential hypertension 2020    Seizure disorder 2020    Asthma 2020    MDD (major depressive disorder) 2020    Ureteral stone 2020    Chronic pain disorder 2018    Fibromyalgia 2018    Acute post-traumatic headache, not intractable     Nephrolithiasis 2015     Past Medical History:   Diagnosis Date    Asthma     Brain mass     Depression     Fibromyalgia     Hypertension     Seizures     fell and hit head at grocery         Past Surgical History:   Procedure Laterality Date    CARPAL TUNNEL RELEASE      right     SECTION      x 3    cysto/ureteroscopy stent placement      CYSTOSCOPY W/ URETERAL STENT PLACEMENT Left 2020    Procedure: CYSTOSCOPY, WITH URETERAL STENT INSERTION;  Surgeon: Yessi Granados MD;  Location: [facility] OR;  Service: Urology;  Laterality: Left;    HYSTERECTOMY      RETROGRADE PYELOGRAPHY N/A 2020    Procedure: PYELOGRAM, RETROGRADE;  Surgeon: Yessi Granados MD;  Location: [facility] OR;  Service: Urology;  Laterality: N/A;    TONSILLECTOMY      URETEROSCOPIC REMOVAL OF URETERIC CALCULUS Left 2020    Procedure: Cystoscopy, possible retrograde pyelogram, ureteroscopy with laser lithotripsy, placement of ureteral stent;  Surgeon: Yessi Granados MD;  Location: [facility] OR;  Service: Urology;  Laterality: Left;  RN PREOP 2020---COVID NEGATIVE ON       (Not in a hospital admission)    Review of patient's allergies indicates:   Allergen Reactions    Penicillins Itching      Social History     Tobacco Use    Smoking status: Never    Smokeless tobacco: Never   Substance Use Topics    Alcohol use: Yes     Comment: occasionally       Family History   Problem Relation Name Age of Onset    Cancer Mother          Breast cancer    Heart disease Mother      Diabetes Father      Cancer Maternal Grandmother          Lung Cancer    Cancer Maternal Grandfather          Lung Cancer    Cancer Paternal Grandmother      Cancer Paternal Grandfather          Review of Systems :  Review of Systems   Constitutional:  Negative for chills, fever, malaise/fatigue and weight loss.   HENT:  Negative for hearing loss.    Eyes:  Negative for blurred vision and pain.   Respiratory:  Negative for cough, sputum production and shortness of breath.    Cardiovascular:  Positive for leg swelling.   Gastrointestinal:  Positive for nausea and vomiting. Negative for constipation, diarrhea and heartburn.   Genitourinary:  Negative for dysuria and hematuria.   Musculoskeletal:  Positive for joint pain and myalgias.   Skin:  Negative for itching and rash.   Neurological:  Negative for dizziness and sensory change.   Endo/Heme/Allergies:  Does not bruise/bleed easily.   Psychiatric/Behavioral:  Negative for depression. The patient is not nervous/anxious.        Physical Exam :  Wt Readings from Last 3 Encounters:   05/02/24 76.2 kg (167 lb 15.9 oz)   04/24/24 76.1 kg (167 lb 12.3 oz)   03/06/24 80 kg (176 lb 5.9 oz)     Temp Readings from Last 3 Encounters:   02/01/24 98.4 °F (36.9 °C) (Oral)   12/29/23 98.1 °F (36.7 °C) (Oral)   11/15/23 98 °F (36.7 °C) (Oral)     BP Readings from Last 3 Encounters:   05/02/24 101/68   04/24/24 126/74   03/06/24 126/62     Pulse Readings from Last 3 Encounters:   05/02/24 78   04/24/24 110   03/06/24 105     Body mass index is 27.96 kg/m².    Physical Exam  Vitals reviewed.   HENT:      Head: Normocephalic and atraumatic.      Nose: Nose normal.      Mouth/Throat:      Mouth: Mucous membranes are moist.   Eyes:      Pupils: Pupils are equal, round, and reactive to light.   Cardiovascular:      Rate and Rhythm: Normal rate and regular rhythm.    Pulmonary:      Effort: Pulmonary effort is normal.      Breath sounds: Normal breath sounds.   Abdominal:      General: Abdomen is flat.   Musculoskeletal:         General: Normal range of motion.      Cervical back: Normal range of motion and neck supple.   Skin:     General: Skin is warm and dry.   Neurological:      Mental Status: She is alert. Mental status is at baseline.   Psychiatric:         Mood and Affect: Mood normal.         Behavior: Behavior normal.         Pertinent Diagnostic studies:    No results found for this or any previous visit (from the past 24 hour(s)).    Assessment/Plan :       LLE DVT/Hx of clots  -Past DVT most likely provoked by surgery  -Seen on recent ED visit after epidural, inactivity can contribute  -Nonocclusive deep venous thrombosis within the left femoral vein on US  -Has cardiology follow up  -hypercoag workup  unremarkable  -Would recommend lifelong eliquis, refills sent to pharmacy  -RTC in 1 year for MD visit and CBC      Intractable N/V worsening leg pain  -Chronic  -Most likely referred knee pain  -Clot stable on imaging       Lung nodule  -Seen on recent CTA  -Stable 1.2 cm right lower lobe pulmonary nodule, requiring no follow-up as appears stable compared to remote CT from 2015 and likely reflecting benign etiology per radiology   -Pt following with pulmonologist       Time spent on case: 30 minutes    Summary of orders placed this encounter:  Orders Placed This Encounter   Procedures    CBC W/ AUTO DIFFERENTIAL       No future appointments.              Kelin Mcdaniels MD   Hematology/oncology, [facility] [place]

## 2024-05-14 ENCOUNTER — TELEPHONE (OUTPATIENT)
Dept: HEMATOLOGY/ONCOLOGY | Facility: CLINIC | Age: 59
End: 2024-05-14
Payer: COMMERCIAL

## 2024-05-14 NOTE — TELEPHONE ENCOUNTER
"----- Message from Kelin Mcdaniels MD sent at 5/13/2024  6:00 PM CDT -----  Fillings are considered safe on eliquis  ----- Message -----  From: Mae Garcia MA  Sent: 5/13/2024   3:57 PM CDT  To: Kelin Mcdaniels MD    It may just be a filling. Pt was advised to watch bleeding with dental work. And cardiologist stated no shots and dental procedures, but she is wanting to make it its okay with you. Is not schedule yet, but will let us know.  ----- Message -----  From: Kelin Mcdaniels MD  Sent: 5/13/2024   3:49 PM CDT  To: Mae Garcia MA    Teeth cleaning yes but if tooth extraction may need to hold anticoagulation.  If needing dental clearance ask dentist for clearance form to bring in for us to fill out.  ----- Message -----  From: Mae Garcia MA  Sent: 5/13/2024   3:38 PM CDT  To: Kelin Mcdaniels MD      ----- Message -----  From: Namita Mayes  Sent: 5/13/2024   3:37 PM CDT  To: Kelin Mcdaniels Staff    Consult/Advisory:      Name Of Caller: Self    Contact Preference:   [phone]     What is the nature of the call?:  Pt stated that she would like know , if she can go ahead with an  apt to get dental work done. Please  advised       Additional Notes:  "Thank you for all that you do for our patients"  "

## 2024-05-28 ENCOUNTER — TELEPHONE (OUTPATIENT)
Dept: HEMATOLOGY/ONCOLOGY | Facility: CLINIC | Age: 59
End: 2024-05-28
Payer: COMMERCIAL

## 2024-06-07 NOTE — ASSESSMENT & PLAN NOTE
- May be  source 2/2 ureteral stone. Without typical renal colic symptoms.   Follow cultures, treat x 2 weeks    PEA cardiac arrest in the operating room, status post induction prior to the surgery even being started.  No evidence of tension pneumothorax.  Now moving all 4 extremities but no meaningful commands, profoundly hypotensive at this time.  Did not initiate targeted temperature management given patient was moving all 4 extremities.  We will continue to assess neurological examination.  We will move propofol over to Precedex for sedation as this will help with his bradycardia as well as hemodynamic status in the setting of newly depressed EF.

## 2024-06-10 ENCOUNTER — TELEPHONE (OUTPATIENT)
Dept: HEMATOLOGY/ONCOLOGY | Facility: CLINIC | Age: 59
End: 2024-06-10
Payer: COMMERCIAL

## 2024-06-10 NOTE — TELEPHONE ENCOUNTER
----- Message from Kelin Mcdaniels MD sent at 6/10/2024 12:14 PM CDT -----  Regarding: RE: Patient advice  Contact: Pt  [phone]  Would go to ED  ----- Message -----  From: Mae Garcia MA  Sent: 6/10/2024  11:57 AM CDT  To: Kelin Mcdaniels MD  Subject: FW: Patient advice                               How should I advise patient?  ----- Message -----  From: Jil Luevano  Sent: 6/10/2024  10:46 AM CDT  To: Kelin Mcdaniels Staff  Subject: Patient advice                                             Name of Caller: Myra     Contact Preference:   [phone]    Nature of Call: Requesting a call back states she has been vomiting for over a month is concerned  states she feels light head slightly dizzy but nausea all the time

## 2024-06-19 ENCOUNTER — PATIENT MESSAGE (OUTPATIENT)
Dept: HEMATOLOGY/ONCOLOGY | Facility: CLINIC | Age: 59
End: 2024-06-19
Payer: COMMERCIAL

## 2024-10-04 ENCOUNTER — TELEPHONE (OUTPATIENT)
Dept: CARDIOLOGY | Facility: CLINIC | Age: 59
End: 2024-10-04
Payer: COMMERCIAL

## 2024-10-04 NOTE — TELEPHONE ENCOUNTER
----- Message from Bacilio sent at 10/4/2024  2:47 PM CDT -----  Regarding: self  Type: Patient Call Back    Who called:self    What is the request in detail:calling to speak to this office about getting seen sooner due to needing a clearance, she missed appt yesterday due not knowing of it.    Can the clinic reply by [facility]?no    Would the patient rather a call back or a response via [facility]?     Best call back number:[phone]    Additional Information:

## 2024-10-30 ENCOUNTER — OFFICE VISIT (OUTPATIENT)
Dept: CARDIOLOGY | Facility: CLINIC | Age: 59
End: 2024-10-30
Payer: COMMERCIAL

## 2024-10-30 VITALS
HEIGHT: 65 IN | RESPIRATION RATE: 15 BRPM | WEIGHT: 165.38 LBS | BODY MASS INDEX: 27.56 KG/M2 | SYSTOLIC BLOOD PRESSURE: 106 MMHG | HEART RATE: 78 BPM | OXYGEN SATURATION: 98 % | DIASTOLIC BLOOD PRESSURE: 68 MMHG

## 2024-10-30 DIAGNOSIS — I82.4Y9 DEEP VEIN THROMBOSIS (DVT) OF PROXIMAL LOWER EXTREMITY, UNSPECIFIED CHRONICITY, UNSPECIFIED LATERALITY: Primary | ICD-10-CM

## 2024-10-30 DIAGNOSIS — Z79.01 ANTICOAGULATED: ICD-10-CM

## 2024-10-30 DIAGNOSIS — I82.402 LEG DVT (DEEP VENOUS THROMBOEMBOLISM), ACUTE, LEFT: ICD-10-CM

## 2024-10-30 PROCEDURE — 99999 PR PBB SHADOW E&M-EST. PATIENT-LVL IV: CPT | Mod: PBBFAC,,, | Performed by: INTERNAL MEDICINE

## 2024-11-11 ENCOUNTER — HOSPITAL ENCOUNTER (OUTPATIENT)
Dept: CARDIOLOGY | Facility: HOSPITAL | Age: 59
Discharge: HOME | End: 2024-11-11
Attending: INTERNAL MEDICINE
Payer: COMMERCIAL

## 2024-11-11 DIAGNOSIS — I82.4Y9 DEEP VEIN THROMBOSIS (DVT) OF PROXIMAL LOWER EXTREMITY, UNSPECIFIED CHRONICITY, UNSPECIFIED LATERALITY: ICD-10-CM

## 2024-11-11 PROCEDURE — 93970 EXTREMITY STUDY: CPT | Mod: 26,,, | Performed by: INTERNAL MEDICINE

## 2024-11-11 PROCEDURE — 93970 EXTREMITY STUDY: CPT

## 2024-11-21 ENCOUNTER — OFFICE VISIT (OUTPATIENT)
Dept: CARDIOLOGY | Facility: CLINIC | Age: 59
End: 2024-11-21
Payer: COMMERCIAL

## 2024-11-21 VITALS
WEIGHT: 167.69 LBS | SYSTOLIC BLOOD PRESSURE: 112 MMHG | OXYGEN SATURATION: 95 % | BODY MASS INDEX: 31.66 KG/M2 | DIASTOLIC BLOOD PRESSURE: 78 MMHG | RESPIRATION RATE: 15 BRPM | HEART RATE: 85 BPM | HEIGHT: 61 IN

## 2024-11-21 DIAGNOSIS — I82.4Y9 DEEP VEIN THROMBOSIS (DVT) OF PROXIMAL LOWER EXTREMITY, UNSPECIFIED CHRONICITY, UNSPECIFIED LATERALITY: Primary | ICD-10-CM

## 2024-11-21 DIAGNOSIS — Z79.01 ANTICOAGULATED: ICD-10-CM

## 2024-11-21 PROCEDURE — 3008F BODY MASS INDEX DOCD: CPT | Mod: CPTII,S$GLB,, | Performed by: INTERNAL MEDICINE

## 2024-11-21 PROCEDURE — 99999 PR PBB SHADOW E&M-EST. PATIENT-LVL IV: CPT | Mod: PBBFAC,,, | Performed by: INTERNAL MEDICINE

## 2024-11-21 PROCEDURE — 93000 ELECTROCARDIOGRAM COMPLETE: CPT | Mod: S$GLB,,, | Performed by: INTERNAL MEDICINE

## 2024-11-21 PROCEDURE — 99214 OFFICE O/P EST MOD 30 MIN: CPT | Mod: S$GLB,,, | Performed by: INTERNAL MEDICINE

## 2024-11-21 PROCEDURE — 4010F ACE/ARB THERAPY RXD/TAKEN: CPT | Mod: CPTII,S$GLB,, | Performed by: INTERNAL MEDICINE

## 2024-11-21 PROCEDURE — 3078F DIAST BP <80 MM HG: CPT | Mod: CPTII,S$GLB,, | Performed by: INTERNAL MEDICINE

## 2024-11-21 PROCEDURE — 3074F SYST BP LT 130 MM HG: CPT | Mod: CPTII,S$GLB,, | Performed by: INTERNAL MEDICINE

## 2024-11-21 PROCEDURE — G2211 COMPLEX E/M VISIT ADD ON: HCPCS | Mod: S$GLB,,, | Performed by: INTERNAL MEDICINE

## 2024-11-21 PROCEDURE — 1159F MED LIST DOCD IN RCRD: CPT | Mod: CPTII,S$GLB,, | Performed by: INTERNAL MEDICINE

## 2024-11-21 NOTE — PROGRESS NOTES
CARDIOVASCULAR CONSULTATION    REASON FOR CONSULT:   Myra Harrison is a 59 y.o. female who presents for   Chief Complaint   Patient presents with    Results     HISTORY OF PRESENT ILLNESS:     Patient is a pleasant 58-year-old lady.  Had knee surgery done last year.  After that DVT of left femoral vein.  Has had multiple ultrasounds done since then which have revealed partially occlusive DVT of left femoral vein, including 1 done recently at [facility].  She has musculoskeletal myalgias and bilateral leg pains which occur from the thighs all the way down to the feet.  No particular aggravating or relieving factors.  She has brought a she would from her primary care physician requesting a CTA angiogram of the legs to check for circulation.  She did have CT angiogram of her lungs performed in November which did not reveal any pulmonary embolism.  Denies orthopnea PND.  Does have chronic cough.      Impression:     1. No evidence of PE.  2. No acute intrathoracic abnormalities identified.  3. Stable 1.2 cm right lower lobe pulmonary nodule, requiring no follow-up as appears stable compared to remote CT from 2015 and likely reflecting benign etiology.    Impression:     Suspected ongoing nonocclusive thrombus in the left femoral vein.  No DVT in the right lower extremity.       Impression:     Unchanged appearance of nonocclusive thrombus within the proximal to mid aspect of the left femoral vein.        CONCLUSIONS     Normal left ventricular systolic function.     Calculated left ventricular ejection fraction by 2D tracking is 60 %.     Normal diastolic function.     There were no regional wall motion abnormalities.     Indeterminate right ventricular systolic function.       - TAPSE 12.4mm, S' 10.3cm/s.       - Normal Right ventricular systolic pressure 26.2 mmHg.     Normal atrial dimensions.     Normal cardiac valve structures.     Normal doppler study - trace MR, TR, VT only.    Findings:     Right  CFV: Patent   Right GSV: Patent   Right PFV: Patent   Right SFV Prox: Patent   Right SFV Mid: Patent   Right SFV Distal: Patent   Right Popliteal: Patent   Right Peroneal: Patent   Right PTV: Patent     Left CFV: Patent   Left GSV: Patent   Left PFV: Patent   Left SFV Prox: Partially Occluded   Left SFV Mid: Patent   Left SFV Distal: Patent   Left Popliteal: Patent   Left Peroneal: Patent   Left PTV: Patent      Notes from April 2024: Patient here for follow-up.  Denies any chest pains at rest on exertion, orthopnea, PND.  Has been doing fine.  Main complaint is chronic dyspnea on exertion which has been going on for many years      02/25/2021 Dobutamine Stress Test  Findings:   The left ventricle is normal in size.   Post stress SPECT images demonstrate normal perfusion in all regions. The rest images reveal no reversibility. Gated SPECT imaging demonstrates normal wall motion in all regions with a left ventricular ejection fraction estimated to be 68.00%.      Impression:  Stress ECG portion was negative for ischemia.   The overall study quality is good.   Normal perfusion scan with no evidence of inducible ischemia or areas of infarct      There is no prior study for comparison    November 21st 2024    History of Present Illness    CHIEF COMPLAINT:  Myra presents today for leg pain.    LEG PAIN:  She reports constant pain in both legs. The exact location within the legs and specific cause are not identified.    CHEST DISCOMFORT:  She experiences occasional chest pain, including a cramp during the EKG procedure. She denies constant chest pressure or pain related to specific activities such as walking or lying down.    CIRCULATION:  Recent venous test revealed a chronic clot in the superficial veins, which may be causing inflammation and pain. No other circulatory concerns were noted.    MEDICATIONS:  She continues adherence to prescribed blood thinners.    WEIGHT:  She reports being overweight.         Recent  ultrasound done in 2024 demonstrated no evidence of right lower extremity DVT, left common femoral vein was patent and left superficial femoral proximal vein at partial compressibility with chronic thrombus as well as left superficial femoral middle vein had partial compressible lip 80 with chronic thrombus    Arterial ultrasound showed no significant flow-limiting stenosis        PAST MEDICAL HISTORY:     Past Medical History:   Diagnosis Date    Asthma     Brain mass     Depression     Fibromyalgia     Hypertension     Seizures 2007    fell and hit head at grocery          PAST SURGICAL HISTORY:     Past Surgical History:   Procedure Laterality Date    CARPAL TUNNEL RELEASE      right     SECTION      x 3    cysto/ureteroscopy stent placement      CYSTOSCOPY W/ URETERAL STENT PLACEMENT Left 2020    Procedure: CYSTOSCOPY, WITH URETERAL STENT INSERTION;  Surgeon: Yessi Granados MD;  Location: [facility] OR;  Service: Urology;  Laterality: Left;    HYSTERECTOMY      RETROGRADE PYELOGRAPHY N/A 2020    Procedure: PYELOGRAM, RETROGRADE;  Surgeon: Yessi Granados MD;  Location: [facility] OR;  Service: Urology;  Laterality: N/A;    TONSILLECTOMY      URETEROSCOPIC REMOVAL OF URETERIC CALCULUS Left 2020    Procedure: Cystoscopy, possible retrograde pyelogram, ureteroscopy with laser lithotripsy, placement of ureteral stent;  Surgeon: Yessi Granados MD;  Location: [facility] OR;  Service: Urology;  Laterality: Left;  RN PREOP 2020---COVID NEGATIVE ON            SOCIAL HISTORY:     Social History     Socioeconomic History    Marital status:    Tobacco Use    Smoking status: Never    Smokeless tobacco: Never   Substance and Sexual Activity    Alcohol use: Yes     Comment: occasionally    Drug use: No    Sexual activity: Yes       FAMILY HISTORY:     Family History   Problem Relation Name Age of Onset    Cancer Mother          Breast cancer    Heart disease Mother       "Diabetes Father      Cancer Maternal Grandmother          Lung Cancer    Cancer Maternal Grandfather          Lung Cancer    Cancer Paternal Grandmother      Cancer Paternal Grandfather         REVIEW OF SYSTEMS:   Review of Systems   Constitutional: Negative.   HENT: Negative.     Eyes: Negative.    Cardiovascular: Negative.    Respiratory: Negative.     Endocrine: Negative.    Hematologic/Lymphatic: Negative.    Skin: Negative.    Musculoskeletal:  Positive for joint pain and myalgias.   Gastrointestinal: Negative.    Genitourinary: Negative.    Neurological: Negative.    Psychiatric/Behavioral: Negative.     Allergic/Immunologic: Negative.        A 10 point review of systems was performed and all the pertinent positives have been mentioned. Rest of review of systems was negative.        PHYSICAL EXAM:     Vitals:    11/21/24 1119   BP: 112/78   Pulse: 85   Resp: 15    Body mass index is 31.68 kg/m².  Weight: 76.1 kg (167 lb 10.6 oz)   Height: 5' 1" (154.9 cm)     Physical Exam  Constitutional:       Appearance: Normal appearance. She is well-developed.   HENT:      Head: Normocephalic.   Eyes:      Pupils: Pupils are equal, round, and reactive to light.   Cardiovascular:      Rate and Rhythm: Normal rate and regular rhythm.   Pulmonary:      Effort: Pulmonary effort is normal.      Breath sounds: Normal breath sounds.   Abdominal:      General: Bowel sounds are normal.      Palpations: Abdomen is soft.      Tenderness: There is no abdominal tenderness.   Musculoskeletal:         General: Normal range of motion.      Cervical back: Normal range of motion and neck supple.   Skin:     General: Skin is warm.   Neurological:      Mental Status: She is alert and oriented to person, place, and time.           DATA:     Laboratory:  CBC:  Recent Labs   Lab 12/29/23  1128 02/01/24  1244 05/01/24  1158   WBC 6.33 5.90 7.97   Hemoglobin 13.8 13.7 14.8   Hematocrit 43.0 42.7 44.1   Platelets 245 263 307 " "      CHEMISTRIES:  Recent Labs   Lab 08/31/22  1155 06/13/23  1143 08/28/23  1100 11/15/23  1657 12/29/23  1128 02/01/24  1244   Glucose  --   --    < > 110 93 98   Sodium 140 134 L   < > 140 141 144   Potassium 3.7 3.7   < > 4.0 4.2 4.3   BUN 15.9 20.5 H  --  19 10 13   Blood Urea Nitrogen  --   --    < >  --   --   --    Creatinine 0.88 0.99   < > 0.9 0.8 0.7   eGFR  85 L 67 L  --   --   --   --    Calcium 10.4 H 10.2 H   < > 10.2 10.2 9.5    < > = values in this interval not displayed.       CARDIAC BIOMARKERS:  Recent Labs   Lab 11/15/23  1657 12/29/23  1128 02/01/24  1244   CPK  --   --  47   Troponin I <0.006 <0.006  --        COAGS:  Recent Labs   Lab 02/01/24  1244   INR 1.0       LIPIDS/LFTS:  Recent Labs   Lab 11/15/23  1657 12/29/23  1128 02/01/24  1244   AST 13 17 21   ALT 13 16 19       No results found for: "HGBA1C"    TSH        The ASCVD Risk score (Camryn DK, et al., 2019) failed to calculate for the following reasons:    Cannot find a previous HDL lab    Cannot find a previous total cholesterol lab       BNP    Lab Results   Component Value Date/Time    BNP 19 12/29/2023 11:28 AM    BNP 19 11/15/2023 04:57 PM            ECHO    Results for orders placed during the hospital encounter of 07/19/20    Echo Color Flow Doppler? Yes    Interpretation Summary  · Normal left ventricular systolic function. The estimated ejection fraction is 55%.  · Normal LV diastolic function.  · Normal right ventricular systolic function.  · No pulmonary hypertension present.      STRESS TEST    No results found for this or any previous visit.        CATH    No results found for this or any previous visit.              ASSESSMENT AND PLAN     Patient Active Problem List   Diagnosis    Nephrolithiasis    Acute post-traumatic headache, not intractable    Chronic pain disorder    Fibromyalgia    Bacteremia due to Escherichia coli    E. coli UTI    Possible COVID-19 Infection    Essential hypertension    " Seizure disorder    Asthma    MDD (major depressive disorder)    Ureteral stone    Left ureteral calculus    Leg DVT (deep venous thromboembolism), acute, left    Anticoagulated         ALLERGIES AND MEDICATION:     Review of patient's allergies indicates:   Allergen Reactions    Penicillins Itching        Medication List            Accurate as of November 21, 2024  2:21 PM. If you have any questions, ask your nurse or doctor.                CONTINUE taking these medications      albuterol sulfate 2.5 mg/0.5 mL Nebu  Take 2.5 mg by nebulization every 4 (four) hours as needed (wheeze). Rescue     ELIQUIS 5 mg Tab  Generic drug: apixaban  Take 1 tablet (5 mg total) by mouth 2 (two) times daily.     ergocalciferol 50,000 unit Cap  Commonly known as: ERGOCALCIFEROL     fluticasone-salmeterol 250-50 mcg/dose 250-50 mcg/dose diskus inhaler  Commonly known as: ADVAIR     gabapentin 300 MG capsule  Commonly known as: NEURONTIN     ondansetron 4 MG tablet  Commonly known as: ZOFRAN  Take 1 tablet (4 mg total) by mouth every 8 (eight) hours as needed.     * ondansetron 4 MG Tbdl  Commonly known as: ZOFRAN-ODT  Take 1 tablet (4 mg total) by mouth every 6 (six) hours as needed (Nausea).     * ondansetron 4 MG Tbdl  Commonly known as: ZOFRAN-ODT  Take 1 tablet (4 mg total) by mouth every 6 (six) hours as needed.     oxybutynin 5 MG Tab  Commonly known as: DITROPAN  Take 1 tablet (5 mg total) by mouth 3 (three) times daily as needed (bladder spasms).     pantoprazole 20 MG tablet  Commonly known as: PROTONIX  Take 1 tablet (20 mg total) by mouth once daily.     triamterene-hydrochlorothiazide 37.5-25 mg 37.5-25 mg per capsule  Commonly known as: DYAZIDE  Take 1 capsule by mouth once daily.     UNABLE TO FIND           * This list has 2 medication(s) that are the same as other medications prescribed for you. Read the directions carefully, and ask your doctor or other care provider to review them with you.                  No  orders of the defined types were placed in this encounter.      Left lower extremity nonocclusive femoral vein DVT.  Her symptoms are bilateral.  Unlikely that they are coming from this DVT.  Reassurance given.  On Eliquis already.  Continue Eliquis.  Final duration of therapy to be determined by Heme-Onc and Heme-Onc determined lifelong anticoagulation      Follow-up after 6-12 m      Thank you very much for involving me in the care of your patient.  Please do not hesitate to contact me if there are any questions.      Candelario Chauhan MD, FACC, [facility]  Interventional Cardiologist, [facility].       Visit today included increased complexity associated with the care of the episodic problem dvt, chronic anticoagulation addressed and managing the longitudinal care of the patient due to the serious and/or complex managed problem(s)   Patient Active Problem List   Diagnosis    Nephrolithiasis    Acute post-traumatic headache, not intractable    Chronic pain disorder    Fibromyalgia    Bacteremia due to Escherichia coli    E. coli UTI    Possible COVID-19 Infection    Essential hypertension    Seizure disorder    Asthma    MDD (major depressive disorder)    Ureteral stone    Left ureteral calculus    Leg DVT (deep venous thromboembolism), acute, left    Anticoagulated     .      This note was dictated with the help of speech recognition software.  There might be un-intended errors and/or substitutions.

## 2024-11-23 LAB
OHS QRS DURATION: 90 MS
OHS QTC CALCULATION: 460 MS

## 2025-01-30 ENCOUNTER — TELEPHONE (OUTPATIENT)
Dept: HEMATOLOGY/ONCOLOGY | Facility: CLINIC | Age: 60
End: 2025-01-30
Payer: COMMERCIAL

## 2025-01-30 NOTE — TELEPHONE ENCOUNTER
Patient contacted office c/o pain in both legs  No response  Message left on VM advising her that due to having a history of DVT if she is having pain in both legs she needs to present to ER today  She may contact office with any further questions     Pt returned call and stated she was out of town but her legs have been swollen and painful X 3 days  Advised her to present to nearest ER at this time  She acknowledged understanding

## 2025-02-03 ENCOUNTER — HOSPITAL ENCOUNTER (EMERGENCY)
Facility: HOSPITAL | Age: 60
Discharge: HOME | End: 2025-02-03
Attending: EMERGENCY MEDICINE
Payer: COMMERCIAL

## 2025-02-03 VITALS
BODY MASS INDEX: 31.55 KG/M2 | RESPIRATION RATE: 18 BRPM | HEART RATE: 76 BPM | DIASTOLIC BLOOD PRESSURE: 61 MMHG | WEIGHT: 167 LBS | SYSTOLIC BLOOD PRESSURE: 119 MMHG | TEMPERATURE: 98 F | OXYGEN SATURATION: 98 %

## 2025-02-03 DIAGNOSIS — M79.7 FIBROMYALGIA MUSCLE PAIN: ICD-10-CM

## 2025-02-03 DIAGNOSIS — M79.605 LEG PAIN, BILATERAL: ICD-10-CM

## 2025-02-03 DIAGNOSIS — M79.604 LEG PAIN, BILATERAL: ICD-10-CM

## 2025-02-03 DIAGNOSIS — R79.89 ELEVATED BRAIN NATRIURETIC PEPTIDE (BNP) LEVEL: ICD-10-CM

## 2025-02-03 DIAGNOSIS — M54.50 ACUTE BILATERAL LOW BACK PAIN, UNSPECIFIED WHETHER SCIATICA PRESENT: Primary | ICD-10-CM

## 2025-02-03 LAB
ALBUMIN SERPL BCP-MCNC: 3.9 G/DL (ref 3.5–5.2)
ALP SERPL-CCNC: 59 U/L (ref 40–150)
ALT SERPL W/O P-5'-P-CCNC: 11 U/L (ref 10–44)
ANION GAP SERPL CALC-SCNC: 8 MMOL/L (ref 8–16)
APAP SERPL-MCNC: <3 UG/ML (ref 10–20)
APTT PPP: 26.1 SEC (ref 21–32)
AST SERPL-CCNC: 13 U/L (ref 10–40)
BASOPHILS # BLD AUTO: 0.05 K/UL (ref 0–0.2)
BASOPHILS NFR BLD: 0.6 % (ref 0–1.9)
BILIRUB SERPL-MCNC: 0.4 MG/DL (ref 0.1–1)
BNP SERPL-MCNC: 179 PG/ML (ref 0–99)
BUN SERPL-MCNC: 15 MG/DL (ref 6–20)
CALCIUM SERPL-MCNC: 9.5 MG/DL (ref 8.7–10.5)
CHLORIDE SERPL-SCNC: 107 MMOL/L (ref 95–110)
CK SERPL-CCNC: 43 U/L (ref 20–180)
CO2 SERPL-SCNC: 24 MMOL/L (ref 23–29)
CREAT SERPL-MCNC: 0.8 MG/DL (ref 0.5–1.4)
DIFFERENTIAL METHOD BLD: ABNORMAL
EOSINOPHIL # BLD AUTO: 0.2 K/UL (ref 0–0.5)
EOSINOPHIL NFR BLD: 2.3 % (ref 0–8)
ERYTHROCYTE [DISTWIDTH] IN BLOOD BY AUTOMATED COUNT: 12.9 % (ref 11.5–14.5)
EST. GFR  (NO RACE VARIABLE): >60 ML/MIN/1.73 M^2
GLUCOSE SERPL-MCNC: 88 MG/DL (ref 70–110)
HCT VFR BLD AUTO: 44 % (ref 37–48.5)
HGB BLD-MCNC: 14.2 G/DL (ref 12–16)
IMM GRANULOCYTES # BLD AUTO: 0.05 K/UL (ref 0–0.04)
IMM GRANULOCYTES NFR BLD AUTO: 0.6 % (ref 0–0.5)
INR PPP: 1 (ref 0.8–1.2)
LYMPHOCYTES # BLD AUTO: 3 K/UL (ref 1–4.8)
LYMPHOCYTES NFR BLD: 36.5 % (ref 18–48)
MCH RBC QN AUTO: 29.6 PG (ref 27–31)
MCHC RBC AUTO-ENTMCNC: 32.3 G/DL (ref 32–36)
MCV RBC AUTO: 92 FL (ref 82–98)
MONOCYTES # BLD AUTO: 0.4 K/UL (ref 0.3–1)
MONOCYTES NFR BLD: 5.2 % (ref 4–15)
NEUTROPHILS # BLD AUTO: 4.5 K/UL (ref 1.8–7.7)
NEUTROPHILS NFR BLD: 54.8 % (ref 38–73)
NRBC BLD-RTO: 0 /100 WBC
PLATELET # BLD AUTO: 252 K/UL (ref 150–450)
PMV BLD AUTO: 10.7 FL (ref 9.2–12.9)
POTASSIUM SERPL-SCNC: 4 MMOL/L (ref 3.5–5.1)
PROT SERPL-MCNC: 7 G/DL (ref 6–8.4)
PROTHROMBIN TIME: 11.2 SEC (ref 9–12.5)
RBC # BLD AUTO: 4.79 M/UL (ref 4–5.4)
SALICYLATES SERPL-MCNC: <5 MG/DL (ref 15–30)
SODIUM SERPL-SCNC: 139 MMOL/L (ref 136–145)
WBC # BLD AUTO: 8.27 K/UL (ref 3.9–12.7)

## 2025-02-03 PROCEDURE — 82550 ASSAY OF CK (CPK): CPT

## 2025-02-03 PROCEDURE — 85610 PROTHROMBIN TIME: CPT | Performed by: EMERGENCY MEDICINE

## 2025-02-03 PROCEDURE — 99285 EMERGENCY DEPT VISIT HI MDM: CPT | Mod: 25

## 2025-02-03 PROCEDURE — 83880 ASSAY OF NATRIURETIC PEPTIDE: CPT | Performed by: EMERGENCY MEDICINE

## 2025-02-03 PROCEDURE — 85730 THROMBOPLASTIN TIME PARTIAL: CPT | Performed by: EMERGENCY MEDICINE

## 2025-02-03 PROCEDURE — 25000003 PHARM REV CODE 250

## 2025-02-03 PROCEDURE — 80179 DRUG ASSAY SALICYLATE: CPT | Performed by: EMERGENCY MEDICINE

## 2025-02-03 PROCEDURE — 85025 COMPLETE CBC W/AUTO DIFF WBC: CPT | Performed by: EMERGENCY MEDICINE

## 2025-02-03 PROCEDURE — 80143 DRUG ASSAY ACETAMINOPHEN: CPT

## 2025-02-03 PROCEDURE — 80053 COMPREHEN METABOLIC PANEL: CPT | Performed by: EMERGENCY MEDICINE

## 2025-02-03 RX ORDER — OXYCODONE HYDROCHLORIDE 5 MG/1
5 TABLET ORAL
Status: COMPLETED | OUTPATIENT
Start: 2025-02-03 | End: 2025-02-03

## 2025-02-03 RX ORDER — METHOCARBAMOL 500 MG/1
1000 TABLET, FILM COATED ORAL 3 TIMES DAILY
Qty: 30 TABLET | Refills: 0 | Status: SHIPPED | OUTPATIENT
Start: 2025-02-03 | End: 2025-02-08

## 2025-02-03 RX ORDER — METHOCARBAMOL 500 MG/1
1000 TABLET, FILM COATED ORAL
Status: COMPLETED | OUTPATIENT
Start: 2025-02-03 | End: 2025-02-03

## 2025-02-03 RX ORDER — LIDOCAINE 50 MG/G
1 PATCH TOPICAL DAILY
Qty: 15 PATCH | Refills: 0 | Status: SHIPPED | OUTPATIENT
Start: 2025-02-03 | End: 2025-02-25

## 2025-02-03 RX ADMIN — OXYCODONE HYDROCHLORIDE 5 MG: 5 TABLET ORAL at 05:02

## 2025-02-03 RX ADMIN — METHOCARBAMOL 1000 MG: 500 TABLET ORAL at 06:02

## 2025-02-03 NOTE — ED PROVIDER NOTES
Encounter Date: 2/3/2025       History     Chief Complaint   Patient presents with    Leg Swelling     Pt reports bilateral leg swelling and pain since Friday. Pt reports hx of blood clots so her doctor told her to come to the ED. Pt reports being on Eliquis.      The history is provided by the patient and medical records.   59-year-old female past medical history of asthma, depression, fibromyalgia, hypertension, DVT on chronic anticoagulation of Eliquis presenting to the emergency department today for evaluation of bilateral leg swelling and pain since Friday.  Patient states on Thursday she drove from [place] to [place] to attend a .  States symptoms began on Friday reports while in [place] did some excessive walking.  Patient states bilateral leg pain is all over her legs on the front and back and states they feel more swollen. Also endorses low back pain. Patient states she has been taking 2 Tylenol every 2 hours for the past 3 days for her pain without resolution of symptoms. No other complaints, denies fever, CP, SOB, V/D, abdominal pain, hematuria, urinary complaints, or other associated symptoms.   Review of patient's allergies indicates:   Allergen Reactions    Penicillins Itching     Past Medical History:   Diagnosis Date    Asthma     Brain mass     Depression     Fibromyalgia     Hypertension     Seizures     fell and hit head at grocery        Past Surgical History:   Procedure Laterality Date    CARPAL TUNNEL RELEASE      right     SECTION      x 3    cysto/ureteroscopy stent placement      CYSTOSCOPY W/ URETERAL STENT PLACEMENT Left 2020    Procedure: CYSTOSCOPY, WITH URETERAL STENT INSERTION;  Surgeon: Yessi Granados MD;  Location: [facility] OR;  Service: Urology;  Laterality: Left;    HYSTERECTOMY      RETROGRADE PYELOGRAPHY N/A 2020    Procedure: PYELOGRAM, RETROGRADE;  Surgeon: Yessi Granados MD;  Location: [facility] OR;  Service: Urology;  Laterality:  N/A;    TONSILLECTOMY      URETEROSCOPIC REMOVAL OF URETERIC CALCULUS Left 7/31/2020    Procedure: Cystoscopy, possible retrograde pyelogram, ureteroscopy with laser lithotripsy, placement of ureteral stent;  Surgeon: Yessi Granados MD;  Location: [facility];  Service: Urology;  Laterality: Left;  RN PREOP 7/29/2020---COVID NEGATIVE ON 7/29     Family History   Problem Relation Name Age of Onset    Cancer Mother          Breast cancer    Heart disease Mother      Diabetes Father      Cancer Maternal Grandmother          Lung Cancer    Cancer Maternal Grandfather          Lung Cancer    Cancer Paternal Grandmother      Cancer Paternal Grandfather       Social History     Tobacco Use    Smoking status: Never    Smokeless tobacco: Never   Substance Use Topics    Alcohol use: Yes     Comment: occasionally    Drug use: No     Review of Systems   Constitutional:  Negative for chills, diaphoresis, fatigue and fever.   HENT:  Negative for congestion, rhinorrhea and sore throat.    Eyes:  Negative for redness and visual disturbance.   Respiratory:  Negative for cough and shortness of breath.    Cardiovascular:  Positive for leg swelling. Negative for chest pain and palpitations.   Gastrointestinal:  Negative for abdominal pain, diarrhea, nausea and vomiting.   Genitourinary:  Negative for difficulty urinating, dysuria, flank pain and frequency.   Musculoskeletal:  Positive for back pain (low) and myalgias (bilateral LE pain). Negative for arthralgias, neck pain and neck stiffness.   Skin:  Negative for rash.   Neurological:  Negative for dizziness, weakness, light-headedness and headaches.   Hematological:  Does not bruise/bleed easily.       Physical Exam     Initial Vitals [02/03/25 1307]   BP Pulse Resp Temp SpO2   121/64 80 18 97.8 °F (36.6 °C) 98 %      MAP       --         Physical Exam    Nursing note and vitals reviewed.  Constitutional: She appears well-developed and well-nourished. She is not diaphoretic. No  distress.   HENT:   Head: Normocephalic and atraumatic.   Right Ear: External ear normal.   Left Ear: External ear normal.   Nose: Nose normal. Mouth/Throat: Oropharynx is clear and moist.   Eyes: Conjunctivae and EOM are normal. Pupils are equal, round, and reactive to light. Right eye exhibits no discharge. Left eye exhibits no discharge.   Neck: Neck supple.   Normal range of motion.   Full passive range of motion without pain.     Cardiovascular:  Normal rate, regular rhythm, normal heart sounds and normal pulses.     Exam reveals no distant heart sounds and no friction rub.       Pulmonary/Chest: Effort normal and breath sounds normal. No respiratory distress.   Abdominal: Abdomen is soft. Bowel sounds are normal. She exhibits no distension, no pulsatile midline mass and no mass. There is no splenomegaly or hepatomegaly. There is no abdominal tenderness.   No right CVA tenderness.  No left CVA tenderness. There is no rebound and no guarding.   Musculoskeletal:         General: Normal range of motion.      Right shoulder: Normal.      Left shoulder: Normal.      Right elbow: Normal.      Left elbow: Normal.      Right wrist: Normal.      Left wrist: Normal.      Right hand: Normal.      Left hand: Normal.      Cervical back: Normal, full passive range of motion without pain, normal range of motion and neck supple.      Thoracic back: Normal.      Lumbar back: Normal.      Right hip: Normal.      Left hip: Normal.      Right upper leg: Tenderness present. No swelling or edema.      Left upper leg: Tenderness present. No swelling or edema.      Right knee: Normal. No swelling.      Left knee: Normal. No swelling.      Right lower leg: Tenderness present. No swelling, deformity or bony tenderness. No edema.      Left lower leg: Tenderness present. No swelling, deformity or bony tenderness. No edema.      Right ankle: Normal.      Left ankle: Normal.      Right foot: Normal. Normal range of motion. No swelling.       Left foot: Normal. Normal range of motion. No swelling.      Comments: Patient was full range of motion of both upper and lower extremities bilaterally with 5/5 strength 2+ distal pulses neurovascularly intact.  That has no tenderness of the cervical, thoracic or lumbar spine.  That has bilateral lower lumbar muscular tenderness with no overlying skin changes.  Negative straight leg test bilaterally.    I do not appreciate any swelling or edema to the bilateral lower extremities.  Patient does endorse generalized tenderness to the entire bilateral lower extremities from the thighs to the feet.     Neurological: She is alert and oriented to person, place, and time. She has normal strength. No cranial nerve deficit or sensory deficit. Gait normal.   Skin: Skin is warm and dry. Capillary refill takes less than 2 seconds. No bruising, no ecchymosis and no rash noted. No pallor.   Psychiatric: She has a normal mood and affect. Her speech is normal and behavior is normal. Thought content normal.         ED Course   Procedures  Labs Reviewed   B-TYPE NATRIURETIC PEPTIDE - Abnormal       Result Value     (*)    CBC W/ AUTO DIFFERENTIAL - Abnormal    WBC 8.27      RBC 4.79      Hemoglobin 14.2      Hematocrit 44.0      MCV 92      MCH 29.6      MCHC 32.3      RDW 12.9      Platelets 252      MPV 10.7      Immature Granulocytes 0.6 (*)     Gran # (ANC) 4.5      Immature Grans (Abs) 0.05 (*)     Lymph # 3.0      Mono # 0.4      Eos # 0.2      Baso # 0.05      nRBC 0      Gran % 54.8      Lymph % 36.5      Mono % 5.2      Eosinophil % 2.3      Basophil % 0.6      Differential Method Automated     ACETAMINOPHEN LEVEL - Abnormal    Acetaminophen (Tylenol), Serum <3.0 (*)    SALICYLATE LEVEL - Abnormal    Salicylate Lvl <5.0 (*)    COMPREHENSIVE METABOLIC PANEL    Sodium 139      Potassium 4.0      Chloride 107      CO2 24      Glucose 88      BUN 15      Creatinine 0.8      Calcium 9.5      Total Protein 7.0      Albumin  3.9      Total Bilirubin 0.4      Alkaline Phosphatase 59      AST 13      ALT 11      eGFR >60      Anion Gap 8     APTT    aPTT 26.1     PROTIME-INR    Prothrombin Time 11.2      INR 1.0     CK    CPK 43     SALICYLATE LEVEL   URINALYSIS, REFLEX TO URINE CULTURE          Imaging Results              X-Ray Lumbar Spine Ap And Lateral (Final result)  Result time 02/03/25 18:17:53      Final result by Eda Palafox MD (02/03/25 18:17:53)                   Impression:      No acute bony abnormality detected.  Degenerative changes greatest at the lower lumbar spine.  Grade 1 anterolisthesis of L5 on S1.      Electronically signed by: Eda Palafox  Date:    02/03/2025  Time:    18:17               Narrative:    EXAMINATION:  LUMBAR SPINE    CLINICAL HISTORY:  Low back pain.    TECHNIQUE:  AP, lateral, and coned lateral views of the lower lumbar spine were submitted.    COMPARISON:  None.    FINDINGS:  There is normal alignment of the lumbar spine. There is no acute fracture.  There is grade 1 anterolisthesis of L4 on L5.  The bones appear to be normally mineralized.  Advanced facet arthrosis is seen at L4/L5 and L5/S1.  Small marginal osteophytes are present.                                       US Lower Extremity Veins Bilateral (Final result)  Result time 02/03/25 16:26:58      Final result by Nabor Alves MD (02/03/25 16:26:58)                   Impression:      1. Chronic thrombosis of the left proximal superficial femoral vein.      Electronically signed by: Nabor Alves MD  Date:    02/03/2025  Time:    16:26               Narrative:    EXAMINATION:  US LOWER EXTREMITY VEINS BILATERAL    CLINICAL HISTORY:  Pain in right leg    TECHNIQUE:  Duplex and color flow Doppler and dynamic compression was performed of the bilateral lower extremity veins was performed.    COMPARISON:  Ultrasound 02/01/2024.    FINDINGS:  Right thigh veins: The common femoral, femoral, popliteal, upper greater saphenous,  and deep femoral veins are patent and free of thrombus. The veins are normally compressible and have normal phasic flow and augmentation response.    Right calf veins: The visualized calf veins are patent.    Left thigh veins: Thrombosis of the proximal superficial femoral vein.  The common femoral, mid to distal superficial femoral, popliteal, upper greater saphenous, and deep femoral veins are patent and free of thrombus.    Left calf veins: The visualized calf veins are patent.    Miscellaneous: None                                       Medications   oxyCODONE immediate release tablet 5 mg (5 mg Oral Given 2/3/25 1705)   methocarbamoL tablet 1,000 mg (1,000 mg Oral Given 2/3/25 182)     Medical Decision Making  59-year-old female past medical history of asthma, depression, fibromyalgia, hypertension, DVT on chronic anticoagulation of Eliquis presenting to the emergency department today for evaluation of bilateral leg swelling and pain since Friday.  Patient states on Thursday she drove from [place] to [place] to attend a .  States symptoms began on Friday reports while in [place] did some excessive walking.  Patient states bilateral leg pain is all over her legs on the front and back and states they feel more swollen. Also endorses low back pain. Patient states she has been taking 2 Tylenol every 2 hours for the past 3 days for her pain without resolution of symptoms. No other complaints, denies fever, CP, SOB, V/D, abdominal pain, hematuria, urinary complaints, or other associated symptoms.   Patient's chart and medical history reviewed.  Patient's vitals reviewed.  They are afebrile, no respiratory distress, nontoxic-appearing in the ED.  Differential diagnosis is considered DVT, arterial occulusion, claudication, as well as the following.  - MSK strain: considered with back pain with muscular tenderness  - Pyelonephritis: unlikely with no CVA tenderness or fever or urinary complaints   - Spinal  Fx: unlikely with normal neurovascular exam, no step-off or deformity on exam, no spinal TTP.  - Discitis/Spinal abscess/Osteomyelitis: unlikely with no risk factors including current immunosuppression, hemodialysis, current or recent injection drug use, current or recent invasive epidural/spinal procedure, current or recent endocarditis or bacteremia. no spinal tenderness, no fever. no neuro deficits.  - Cauda Equina: unlikely with no saddle anesthesia, urinary or bowel retention or incontinence.  - stenosis/herniation: considered with back pain however negative straight leg test bilaterally.  Patient was ultrasound with chronic appearing DVT to the superficial left femoral artery unchanged from previous imaging.  Bilateral muscular pain and low back pain radiating down both legs could be related to arthritis and facet arthrosis findings on x-ray of the lumbar spine and accommodation of patient's fibromyalgia with muscular tenderness.  Normal CK.  Patient was endorsing some improvement of pain after oxycodone and Robaxin.  Advised patient to follow up with the primary care provider in 1-2 days for re-evaluation of her pain we will discharged home with muscle relaxers and lidocaine patches.  Also found patient was BNP slightly elevated and will refer to Cardiology for re-evaluation in echo.  Patient agrees with the plan does not have any questions at this time.    At this time I'll discharge home to follow up with primary care physician in the next 1-2 days for further evaluation.  If the pain continues the pt will need to see cardiology for further evaluation.  The patient is comfortable with this plan and comfortable going home at this time. After taking into careful account the historical factors and physical exam findings of the patient's presentation today, in conjunction with the empirical and objective data obtained on ED workup, no acute emergent medical condition has been identified. The patient appears to  be low risk for an emergent medical condition and I feel it is safe and appropriate at this time for the patient to be discharged to follow-up as detailed in their discharge instructions for reevaluation and possible continued outpatient workup and management. I have discussed the specifics of the workup with the patient and the patient has verbalized understanding of the details of the workup, the diagnosis, the treatment plan, and the need for outpatient follow-up.  Although the patient has no emergent etiology today this does not preclude the development of an emergent condition so in addition, I have advised the patient that they can return to the ED and/or activate EMS at any time with worsening of their symptoms, change of their symptoms, or with any other medical complaint.  The patient remained comfortable and stable during their visit in the ED.  Discharge and follow-up instructions discussed with the patient who expressed understanding and willingness to comply with my recommendations. I discussed with the patient/family the diagnosis, treatment plan, indications for return to the emergency department, and for expected follow-up. Please follow up with your primary doctor in 1-2 days and return to the ED in any new, worsening, or continued symptoms. The patient/family verbalized an understanding. The patient/family is asked if there are any questions or concerns. We discuss the case, until all issues are addressed to the patient/family's satisfaction. Patient/family understands and is agreeable to the plan.    NOAH LUJAN PA-C.    DISCLAIMER: This note was prepared with [employer] voice recognition transcription software. Garbled syntax, mangled pronouns, and other bizarre constructions may be attributed to that software system.      Amount and/or Complexity of Data Reviewed  Labs: ordered. Decision-making details documented in ED Course.  Radiology: ordered. Decision-making details documented in ED  Course.    Risk  Prescription drug management.               ED Course as of 02/03/25 1831   Mon Feb 03, 2025   1539 07/20/2020 per chart review echo was 55% ejection fraction [AF]   1539 SpO2: 98 % [AF]   1539 Resp: 18 [AF]   1539 Pulse: 80 [AF]   1539 Temp: 97.8 °F (36.6 °C) [AF]   1539 BP: 121/64 [AF]   1708 Comprehensive metabolic panel  No SINCERE.  No electrolyte abnormalities. [AF]   1708 CBC auto differential(!)  Normal H&H.  No leukocytosis. [AF]   1713 US Lower Extremity Veins Bilateral  Chronic thrombosis of the left proximal superficial femoral vein. No new thrombosis [AF]   1719 BNP(!): 179  Mildly elevated will have patient follow up with cardiology for re-evaluation.   [AF]   1737 CPK: 43 [AF]      ED Course User Index  [AF] Conor Baker PA-C                           Clinical Impression:  Final diagnoses:  [M79.604, M79.605] Leg pain, bilateral  [R79.89] Elevated brain natriuretic peptide (BNP) level  [M54.50] Acute bilateral low back pain, unspecified whether sciatica present (Primary)  [M79.7] Fibromyalgia muscle pain          ED Disposition Condition    Discharge Stable          ED Prescriptions       Medication Sig Dispense Start Date End Date Auth. Provider    methocarbamoL (ROBAXIN) 500 MG Tab Take 2 tablets (1,000 mg total) by mouth 3 (three) times daily. for 5 days 30 tablet 2/3/2025 2/8/2025 Conor Baker PA-C    LIDOcaine (LIDODERM) 5 % Place 1 patch onto the skin once daily. Apply patch for 12 hours and then leave off for 12 hours 15 patch 2/3/2025 -- Conor Baker PA-C          Follow-up Information       Follow up With Specialties Details Why Contact Info    Ky Mann MD General Practice, Internal Medicine Schedule an appointment as soon as possible for a visit in 1 day for follow up [address]  [address]  [facility] [address]  [phone]      [facility] Emergency Dept Emergency Medicine Go to  If symptoms worsen [address] [facility] [address]  [phone]             Conor Baker PA-C  02/03/25 4650

## 2025-02-04 NOTE — DISCHARGE INSTRUCTIONS
Thank you for coming to our Emergency Department today. It is important to remember that some problems or medical conditions are difficult to diagnose and may not be found or addressed during your Emergency Department visit.  These conditions often start with non-specific symptoms and can only be diagnosed on follow up visits with your primary care physician or specialist when the symptoms continue or change. Please remember that all medical conditions can change, and we cannot predict how you will be feeling tomorrow or the next day. Return to the ER with any questions/concerns, new/concerning symptoms including fever, chest pain, shortness of breath, loss of consciousness, dizziness, weakness, worsening symptoms, failure to improve, or any other concerns. Also, please follow up with your Primary Care Physician and/or Pediatrician in the next 1-2 days to review your ED visit in entirety and for re-evaluation.   Be sure to follow up with your primary care doctor and review all labs/imaging/tests that were performed during your ER visit with them. It is very common for us to identify non-emergent incidental findings which must be followed up with your primary care physician.  Some labs/imaging/tests may be outside of the normal range, and require non-emergent follow-up and/or further investigation/treatment/procedures/testing to help diagnose/exclude/prevent complications or other potentially serious medical conditions. Some abnormalities may not have been discussed or addressed during your ER visit. Some lab results may not return during your ER visit but can be accessible by downloading the free [facility] ira or by visiting [email or web address] . It is important for you to review all labs/imaging/tests which are outside of the normal range with your physician.  An ER visit does not replace a primary care visit, and many screening tests or follow-up tests cannot be ordered by an ER doctor or performed by  the ER. Some tests may even require pre-approval.  If you do not have a primary care doctor, you may contact the one listed on your discharge paperwork or you may also call the [facility] Appointment Desk at [phone] , or [address] [place] [address] at  [phone] to schedule an appointment, or establish care with a primary care doctor or even a specialist and to obtain information about local resources. It is important to your health that you have a primary care doctor.  Please take all medications as directed. We have done our best to select a medication for you that will treat your condition however, all medications may potentially have side-effects and it is impossible to predict which medications may give you side-effects or what those side-effects (if any) those medications may give you.  If you feel that you are having a negative effect or side-effect of any medication you should stop taking those medications immediately and seek medical attention. If you feel that you are having a life-threatening reaction call 911.  Do not drive, swim, climb to height, take a bath, operate heavy machinery, drink alcohol or take potentially sedating medications, sign any legal documents or make any important decisions for 24 hours if you have received any pain medications, sedatives or mood altering drugs during your ER visit or within 24 hours of taking them if they have been prescribed to you.   You can find additional resources for Dentists, hearing aids, durable medical equipment, low cost pharmacies and other resources at [email or web address]  Patient agrees with this plan. Discussed with her strict return precautions, they verbalized understanding. Patient is stable for discharge.   § Please take all medication as prescribed.

## 2025-02-06 ENCOUNTER — OFFICE VISIT (OUTPATIENT)
Dept: CARDIOLOGY | Facility: CLINIC | Age: 60
End: 2025-02-06
Payer: COMMERCIAL

## 2025-02-06 VITALS
OXYGEN SATURATION: 96 % | DIASTOLIC BLOOD PRESSURE: 73 MMHG | HEART RATE: 97 BPM | SYSTOLIC BLOOD PRESSURE: 127 MMHG | HEIGHT: 64 IN | BODY MASS INDEX: 29.28 KG/M2 | WEIGHT: 171.5 LBS | RESPIRATION RATE: 16 BRPM

## 2025-02-06 DIAGNOSIS — R07.9 CHEST PAIN, UNSPECIFIED TYPE: Primary | ICD-10-CM

## 2025-02-06 DIAGNOSIS — I82.4Y9 DEEP VEIN THROMBOSIS (DVT) OF PROXIMAL LOWER EXTREMITY, UNSPECIFIED CHRONICITY, UNSPECIFIED LATERALITY: ICD-10-CM

## 2025-02-06 DIAGNOSIS — R79.89 ELEVATED BRAIN NATRIURETIC PEPTIDE (BNP) LEVEL: ICD-10-CM

## 2025-02-06 DIAGNOSIS — I82.402 LEG DVT (DEEP VENOUS THROMBOEMBOLISM), ACUTE, LEFT: ICD-10-CM

## 2025-02-06 DIAGNOSIS — I50.9 CONGESTIVE HEART FAILURE, UNSPECIFIED HF CHRONICITY, UNSPECIFIED HEART FAILURE TYPE: ICD-10-CM

## 2025-02-06 PROCEDURE — 99999 PR PBB SHADOW E&M-EST. PATIENT-LVL V: CPT | Mod: PBBFAC,,, | Performed by: INTERNAL MEDICINE

## 2025-02-06 PROCEDURE — 99214 OFFICE O/P EST MOD 30 MIN: CPT | Mod: S$GLB,,, | Performed by: INTERNAL MEDICINE

## 2025-02-06 PROCEDURE — 3078F DIAST BP <80 MM HG: CPT | Mod: CPTII,S$GLB,, | Performed by: INTERNAL MEDICINE

## 2025-02-06 PROCEDURE — 4010F ACE/ARB THERAPY RXD/TAKEN: CPT | Mod: CPTII,S$GLB,, | Performed by: INTERNAL MEDICINE

## 2025-02-06 PROCEDURE — 1159F MED LIST DOCD IN RCRD: CPT | Mod: CPTII,S$GLB,, | Performed by: INTERNAL MEDICINE

## 2025-02-06 PROCEDURE — G2211 COMPLEX E/M VISIT ADD ON: HCPCS | Mod: S$GLB,,, | Performed by: INTERNAL MEDICINE

## 2025-02-06 PROCEDURE — 3074F SYST BP LT 130 MM HG: CPT | Mod: CPTII,S$GLB,, | Performed by: INTERNAL MEDICINE

## 2025-02-06 PROCEDURE — 3008F BODY MASS INDEX DOCD: CPT | Mod: CPTII,S$GLB,, | Performed by: INTERNAL MEDICINE

## 2025-02-06 RX ORDER — SPIRONOLACTONE 25 MG/1
25 TABLET ORAL DAILY
Qty: 90 TABLET | Refills: 3 | Status: SHIPPED | OUTPATIENT
Start: 2025-02-06 | End: 2025-02-21

## 2025-02-06 RX ORDER — METOPROLOL SUCCINATE 25 MG/1
25 TABLET, EXTENDED RELEASE ORAL DAILY
Qty: 90 TABLET | Refills: 3 | Status: SHIPPED | OUTPATIENT
Start: 2025-02-06

## 2025-02-06 RX ORDER — FUROSEMIDE 20 MG/1
20 TABLET ORAL
Qty: 90 TABLET | Refills: 3 | Status: SHIPPED | OUTPATIENT
Start: 2025-02-06

## 2025-02-06 RX ORDER — SACUBITRIL AND VALSARTAN 24; 26 MG/1; MG/1
1 TABLET, FILM COATED ORAL 2 TIMES DAILY
Qty: 180 TABLET | Refills: 3 | Status: SHIPPED | OUTPATIENT
Start: 2025-02-06

## 2025-02-06 NOTE — PROGRESS NOTES
CARDIOVASCULAR CONSULTATION    REASON FOR CONSULT:   Myra Harrison is a 59 y.o. female who presents for   Chief Complaint   Patient presents with    Follow-up     HISTORY OF PRESENT ILLNESS:     Patient is a pleasant 58-year-old lady.  Had knee surgery done last year.  After that DVT of left femoral vein.  Has had multiple ultrasounds done since then which have revealed partially occlusive DVT of left femoral vein, including 1 done recently at [facility].  She has musculoskeletal myalgias and bilateral leg pains which occur from the thighs all the way down to the feet.  No particular aggravating or relieving factors.  She has brought a she would from her primary care physician requesting a CTA angiogram of the legs to check for circulation.  She did have CT angiogram of her lungs performed in November which did not reveal any pulmonary embolism.  Denies orthopnea PND.  Does have chronic cough.      Impression:     1. No evidence of PE.  2. No acute intrathoracic abnormalities identified.  3. Stable 1.2 cm right lower lobe pulmonary nodule, requiring no follow-up as appears stable compared to remote CT from 2015 and likely reflecting benign etiology.    Impression:     Suspected ongoing nonocclusive thrombus in the left femoral vein.  No DVT in the right lower extremity.       Impression:     Unchanged appearance of nonocclusive thrombus within the proximal to mid aspect of the left femoral vein.        CONCLUSIONS     Normal left ventricular systolic function.     Calculated left ventricular ejection fraction by 2D tracking is 60 %.     Normal diastolic function.     There were no regional wall motion abnormalities.     Indeterminate right ventricular systolic function.       - TAPSE 12.4mm, S' 10.3cm/s.       - Normal Right ventricular systolic pressure 26.2 mmHg.     Normal atrial dimensions.     Normal cardiac valve structures.     Normal doppler study - trace MR, TR, MA only.    Findings:     Right  CFV: Patent   Right GSV: Patent   Right PFV: Patent   Right SFV Prox: Patent   Right SFV Mid: Patent   Right SFV Distal: Patent   Right Popliteal: Patent   Right Peroneal: Patent   Right PTV: Patent     Left CFV: Patent   Left GSV: Patent   Left PFV: Patent   Left SFV Prox: Partially Occluded   Left SFV Mid: Patent   Left SFV Distal: Patent   Left Popliteal: Patent   Left Peroneal: Patent   Left PTV: Patent      Notes from April 2024: Patient here for follow-up.  Denies any chest pains at rest on exertion, orthopnea, PND.  Has been doing fine.  Main complaint is chronic dyspnea on exertion which has been going on for many years      02/25/2021 Dobutamine Stress Test  Findings:   The left ventricle is normal in size.   Post stress SPECT images demonstrate normal perfusion in all regions. The rest images reveal no reversibility. Gated SPECT imaging demonstrates normal wall motion in all regions with a left ventricular ejection fraction estimated to be 68.00%.      Impression:  Stress ECG portion was negative for ischemia.   The overall study quality is good.   Normal perfusion scan with no evidence of inducible ischemia or areas of infarct      There is no prior study for comparison    November 21st 2024    History of Present Illness    CHIEF COMPLAINT:  Myra presents today for leg pain.    LEG PAIN:  She reports constant pain in both legs. The exact location within the legs and specific cause are not identified.    CHEST DISCOMFORT:  She experiences occasional chest pain, including a cramp during the EKG procedure. She denies constant chest pressure or pain related to specific activities such as walking or lying down.    CIRCULATION:  Recent venous test revealed a chronic clot in the superficial veins, which may be causing inflammation and pain. No other circulatory concerns were noted.    MEDICATIONS:  She continues adherence to prescribed blood thinners.    WEIGHT:  She reports being overweight.         Recent  ultrasound done in 2024 demonstrated no evidence of right lower extremity DVT, left common femoral vein was patent and left superficial femoral proximal vein at partial compressibility with chronic thrombus as well as left superficial femoral middle vein had partial compressible lip 80 with chronic thrombus    Arterial ultrasound showed no significant flow-limiting stenosis    :    History of Present Illness    CHIEF COMPLAINT:  Myra presents today for fatigue, leg pain, and back pain.    CURRENT SYMPTOMS:  She reports fatigue with generalized weakness. She experiences pain in both legs and back. She reports chest pain occurring multiple times daily, varying in location from center to left side of chest, upper chest, and back with radiation under the arm. She experiences shortness of breath. She has a persistent cough despite taking allergy medications and Albuterol.    RECENT ER VISIT:  She reports an ER visit on Monday where BNP was elevated. She received oral medication and IV placement without medication administration.    FLUID RETENTION:  She reports fluid retention despite being on Triamterene, noting she is unable to wear her wedding ring.    MEDICAL HISTORY:  She has a history of blood clots  with confirmed involvement of the left leg.    CURRENT MEDICATIONS:  She takes eliquis 5 mg twice daily, triamterene hydrochlorothiazide, Albuterol, allergy medications, and Crestor. She reports taking Lisinopril, though dosage is unclear and medication is not on official medication list.         PAST MEDICAL HISTORY:     Past Medical History:   Diagnosis Date    Asthma     Brain mass     Depression     Fibromyalgia     Hypertension     Seizures     fell and hit head at grocery          PAST SURGICAL HISTORY:     Past Surgical History:   Procedure Laterality Date    CARPAL TUNNEL RELEASE      right     SECTION      x 3    cysto/ureteroscopy stent placement      CYSTOSCOPY W/  URETERAL STENT PLACEMENT Left 7/20/2020    Procedure: CYSTOSCOPY, WITH URETERAL STENT INSERTION;  Surgeon: Yessi Granados MD;  Location: [facility] OR;  Service: Urology;  Laterality: Left;    HYSTERECTOMY      RETROGRADE PYELOGRAPHY N/A 7/20/2020    Procedure: PYELOGRAM, RETROGRADE;  Surgeon: Yessi Granados MD;  Location: [facility] OR;  Service: Urology;  Laterality: N/A;    TONSILLECTOMY      URETEROSCOPIC REMOVAL OF URETERIC CALCULUS Left 7/31/2020    Procedure: Cystoscopy, possible retrograde pyelogram, ureteroscopy with laser lithotripsy, placement of ureteral stent;  Surgeon: Yessi Granados MD;  Location: [facility] OR;  Service: Urology;  Laterality: Left;  RN PREOP 7/29/2020---COVID NEGATIVE ON 7/29           SOCIAL HISTORY:     Social History     Socioeconomic History    Marital status:    Tobacco Use    Smoking status: Never    Smokeless tobacco: Never   Substance and Sexual Activity    Alcohol use: Yes     Comment: occasionally    Drug use: No    Sexual activity: Yes       FAMILY HISTORY:     Family History   Problem Relation Name Age of Onset    Cancer Mother          Breast cancer    Heart disease Mother      Diabetes Father      Cancer Maternal Grandmother          Lung Cancer    Cancer Maternal Grandfather          Lung Cancer    Cancer Paternal Grandmother      Cancer Paternal Grandfather         REVIEW OF SYSTEMS:   Review of Systems   Constitutional: Negative.   HENT: Negative.     Eyes: Negative.    Cardiovascular:  Positive for chest pain, dyspnea on exertion and leg swelling.   Respiratory:  Positive for shortness of breath.    Endocrine: Negative.    Hematologic/Lymphatic: Negative.    Skin: Negative.    Musculoskeletal:  Positive for joint pain and myalgias.   Gastrointestinal: Negative.    Genitourinary: Negative.    Neurological: Negative.    Psychiatric/Behavioral: Negative.     Allergic/Immunologic: Negative.        A 10 point review of systems was performed and all the  "pertinent positives have been mentioned. Rest of review of systems was negative.        PHYSICAL EXAM:     Vitals:    02/06/25 1501   BP: 127/73   Pulse: 97   Resp: 16    Body mass index is 29.44 kg/m².  Weight: 77.8 kg (171 lb 8.3 oz)   Height: 5' 4" (162.6 cm)     Physical Exam  Constitutional:       Appearance: Normal appearance. She is well-developed.   HENT:      Head: Normocephalic.   Eyes:      Pupils: Pupils are equal, round, and reactive to light.   Cardiovascular:      Rate and Rhythm: Normal rate and regular rhythm.   Pulmonary:      Effort: Pulmonary effort is normal.      Breath sounds: Normal breath sounds.   Abdominal:      General: Bowel sounds are normal.      Palpations: Abdomen is soft.      Tenderness: There is no abdominal tenderness.   Musculoskeletal:         General: Normal range of motion.      Cervical back: Normal range of motion and neck supple.   Skin:     General: Skin is warm.   Neurological:      Mental Status: She is alert and oriented to person, place, and time.           DATA:     Laboratory:  CBC:  Recent Labs   Lab 02/01/24  1244 05/01/24  1158 02/03/25  1622   WBC 5.90 7.97 8.27   Hemoglobin 13.7 14.8 14.2   Hematocrit 42.7 44.1 44.0   Platelets 263 307 252       CHEMISTRIES:  Recent Labs   Lab 08/31/22  1155 06/13/23  1143 08/28/23  1100 12/29/23  1128 02/01/24  1244 02/03/25  1622   Glucose  --   --    < > 93 98 88   Sodium 140 134 L   < > 141 144 139   Potassium 3.7 3.7   < > 4.2 4.3 4.0   BUN 15.9 20.5 H   < > 10 13 15   Blood Urea Nitrogen  --   --    < >  --   --   --    Creatinine 0.88 0.99   < > 0.8 0.7 0.8   eGFR  85 L 67 L  --   --   --   --    Calcium 10.4 H 10.2 H   < > 10.2 9.5 9.5    < > = values in this interval not displayed.       CARDIAC BIOMARKERS:  Recent Labs   Lab 11/15/23  1657 12/29/23  1128 02/01/24  1244 02/03/25  1623   CPK  --   --  47 43   Troponin I <0.006 <0.006  --   --        LIYAH:  Recent Labs   Lab 02/01/24  1244 02/03/25  1622 " "  INR 1.0 1.0       LIPIDS/LFTS:  Recent Labs   Lab 12/29/23  1128 02/01/24  1244 02/03/25  1622   AST 17 21 13   ALT 16 19 11       No results found for: "HGBA1C"    TSH        The ASCVD Risk score (Camryn DK, et al., 2019) failed to calculate for the following reasons:    Cannot find a previous HDL lab    Cannot find a previous total cholesterol lab       BNP    Lab Results   Component Value Date/Time     (H) 02/03/2025 04:22 PM    BNP 19 12/29/2023 11:28 AM    BNP 19 11/15/2023 04:57 PM            ECHO    Results for orders placed during the hospital encounter of 07/19/20    Echo Color Flow Doppler? Yes    Interpretation Summary  · Normal left ventricular systolic function. The estimated ejection fraction is 55%.  · Normal LV diastolic function.  · Normal right ventricular systolic function.  · No pulmonary hypertension present.      STRESS TEST    No results found for this or any previous visit.        CATH    No results found for this or any previous visit.              ASSESSMENT AND PLAN     Patient Active Problem List   Diagnosis    Nephrolithiasis    Acute post-traumatic headache, not intractable    Chronic pain disorder    Fibromyalgia    Bacteremia due to Escherichia coli    E. coli UTI    Possible COVID-19 Infection    Essential hypertension    Seizure disorder    Asthma    MDD (major depressive disorder)    Ureteral stone    Left ureteral calculus    Leg DVT (deep venous thromboembolism), acute, left    Anticoagulated         ALLERGIES AND MEDICATION:     Review of patient's allergies indicates:   Allergen Reactions    Penicillins Itching        Medication List            Accurate as of February 6, 2025  4:01 PM. If you have any questions, ask your nurse or doctor.                START taking these medications      furosemide 20 MG tablet  Commonly known as: LASIX  Take 1 tablet (20 mg total) by mouth as needed (1-2 pills as needed for fluid overload).  Started by: Candelario Chauhan MD     metoprolol " succinate 25 MG 24 hr tablet  Commonly known as: TOPROL-XL  Take 1 tablet (25 mg total) by mouth once daily.  Started by: Candelario Chauhan MD     sacubitriL-valsartan 24-26 mg per tablet  Commonly known as: ENTRESTO  Take 1 tablet by mouth 2 (two) times daily.  Started by: Candelario Chauhan MD     spironolactone 25 MG tablet  Commonly known as: ALDACTONE  Take 1 tablet (25 mg total) by mouth once daily.  Started by: Candelario Chauhan MD            CONTINUE taking these medications      albuterol sulfate 2.5 mg/0.5 mL Nebu  Take 2.5 mg by nebulization every 4 (four) hours as needed (wheeze). Rescue     ELIQUIS 5 mg Tab  Generic drug: apixaban  Take 1 tablet (5 mg total) by mouth 2 (two) times daily.     ergocalciferol 50,000 unit Cap  Commonly known as: ERGOCALCIFEROL     fluticasone-salmeterol 250-50 mcg/dose 250-50 mcg/dose diskus inhaler  Commonly known as: ADVAIR     gabapentin 300 MG capsule  Commonly known as: NEURONTIN     LIDOcaine 5 %  Commonly known as: LIDODERM  Place 1 patch onto the skin once daily. Apply patch for 12 hours and then leave off for 12 hours     methocarbamoL 500 MG Tab  Commonly known as: Robaxin  Take 2 tablets (1,000 mg total) by mouth 3 (three) times daily. for 5 days     ondansetron 4 MG tablet  Commonly known as: ZOFRAN  Take 1 tablet (4 mg total) by mouth every 8 (eight) hours as needed.     * ondansetron 4 MG Tbdl  Commonly known as: ZOFRAN-ODT  Take 1 tablet (4 mg total) by mouth every 6 (six) hours as needed (Nausea).     * ondansetron 4 MG Tbdl  Commonly known as: ZOFRAN-ODT  Take 1 tablet (4 mg total) by mouth every 6 (six) hours as needed.     oxybutynin 5 MG Tab  Commonly known as: DITROPAN  Take 1 tablet (5 mg total) by mouth 3 (three) times daily as needed (bladder spasms).     pantoprazole 20 MG tablet  Commonly known as: PROTONIX  Take 1 tablet (20 mg total) by mouth once daily.     UNABLE TO FIND           * This list has 2 medication(s) that are the same as other medications  prescribed for you. Read the directions carefully, and ask your doctor or other care provider to review them with you.                STOP taking these medications      triamterene-hydrochlorothiazide 37.5-25 mg 37.5-25 mg per capsule  Commonly known as: DYAZIDE  Stopped by: Candelario Chauhan MD               Where to Get Your Medications        These medications were sent to [facility]/pharmacy #[id number] - [place] - [address]  [address] [place] [address]      Phone: [phone]   furosemide 20 MG tablet  metoprolol succinate 25 MG 24 hr tablet  sacubitriL-valsartan 24-26 mg per tablet  spironolactone 25 MG tablet         Orders Placed This Encounter   Procedures    Basic Metabolic Panel    Nuclear Stress - Cardiology Interpreted    Echo       Assessment & Plan    IMPRESSION:  Evaluated elevated BNP (179, previously 19) and symptoms of shortness of breath as marker for potential heart failure  Adjusted heart failure medications based on current symptoms and lab results  Chest pain etiology varies, including center, left side, and back    PLAN SUMMARY:  - Started Metoprolol, Entresto, Jardiance, Lasix, Spironolactone, and Toprol XL  - Discontinued Lisinopril and Triamterene HCTZ  - Continued Crestor  - Ordered stress test and echocardiogram  - Labs in 1 week to assess medication tolerance  - Return to ED if symptoms persist or worsen    HEART FAILURE:  - Discussed symptoms of heart failure, including SOB and fatigue.  - Myra to monitor daily weight and take additional Lasix if weight increases by more than 3 lbs.  - Started Entresto - begin 2 days after last Lisinopril dose.  - Started Spironolactone.  - Started Metoprolol.  - Started Toprol XL.  - Started Lasix - take 2 pills daily until breathing and swelling improve, then reduce to 1 pill daily, and finally as needed.  - Discontinued Lisinopril - wait 2 days before starting Entresto.  - Discontinued Triamterene HCTZ.  - Stress test.  - Echo.  -  Started Jardiance.    HYPERLIPIDEMIA:  - Continued Crestor.     DVT: Left lower extremity nonocclusive femoral vein DVT.  Her symptoms are bilateral.  Unlikely that they are coming from this DVT.  Reassurance given.  On Eliquis already.  Continue Eliquis.  Heme-Onc determined lifelong anticoagulation    FOLLOW-UP:  - Follow up in 1 week for labs to assess medication tolerance.  - Return to ED if symptoms persist or worsen.  - Bring all current medications to next visit for accurate documentation.         This note was generated with the assistance of ambient listening technology. Verbal consent was obtained by the patient and accompanying visitor(s) for the recording of patient appointment to facilitate this note. I attest to having reviewed and edited the generated note for accuracy, though some syntax or spelling errors may persist. Please contact the author of this note for any clarification.      Thank you very much for involving me in the care of your patient.  Please do not hesitate to contact me if there are any questions.      Candelario Chauhan MD, FAC, [facility]  Interventional Cardiologist, [facility].       Visit today included increased complexity associated with the care of the episodic problem dvt, chronic anticoagulation, ELEVATED BNP AND NEW ONSET CONGESTIVE HEART FAILURE addressed and managing the longitudinal care of the patient due to the serious and/or complex managed problem(s)   Patient Active Problem List   Diagnosis    Nephrolithiasis    Acute post-traumatic headache, not intractable    Chronic pain disorder    Fibromyalgia    Bacteremia due to Escherichia coli    E. coli UTI    Possible COVID-19 Infection    Essential hypertension    Seizure disorder    Asthma    MDD (major depressive disorder)    Ureteral stone    Left ureteral calculus    Leg DVT (deep venous thromboembolism), acute, left    Anticoagulated     .      This note was dictated with the help of speech recognition software.   There might be un-intended errors and/or substitutions.

## 2025-02-07 RX ORDER — ROSUVASTATIN CALCIUM 10 MG/1
10 TABLET, COATED ORAL DAILY
COMMUNITY

## 2025-02-07 RX ORDER — LISINOPRIL 10 MG/1
10 TABLET ORAL DAILY
COMMUNITY
End: 2025-02-21

## 2025-02-07 RX ORDER — PANTOPRAZOLE SODIUM 40 MG/1
40 TABLET, DELAYED RELEASE ORAL DAILY
COMMUNITY

## 2025-02-07 RX ORDER — PROMETHAZINE HYDROCHLORIDE 12.5 MG/1
12.5 TABLET ORAL 4 TIMES DAILY
COMMUNITY

## 2025-02-07 RX ORDER — SERTRALINE HYDROCHLORIDE 50 MG/1
50 TABLET, FILM COATED ORAL DAILY
COMMUNITY

## 2025-02-11 ENCOUNTER — TELEPHONE (OUTPATIENT)
Dept: CARDIOLOGY | Facility: CLINIC | Age: 60
End: 2025-02-11
Payer: COMMERCIAL

## 2025-02-11 NOTE — TELEPHONE ENCOUNTER
----- Message from Candelario Chauhan MD sent at 2/10/2025  4:48 PM CST -----  yes  ----- Message -----  From: Jil Guzmán MA  Sent: 2/7/2025   9:53 AM CST  To: Candelario Chauhan MD    Patient wants to know if she can drink on her heart medication and with congestive heart failure.  ----- Message -----  From: Verona Ornelas MA  Sent: 2/7/2025   8:38 AM CST  To: Giss[facility] Palomino Staff    Type: Patient Call Back    Who called: Self    What is the request in detail: asking for the nurse she was speaking with to give her a call please..     Can the clinic reply by [facility]?NO    Would the patient rather a call back or a response via [facility]? Yes, call     Best call back number: [phone] (home)

## 2025-02-12 ENCOUNTER — LAB VISIT (OUTPATIENT)
Dept: LAB | Facility: HOSPITAL | Age: 60
End: 2025-02-12
Attending: INTERNAL MEDICINE
Payer: COMMERCIAL

## 2025-02-12 DIAGNOSIS — R79.89 ELEVATED BRAIN NATRIURETIC PEPTIDE (BNP) LEVEL: ICD-10-CM

## 2025-02-12 DIAGNOSIS — I50.9 CONGESTIVE HEART FAILURE, UNSPECIFIED HF CHRONICITY, UNSPECIFIED HEART FAILURE TYPE: ICD-10-CM

## 2025-02-12 DIAGNOSIS — R07.9 CHEST PAIN, UNSPECIFIED TYPE: ICD-10-CM

## 2025-02-12 LAB
ANION GAP SERPL CALC-SCNC: 8 MMOL/L (ref 8–16)
BUN SERPL-MCNC: 17 MG/DL (ref 6–20)
CALCIUM SERPL-MCNC: 9.6 MG/DL (ref 8.7–10.5)
CHLORIDE SERPL-SCNC: 112 MMOL/L (ref 95–110)
CO2 SERPL-SCNC: 22 MMOL/L (ref 23–29)
CREAT SERPL-MCNC: 1 MG/DL (ref 0.5–1.4)
EST. GFR  (NO RACE VARIABLE): >60 ML/MIN/1.73 M^2
GLUCOSE SERPL-MCNC: 98 MG/DL (ref 70–110)
POTASSIUM SERPL-SCNC: 4.1 MMOL/L (ref 3.5–5.1)
SODIUM SERPL-SCNC: 142 MMOL/L (ref 136–145)

## 2025-02-12 PROCEDURE — 36415 COLL VENOUS BLD VENIPUNCTURE: CPT | Performed by: INTERNAL MEDICINE

## 2025-02-12 PROCEDURE — 80048 BASIC METABOLIC PNL TOTAL CA: CPT | Performed by: INTERNAL MEDICINE

## 2025-02-20 ENCOUNTER — TELEPHONE (OUTPATIENT)
Dept: HEMATOLOGY/ONCOLOGY | Facility: CLINIC | Age: 60
End: 2025-02-20
Payer: COMMERCIAL

## 2025-02-20 ENCOUNTER — HOSPITAL ENCOUNTER (EMERGENCY)
Facility: HOSPITAL | Age: 60
Discharge: HOME | End: 2025-02-20
Attending: EMERGENCY MEDICINE
Payer: COMMERCIAL

## 2025-02-20 ENCOUNTER — NURSE TRIAGE (OUTPATIENT)
Dept: ADMINISTRATIVE | Facility: CLINIC | Age: 60
End: 2025-02-20
Payer: COMMERCIAL

## 2025-02-20 ENCOUNTER — TELEPHONE (OUTPATIENT)
Dept: CARDIOLOGY | Facility: CLINIC | Age: 60
End: 2025-02-20
Payer: COMMERCIAL

## 2025-02-20 VITALS
WEIGHT: 170 LBS | BODY MASS INDEX: 29.02 KG/M2 | HEIGHT: 64 IN | DIASTOLIC BLOOD PRESSURE: 58 MMHG | SYSTOLIC BLOOD PRESSURE: 102 MMHG | HEART RATE: 67 BPM | OXYGEN SATURATION: 94 % | RESPIRATION RATE: 38 BRPM | TEMPERATURE: 98 F
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
## 2025-02-20 DIAGNOSIS — R07.9 CHEST PAIN: ICD-10-CM

## 2025-02-20 LAB
ALBUMIN SERPL BCP-MCNC: 4.1 G/DL (ref 3.5–5.2)
ALP SERPL-CCNC: 63 U/L (ref 40–150)
ALT SERPL W/O P-5'-P-CCNC: 14 U/L (ref 10–44)
ANION GAP SERPL CALC-SCNC: 10 MMOL/L (ref 8–16)
AST SERPL-CCNC: 14 U/L (ref 10–40)
BASOPHILS # BLD AUTO: 0.02 K/UL (ref 0–0.2)
BASOPHILS NFR BLD: 0.3 % (ref 0–1.9)
BILIRUB SERPL-MCNC: 0.6 MG/DL (ref 0.1–1)
BNP SERPL-MCNC: 36 PG/ML (ref 0–99)
BUN SERPL-MCNC: 20 MG/DL (ref 6–20)
CALCIUM SERPL-MCNC: 9.8 MG/DL (ref 8.7–10.5)
CHLORIDE SERPL-SCNC: 111 MMOL/L (ref 95–110)
CO2 SERPL-SCNC: 21 MMOL/L (ref 23–29)
CREAT SERPL-MCNC: 1.1 MG/DL (ref 0.5–1.4)
DIFFERENTIAL METHOD BLD: NORMAL
EOSINOPHIL # BLD AUTO: 0.2 K/UL (ref 0–0.5)
EOSINOPHIL NFR BLD: 2.2 % (ref 0–8)
ERYTHROCYTE [DISTWIDTH] IN BLOOD BY AUTOMATED COUNT: 12.9 % (ref 11.5–14.5)
EST. GFR  (NO RACE VARIABLE): 58 ML/MIN/1.73 M^2
GLUCOSE SERPL-MCNC: 96 MG/DL (ref 70–110)
HCT VFR BLD AUTO: 43.7 % (ref 37–48.5)
HGB BLD-MCNC: 14.7 G/DL (ref 12–16)
IMM GRANULOCYTES # BLD AUTO: 0.02 K/UL (ref 0–0.04)
IMM GRANULOCYTES NFR BLD AUTO: 0.3 % (ref 0–0.5)
LYMPHOCYTES # BLD AUTO: 2.6 K/UL (ref 1–4.8)
LYMPHOCYTES NFR BLD: 36.1 % (ref 18–48)
MCH RBC QN AUTO: 30 PG (ref 27–31)
MCHC RBC AUTO-ENTMCNC: 33.6 G/DL (ref 32–36)
MCV RBC AUTO: 89 FL (ref 82–98)
MONOCYTES # BLD AUTO: 0.4 K/UL (ref 0.3–1)
MONOCYTES NFR BLD: 5 % (ref 4–15)
NEUTROPHILS # BLD AUTO: 4.1 K/UL (ref 1.8–7.7)
NEUTROPHILS NFR BLD: 56.1 % (ref 38–73)
NRBC BLD-RTO: 0 /100 WBC
PLATELET # BLD AUTO: 266 K/UL (ref 150–450)
PMV BLD AUTO: 10.7 FL (ref 9.2–12.9)
POTASSIUM SERPL-SCNC: 3.8 MMOL/L (ref 3.5–5.1)
PROT SERPL-MCNC: 7.5 G/DL (ref 6–8.4)
RBC # BLD AUTO: 4.9 M/UL (ref 4–5.4)
SODIUM SERPL-SCNC: 142 MMOL/L (ref 136–145)
TROPONIN I SERPL DL<=0.01 NG/ML-MCNC: <0.006 NG/ML (ref 0–0.03)
WBC # BLD AUTO: 7.21 K/UL (ref 3.9–12.7)

## 2025-02-20 PROCEDURE — 83880 ASSAY OF NATRIURETIC PEPTIDE: CPT

## 2025-02-20 PROCEDURE — 80053 COMPREHEN METABOLIC PANEL: CPT

## 2025-02-20 PROCEDURE — 85025 COMPLETE CBC W/AUTO DIFF WBC: CPT

## 2025-02-20 PROCEDURE — 84484 ASSAY OF TROPONIN QUANT: CPT

## 2025-02-20 PROCEDURE — 93010 ELECTROCARDIOGRAM REPORT: CPT | Mod: ,,, | Performed by: INTERNAL MEDICINE

## 2025-02-20 PROCEDURE — 93005 ELECTROCARDIOGRAM TRACING: CPT

## 2025-02-20 PROCEDURE — 99285 EMERGENCY DEPT VISIT HI MDM: CPT | Mod: 25

## 2025-02-20 NOTE — TELEPHONE ENCOUNTER
Tc rec'd from pt stating she woke up with her ear bleeding and tingling in her fingers  Advised her to see PC, ENT,urgent care or ER  She stated she was jsut placed on a new medication by her cardiologist and was concerned that may be causing the problem  Advised her to contact Dr Chauhan re: that question She acknsoledged understanding

## 2025-02-20 NOTE — TELEPHONE ENCOUNTER
Spoke with patient and she states yesterday she woke up with blood on her pillow and then again this morning. I advised patient to go to er or urgent care. Patient verbalized understanding. mai

## 2025-02-20 NOTE — ED PROVIDER NOTES
"Encounter Date: 2025       History     Chief Complaint   Patient presents with    Chest Pain     Intermittent "dull" Upper bilateral chest  pain since earlier today. Reports has had recent dry cough. Reports having SOB, Hx CHF. No tx used.     Fatigue     States has been feeling fatigued and weak since started new medications on  (lasix, metoprolol, entresto, spironolactone)    Otalgia     States noticed bleeding on pillow yesterday and had dry blood to LEFT ear. Taking eliquis. Denies recent fall or head injury. Denies ear trauma. States hearing is muffled to LEFT side.     Fall     Pt noted to fall from sitting position on chair to floor, landing on knees.Having RIGHT hip and LEFT knee pain. Assisted to w/c.      59 year old female presenting with multiple complaints including fatigue, left ear bloody discharge, vague chest pain, tingling of her right fingertips.  She reports she was started on multiple medications recently but does not know what they are all 4.  She feels this has been part of the problem.  She denies chest pain, shortness of breath, nausea, vomiting.  Notes mild swelling of her extremities though no pitting edema.  Notes normal urine output.  She reports she was told to stopped drinking water and to only take small sips.      Review of patient's allergies indicates:   Allergen Reactions    Penicillins Itching     Past Medical History:   Diagnosis Date    Asthma     Brain mass     Depression     Fibromyalgia     Hypertension     Seizures     fell and hit head at grocery        Past Surgical History:   Procedure Laterality Date    CARPAL TUNNEL RELEASE      right     SECTION      x 3    cysto/ureteroscopy stent placement      CYSTOSCOPY W/ URETERAL STENT PLACEMENT Left 2020    Procedure: CYSTOSCOPY, WITH URETERAL STENT INSERTION;  Surgeon: Yessi Granados MD;  Location: [facility];  Service: Urology;  Laterality: Left;    HYSTERECTOMY      RETROGRADE PYELOGRAPHY N/A " 7/20/2020    Procedure: PYELOGRAM, RETROGRADE;  Surgeon: Yessi Granados MD;  Location: [facility] OR;  Service: Urology;  Laterality: N/A;    TONSILLECTOMY      URETEROSCOPIC REMOVAL OF URETERIC CALCULUS Left 7/31/2020    Procedure: Cystoscopy, possible retrograde pyelogram, ureteroscopy with laser lithotripsy, placement of ureteral stent;  Surgeon: Yessi Granados MD;  Location: [facility] OR;  Service: Urology;  Laterality: Left;  RN PREOP 7/29/2020---COVID NEGATIVE ON 7/29     Family History   Problem Relation Name Age of Onset    Cancer Mother          Breast cancer    Heart disease Mother      Diabetes Father      Cancer Maternal Grandmother          Lung Cancer    Cancer Maternal Grandfather          Lung Cancer    Cancer Paternal Grandmother      Cancer Paternal Grandfather       Social History[1]  Review of Systems   Constitutional:  Negative for fever.   Respiratory:  Negative for shortness of breath.    Cardiovascular:  Negative for chest pain.   Gastrointestinal:  Negative for nausea.   Genitourinary:  Negative for dysuria.   Musculoskeletal:  Negative for back pain.   Skin:  Negative for rash.   Neurological:  Negative for weakness.       Physical Exam     Initial Vitals [02/20/25 1406]   BP Pulse Resp Temp SpO2   124/65 (!) 59 17 97.9 °F (36.6 °C) 99 %      MAP       --         Physical Exam    Nursing note and vitals reviewed.  Constitutional: She appears well-developed and well-nourished. She is not diaphoretic. No distress.   HENT:   Head: Normocephalic and atraumatic.   Nose: Nose normal.   Eyes: EOM are normal. Pupils are equal, round, and reactive to light. No scleral icterus.   Neck: Neck supple.   Normal range of motion.  Cardiovascular:  Normal rate, regular rhythm, normal heart sounds and intact distal pulses.     Exam reveals no gallop and no friction rub.       No murmur heard.  Pulmonary/Chest: Breath sounds normal. No stridor. No respiratory distress. She has no wheezes. She has no  rhonchi. She has no rales.   Abdominal: Abdomen is soft. Bowel sounds are normal. She exhibits no distension. There is no abdominal tenderness. There is no rebound and no guarding.   Musculoskeletal:         General: No tenderness or edema. Normal range of motion.      Cervical back: Normal range of motion and neck supple.     Neurological: She is alert and oriented to person, place, and time. No cranial nerve deficit. GCS score is 15. GCS eye subscore is 4. GCS verbal subscore is 5. GCS motor subscore is 6.   Skin: Skin is warm and dry. No rash noted.   Psychiatric: She has a normal mood and affect. Her behavior is normal.         ED Course   Procedures  Labs Reviewed   COMPREHENSIVE METABOLIC PANEL - Abnormal       Result Value    Sodium 142      Potassium 3.8      Chloride 111 (*)     CO2 21 (*)     Glucose 96      BUN 20      Creatinine 1.1      Calcium 9.8      Total Protein 7.5      Albumin 4.1      Total Bilirubin 0.6      Alkaline Phosphatase 63      AST 14      ALT 14      eGFR 58 (*)     Anion Gap 10     CBC W/ AUTO DIFFERENTIAL    WBC 7.21      RBC 4.90      Hemoglobin 14.7      Hematocrit 43.7      MCV 89      MCH 30.0      MCHC 33.6      RDW 12.9      Platelets 266      MPV 10.7      Immature Granulocytes 0.3      Gran # (ANC) 4.1      Immature Grans (Abs) 0.02      Lymph # 2.6      Mono # 0.4      Eos # 0.2      Baso # 0.02      nRBC 0      Gran % 56.1      Lymph % 36.1      Mono % 5.0      Eosinophil % 2.2      Basophil % 0.3      Differential Method Automated     B-TYPE NATRIURETIC PEPTIDE    BNP 36     TROPONIN I    Troponin I <0.006       EKG Readings: (Independently Interpreted)   Initial Reading: No STEMI. Rhythm: Normal Sinus Rhythm. Heart Rate: 60. Ectopy: No Ectopy.   No ST segment elevation or depression concerning for acute ischemia.          Imaging Results              X-Ray Chest AP Portable (Final result)  Result time 02/20/25 15:42:38      Final result by Jerome Madsen MD (02/20/25  15:42:38)                   Impression:      Allowing for somewhat poor inspiration, no acute cardiopulmonary disease and no significant interval change      Electronically signed by: Jerome Madsen MD  Date:    02/20/2025  Time:    15:42               Narrative:    EXAMINATION:  XR CHEST AP PORTABLE    CLINICAL HISTORY:  Chest Pain;    TECHNIQUE:  Single frontal view of the chest was performed.    COMPARISON:  02/01/2024    FINDINGS:  Somewhat poor inspiration and portable AP technique accentuate the cardiac silhouette.  Allowing for these factors, the heart appears unchanged in size and configuration.  Pulmonary vascular pattern does not appear congested.  Overall volume loss of both lungs.  No definite airspace consolidation or pleural effusion.  No pneumothorax.  Skeletal structures appear intact.                                       Medications - No data to display  Medical Decision Making                        Medical Decision Making:   Initial Assessment:   59-year-old female presenting with multiple complaints.  The complaints are mostly vague, ongoing chronically.  She seems frustrated with the amount of pill burden that she does not understand what they are for.  Recently was seen in the emergency department for leg swelling.  Leg swelling has been somewhat chronic.  A BNP was slightly elevated.  She then saw cardiology who she reports put her on multiple medications which seem to include Lasix, spironolactone, metoprolol, and possibly others.  Reviewed notes.  Patient states she was also told to stop drinking fluids and to only have very small sips of water.  She has no clinical outward signs of heart failure.  She does note intermittent dizziness with standing up.  At bedside she appears to have some degree of orthostatic hypotension or symptoms.  BNP today is normal.  Her lungs are clear.  I reviewed her prior charts.  She does not have an echo since 2021 and at that time it appeared to be normal.  She  has no signs of heart failure currently.  She has no abnormal cardiac function recorded in the past.  I discussed the need for her to follow-up with her primary care provider and cardiologist.  An echocardiogram is pending in the next week.  I had an in-depth discussion with her regarding my recommendation to discuss with her primary care doc her pill burden in if it can be reduced.  If she does not have any further sign of cardiac dysfunction, a reduction in her diuretics may be beneficial.  She has not hypertensive here.  She is on metoprolol, this may also be unnecessary depending on further cardiac workup?  I requested her to discuss further with her cardiologist and primary care provider.             Clinical Impression:  Final diagnoses:  [R07.9] Chest pain  [W19.XXXA] Fall, initial encounter (Primary)          ED Disposition Condition    Discharge Stable          ED Prescriptions    None       Follow-up Information       Follow up With Specialties Details Why Contact Info    Ky Mann MD General Practice, Internal Medicine Schedule an appointment as soon as possible for a visit   [address]  [address]  [facility] [address] [phone]      [facility] [place] - Emergency Dept Emergency Medicine  As needed, If symptoms worsen [address] [facility] [place] [address] [phone]                 [1]   Social History  Tobacco Use    Smoking status: Never    Smokeless tobacco: Never   Substance Use Topics    Alcohol use: Yes     Comment: occasionally    Drug use: No        Francisco Javier Cameron MD  02/21/25 [id number]

## 2025-02-20 NOTE — TELEPHONE ENCOUNTER
----- Message from Nohelia sent at 2/20/2025  9:08 AM CST -----  Type:  Needs Medical AdviceWho Called: PtSymptoms (please be specific): ear bleeding (left)Would the patient rather a call back or a response via [facility]? CallBest Call Back Number:  [phone] Information:  Pt states she would like to speak to provider regarding symptoms.  Pt states she is not sure if it is related to medications recently prescribed.  Pt was transferred to NOC per symptom screening (Yellow, talk to provider or nurse within 15 minutes)

## 2025-02-20 NOTE — ED TRIAGE NOTES
"Pt is a 59 y.o. female presenting to the Ed for evaluation of anterior right sided chest pain described as "dull" since today. Pt also reporting bleeding from the left ear canal and denies pain. Pt denies any SOB, n/v/d, or urinary symptoms at this time. Pt was recently started on new medications (lasix, metoprolo, entresto, and spironolactone) 02/26/2025. While in triage pt states that "she became weak and dizzy and fell from chair to the floor". Pt denies any pain at this time. Reported pmhx of CHF, DVT on eliquis, and HTN.   "

## 2025-02-21 ENCOUNTER — OFFICE VISIT (OUTPATIENT)
Dept: CARDIOLOGY | Facility: CLINIC | Age: 60
End: 2025-02-21
Payer: COMMERCIAL

## 2025-02-21 ENCOUNTER — TELEPHONE (OUTPATIENT)
Dept: CARDIOLOGY | Facility: CLINIC | Age: 60
End: 2025-02-21
Payer: COMMERCIAL

## 2025-02-21 VITALS
WEIGHT: 171.88 LBS | SYSTOLIC BLOOD PRESSURE: 122 MMHG | RESPIRATION RATE: 15 BRPM | HEIGHT: 64 IN | DIASTOLIC BLOOD PRESSURE: 64 MMHG | OXYGEN SATURATION: 95 % | HEART RATE: 64 BPM | BODY MASS INDEX: 29.34 KG/M2

## 2025-02-21 DIAGNOSIS — R07.9 CHEST PAIN, UNSPECIFIED TYPE: ICD-10-CM

## 2025-02-21 DIAGNOSIS — H92.20 BLEEDING FROM EAR, UNSPECIFIED LATERALITY: ICD-10-CM

## 2025-02-21 DIAGNOSIS — I50.9 CONGESTIVE HEART FAILURE, UNSPECIFIED HF CHRONICITY, UNSPECIFIED HEART FAILURE TYPE: ICD-10-CM

## 2025-02-21 DIAGNOSIS — Z79.01 ANTICOAGULATED: ICD-10-CM

## 2025-02-21 DIAGNOSIS — R79.89 ELEVATED BRAIN NATRIURETIC PEPTIDE (BNP) LEVEL: Primary | ICD-10-CM

## 2025-02-21 DIAGNOSIS — I82.4Y9 DEEP VEIN THROMBOSIS (DVT) OF PROXIMAL LOWER EXTREMITY, UNSPECIFIED CHRONICITY, UNSPECIFIED LATERALITY: ICD-10-CM

## 2025-02-21 RX ORDER — SPIRONOLACTONE 25 MG/1
12.5 TABLET ORAL DAILY
Qty: 45 TABLET | Refills: 3 | Status: SHIPPED | OUTPATIENT
Start: 2025-02-21

## 2025-02-21 NOTE — TELEPHONE ENCOUNTER
----- Message from Lyn sent at 2/21/2025  2:18 PM CST -----  Regarding: self  Type: Patient Call Back Who called:self What is the request in detail:pt has an appt today with Dr Chauhan about medication and is stating she is cold and does not want to get out in the weather and is asking if the medication can be called in toPt is also asking if its ok to use tylenol and how often  [id number] - [place], [place] - [id number] [place] [address]Lqdbf: [phone] Fax: [phone] Can the clinic reply by [facility]? No Would the patient rather a call back or a response via [facility]? Call back Best call back number: [phone] Additional Information: Thank you.

## 2025-02-21 NOTE — PROGRESS NOTES
CARDIOVASCULAR CONSULTATION    REASON FOR CONSULT:   Myra Harrison is a 59 y.o. female who presents for   Chief Complaint   Patient presents with    Follow-up     HISTORY OF PRESENT ILLNESS:     Patient is a pleasant 58-year-old lady.  Had knee surgery done last year.  After that DVT of left femoral vein.  Has had multiple ultrasounds done since then which have revealed partially occlusive DVT of left femoral vein, including 1 done recently at [facility].  She has musculoskeletal myalgias and bilateral leg pains which occur from the thighs all the way down to the feet.  No particular aggravating or relieving factors.  She has brought a she would from her primary care physician requesting a CTA angiogram of the legs to check for circulation.  She did have CT angiogram of her lungs performed in November which did not reveal any pulmonary embolism.  Denies orthopnea PND.  Does have chronic cough.      Impression:     1. No evidence of PE.  2. No acute intrathoracic abnormalities identified.  3. Stable 1.2 cm right lower lobe pulmonary nodule, requiring no follow-up as appears stable compared to remote CT from 2015 and likely reflecting benign etiology.    Impression:     Suspected ongoing nonocclusive thrombus in the left femoral vein.  No DVT in the right lower extremity.       Impression:     Unchanged appearance of nonocclusive thrombus within the proximal to mid aspect of the left femoral vein.        CONCLUSIONS     Normal left ventricular systolic function.     Calculated left ventricular ejection fraction by 2D tracking is 60 %.     Normal diastolic function.     There were no regional wall motion abnormalities.     Indeterminate right ventricular systolic function.       - TAPSE 12.4mm, S' 10.3cm/s.       - Normal Right ventricular systolic pressure 26.2 mmHg.     Normal atrial dimensions.     Normal cardiac valve structures.     Normal doppler study - trace MR, TR, LA only.    Findings:     Right  CFV: Patent   Right GSV: Patent   Right PFV: Patent   Right SFV Prox: Patent   Right SFV Mid: Patent   Right SFV Distal: Patent   Right Popliteal: Patent   Right Peroneal: Patent   Right PTV: Patent     Left CFV: Patent   Left GSV: Patent   Left PFV: Patent   Left SFV Prox: Partially Occluded   Left SFV Mid: Patent   Left SFV Distal: Patent   Left Popliteal: Patent   Left Peroneal: Patent   Left PTV: Patent      Notes from April 2024: Patient here for follow-up.  Denies any chest pains at rest on exertion, orthopnea, PND.  Has been doing fine.  Main complaint is chronic dyspnea on exertion which has been going on for many years      02/25/2021 Dobutamine Stress Test  Findings:   The left ventricle is normal in size.   Post stress SPECT images demonstrate normal perfusion in all regions. The rest images reveal no reversibility. Gated SPECT imaging demonstrates normal wall motion in all regions with a left ventricular ejection fraction estimated to be 68.00%.      Impression:  Stress ECG portion was negative for ischemia.   The overall study quality is good.   Normal perfusion scan with no evidence of inducible ischemia or areas of infarct      There is no prior study for comparison    November 21st 2024    History of Present Illness    CHIEF COMPLAINT:  Myra presents today for leg pain.    LEG PAIN:  She reports constant pain in both legs. The exact location within the legs and specific cause are not identified.    CHEST DISCOMFORT:  She experiences occasional chest pain, including a cramp during the EKG procedure. She denies constant chest pressure or pain related to specific activities such as walking or lying down.    CIRCULATION:  Recent venous test revealed a chronic clot in the superficial veins, which may be causing inflammation and pain. No other circulatory concerns were noted.    MEDICATIONS:  She continues adherence to prescribed blood thinners.    WEIGHT:  She reports being overweight.         Recent  ultrasound done in November 20, 2024 demonstrated no evidence of right lower extremity DVT, left common femoral vein was patent and left superficial femoral proximal vein at partial compressibility with chronic thrombus as well as left superficial femoral middle vein had partial compressible lip 80 with chronic thrombus    Arterial ultrasound showed no significant flow-limiting stenosis    Feb 25:    History of Present Illness    CHIEF COMPLAINT:  Myra presents today for fatigue, leg pain, and back pain.    CURRENT SYMPTOMS:  She reports fatigue with generalized weakness. She experiences pain in both legs and back. She reports chest pain occurring multiple times daily, varying in location from center to left side of chest, upper chest, and back with radiation under the arm. She experiences shortness of breath. She has a persistent cough despite taking allergy medications and Albuterol.    RECENT ER VISIT:  She reports an ER visit on Monday where BNP was elevated. She received oral medication and IV placement without medication administration.    FLUID RETENTION:  She reports fluid retention despite being on Triamterene, noting she is unable to wear her wedding ring.    MEDICAL HISTORY:  She has a history of blood clots  with confirmed involvement of the left leg.    CURRENT MEDICATIONS:  She takes eliquis 5 mg twice daily, triamterene hydrochlorothiazide, Albuterol, allergy medications, and Crestor. She reports taking Lisinopril, though dosage is unclear and medication is not on official medication list.         February 21, 2025    History of Present Illness    CHIEF COMPLAINT:  Myra presents today for follow up after an ER visit due to passing out while sitting in a chair    HISTORY OF PRESENT ILLNESS:  She experienced a syncopal episode while sitting in a chair at the ER, losing consciousness with no memory of the event. She denies any prior history of passing out. She reports intermittent dizziness upon  standing, leg pain, numbness and tingling in fingertips, and cold feet with finger swelling. She noted blood on her pillow from ear bleeding yesterday morning.  On her last visit she was initiated on spironolactone and Entresto and feels much better.  Her swelling has gone away as well as her shortness of breath has improved significantly.  Her BNP has also normalized on this therapy    MEDICAL HISTORY:  She has history of congestive heart failure and DVT requiring lifelong anticoagulation therapy.    FAMILY HISTORY:  Father has diabetes.    CURRENT MEDICATIONS:  She takes metoprolol, rosuvastatin, Entresto, spironolactone, Eliquis, and Lasix as needed.           PAST MEDICAL HISTORY:     Past Medical History:   Diagnosis Date    Asthma     Brain mass     Depression     Fibromyalgia     Hypertension     Seizures 2007    fell and hit head at grocery          PAST SURGICAL HISTORY:     Past Surgical History:   Procedure Laterality Date    CARPAL TUNNEL RELEASE      right     SECTION      x 3    cysto/ureteroscopy stent placement      CYSTOSCOPY W/ URETERAL STENT PLACEMENT Left 2020    Procedure: CYSTOSCOPY, WITH URETERAL STENT INSERTION;  Surgeon: Yessi Granados MD;  Location: [facility] OR;  Service: Urology;  Laterality: Left;    HYSTERECTOMY      RETROGRADE PYELOGRAPHY N/A 2020    Procedure: PYELOGRAM, RETROGRADE;  Surgeon: Yessi Granados MD;  Location: [facility] OR;  Service: Urology;  Laterality: N/A;    TONSILLECTOMY      URETEROSCOPIC REMOVAL OF URETERIC CALCULUS Left 2020    Procedure: Cystoscopy, possible retrograde pyelogram, ureteroscopy with laser lithotripsy, placement of ureteral stent;  Surgeon: Yessi Granados MD;  Location: [facility] OR;  Service: Urology;  Laterality: Left;  RN PREOP 2020---COVID NEGATIVE ON            SOCIAL HISTORY:     Social History     Socioeconomic History    Marital status:    Tobacco Use    Smoking status: Never    Smokeless  "tobacco: Never   Substance and Sexual Activity    Alcohol use: Yes     Comment: occasionally    Drug use: No    Sexual activity: Yes       FAMILY HISTORY:     Family History   Problem Relation Name Age of Onset    Cancer Mother          Breast cancer    Heart disease Mother      Diabetes Father      Cancer Maternal Grandmother          Lung Cancer    Cancer Maternal Grandfather          Lung Cancer    Cancer Paternal Grandmother      Cancer Paternal Grandfather         REVIEW OF SYSTEMS:   Review of Systems   Constitutional: Negative.   HENT: Negative.     Eyes: Negative.    Endocrine: Negative.    Hematologic/Lymphatic: Negative.    Skin: Negative.    Musculoskeletal:  Positive for joint pain and myalgias.   Gastrointestinal: Negative.    Genitourinary: Negative.    Neurological: Negative.    Psychiatric/Behavioral: Negative.     Allergic/Immunologic: Negative.        A 10 point review of systems was performed and all the pertinent positives have been mentioned. Rest of review of systems was negative.        PHYSICAL EXAM:     Vitals:    02/21/25 1540   BP: 122/64   Pulse: 64   Resp: 15    Body mass index is 29.5 kg/m².  Weight: 78 kg (171 lb 13.6 oz)   Height: 5' 4" (162.6 cm)     Physical Exam  Constitutional:       Appearance: Normal appearance. She is well-developed.   HENT:      Head: Normocephalic.   Eyes:      Pupils: Pupils are equal, round, and reactive to light.   Cardiovascular:      Rate and Rhythm: Normal rate and regular rhythm.   Pulmonary:      Effort: Pulmonary effort is normal.      Breath sounds: Normal breath sounds.   Abdominal:      General: Bowel sounds are normal.      Palpations: Abdomen is soft.      Tenderness: There is no abdominal tenderness.   Musculoskeletal:         General: Normal range of motion.      Cervical back: Normal range of motion and neck supple.   Skin:     General: Skin is warm.   Neurological:      Mental Status: She is alert and oriented to person, place, and time. " "          DATA:     Laboratory:  CBC:  Recent Labs   Lab 05/01/24  1158 02/03/25  1622 02/20/25  1448   WBC 7.97 8.27 7.21   Hemoglobin 14.8 14.2 14.7   Hematocrit 44.1 44.0 43.7   Platelets 307 252 266       CHEMISTRIES:  Recent Labs   Lab 02/03/25  1622 02/12/25  0957 02/20/25  1448   Glucose 88 98 96   Sodium 139 142 142   Potassium 4.0 4.1 3.8   BUN 15 17 20   Creatinine 0.8 1.0 1.1   Calcium 9.5 9.6 9.8       CARDIAC BIOMARKERS:  Recent Labs   Lab 11/15/23  1657 12/29/23  1128 02/01/24  1244 02/03/25  1623 02/20/25  1448   CPK  --   --  47 43  --    Troponin I <0.006 <0.006  --   --  <0.006       COAGS:  Recent Labs   Lab 02/01/24  1244 02/03/25  1622   INR 1.0 1.0       LIPIDS/LFTS:  Recent Labs   Lab 02/01/24  1244 02/03/25  1622 02/20/25  1448   AST 21 13 14   ALT 19 11 14       No results found for: "HGBA1C"    TSH        The ASCVD Risk score (Camryn DK, et al., 2019) failed to calculate for the following reasons:    Cannot find a previous HDL lab    Cannot find a previous total cholesterol lab       BNP    Lab Results   Component Value Date/Time    BNP 36 02/20/2025 02:48 PM     (H) 02/03/2025 04:22 PM    BNP 19 12/29/2023 11:28 AM    BNP 19 11/15/2023 04:57 PM            ECHO    Results for orders placed during the hospital encounter of 07/19/20    Echo Color Flow Doppler? Yes    Interpretation Summary  · Normal left ventricular systolic function. The estimated ejection fraction is 55%.  · Normal LV diastolic function.  · Normal right ventricular systolic function.  · No pulmonary hypertension present.      STRESS TEST    No results found for this or any previous visit.        CATH    No results found for this or any previous visit.              ASSESSMENT AND PLAN     Patient Active Problem List   Diagnosis    Nephrolithiasis    Acute post-traumatic headache, not intractable    Chronic pain disorder    Fibromyalgia    Bacteremia due to Escherichia coli    E. coli UTI    Possible COVID-19 Infection "    Essential hypertension    Seizure disorder    Asthma    MDD (major depressive disorder)    Ureteral stone    Left ureteral calculus    Leg DVT (deep venous thromboembolism), acute, left    Anticoagulated    Elevated brain natriuretic peptide (BNP) level    Congestive heart failure         ALLERGIES AND MEDICATION:     Review of patient's allergies indicates:   Allergen Reactions    Penicillins Itching        Medication List            Accurate as of February 21, 2025  4:58 PM. If you have any questions, ask your nurse or doctor.                CHANGE how you take these medications      spironolactone 25 MG tablet  Commonly known as: ALDACTONE  Take 0.5 tablets (12.5 mg total) by mouth once daily.  What changed: how much to take  Changed by: Candelario Chauhan MD            CONTINUE taking these medications      albuterol sulfate 2.5 mg/0.5 mL Nebu  Take 2.5 mg by nebulization every 4 (four) hours as needed (wheeze). Rescue     ELIQUIS 5 mg Tab  Generic drug: apixaban  Take 1 tablet (5 mg total) by mouth 2 (two) times daily.     ergocalciferol 50,000 unit Cap  Commonly known as: ERGOCALCIFEROL     fluticasone-salmeterol 250-50 mcg/dose 250-50 mcg/dose diskus inhaler  Commonly known as: ADVAIR     furosemide 20 MG tablet  Commonly known as: LASIX  Take 1 tablet (20 mg total) by mouth as needed (1-2 pills as needed for fluid overload).     gabapentin 300 MG capsule  Commonly known as: NEURONTIN     LIDOcaine 5 %  Commonly known as: LIDODERM  Place 1 patch onto the skin once daily. Apply patch for 12 hours and then leave off for 12 hours     metoprolol succinate 25 MG 24 hr tablet  Commonly known as: TOPROL-XL  Take 1 tablet (25 mg total) by mouth once daily.     ondansetron 4 MG tablet  Commonly known as: ZOFRAN  Take 1 tablet (4 mg total) by mouth every 8 (eight) hours as needed.     * ondansetron 4 MG Tbdl  Commonly known as: ZOFRAN-ODT  Take 1 tablet (4 mg total) by mouth every 6 (six) hours as needed (Nausea).     *  ondansetron 4 MG Tbdl  Commonly known as: ZOFRAN-ODT  Take 1 tablet (4 mg total) by mouth every 6 (six) hours as needed.     oxybutynin 5 MG Tab  Commonly known as: DITROPAN  Take 1 tablet (5 mg total) by mouth 3 (three) times daily as needed (bladder spasms).     pantoprazole 40 MG tablet  Commonly known as: PROTONIX     promethazine 12.5 MG Tab  Commonly known as: PHENERGAN     rosuvastatin 10 MG tablet  Commonly known as: CRESTOR     sacubitriL-valsartan 24-26 mg per tablet  Commonly known as: ENTRESTO  Take 1 tablet by mouth 2 (two) times daily.     sertraline 50 MG tablet  Commonly known as: ZOLOFT     UNABLE TO FIND           * This list has 2 medication(s) that are the same as other medications prescribed for you. Read the directions carefully, and ask your doctor or other care provider to review them with you.                   Where to Get Your Medications        These medications were sent to [id number] - [place] - [address]  [address] [address]      Phone: [phone]   spironolactone 25 MG tablet         Orders Placed This Encounter   Procedures    HEMOGLOBIN A1C    Ambulatory referral/consult to ENT       Assessment & Plan    IMPRESSION:  BNP marker normalized, indicating improvement with current treatment regimen  Assessed medication efficacy and side effects, focusing on fluid balance and blood pressure management  Adjusted medications to address potential orthostatic hypotension as cause for dizziness  Evaluated reported ear bleeding in context of fall and use of Eliquis    PLAN SUMMARY:  - Orthostatic blood pressure check ordered.  Patient not orthostatic in clinic  - Hemoglobin A1c test ordered  - Referred to ENT for evaluation of ear bleeding  - Continued Lasix, Rosuvastatin, Entresto, Eliquis, and Metoprolol  - Decreased spironolactone dose to 12.5 mg daily  - Follow up after stress test and echo  - Follow up on March 19th as scheduled    HEART  FAILURE with preserved ejection fraction:  - Explained significance of BNP levels in diagnosing and monitoring heart failure.  - Discussed role of various medications in managing heart failure symptoms.  - Myra to monitor weight daily, noting increases of more than 3 lbs.  - Recommend maintaining regular water intake without overhydrating.  - Decreased spironolactone: Current dose reduced by half.  - Continued Lasix: Use only as needed when weight increases by more than 3 lbs.  - Continued Entresto.  - Continued Metoprolol.  - Continued Rosuvastatin.  - patient's symptoms and leg swelling have improved significantly since she was initiated on spironolactone and Entresto    DIZZINESS AND SYNCOPE:  - Myra to be aware of dizziness upon standing and report if it persists after medication adjustment.  - Orthostatic blood pressure check ordered.  Patient not orthostatic in clinic  - Contact the office if still experiencing dizziness after 5 days of reduced spironolactone dose for further medication adjustments.  Monitor blood pressure at home.    ANTICOAGULATION MANAGEMENT for DVT:  - Continued Eliquis.    DIABETES SCREENING:  - Hemoglobin A1c test ordered.    EAR BLEEDING:  - Referred to ENT for evaluation of ear bleeding.    FOLLOW-UP:  - Follow up on March 19th as scheduled.  - Follow up after stress test and echo.         HYPERLIPIDEMIA:  - Continued Crestor.     DVT: Left lower extremity nonocclusive femoral vein DVT.    On Eliquis already.  Continue Eliquis.  Heme-Onc determined lifelong anticoagulation        This note was generated with the assistance of ambient listening technology. Verbal consent was obtained by the patient and accompanying visitor(s) for the recording of patient appointment to facilitate this note. I attest to having reviewed and edited the generated note for accuracy, though some syntax or spelling errors may persist. Please contact the author of this note for any  clarification.      Thank you very much for involving me in the care of your patient.  Please do not hesitate to contact me if there are any questions.      Candelario Chauhan MD, FACC, [facility]  Interventional Cardiologist, [facility].       Visit today included increased complexity associated with the care of the episodic problem dvt, chronic anticoagulation, ELEVATED BNP AND NEW ONSET CONGESTIVE HEART FAILURE addressed and managing the longitudinal care of the patient due to the serious and/or complex managed problem(s)   Patient Active Problem List   Diagnosis    Nephrolithiasis    Acute post-traumatic headache, not intractable    Chronic pain disorder    Fibromyalgia    Bacteremia due to Escherichia coli    E. coli UTI    Possible COVID-19 Infection    Essential hypertension    Seizure disorder    Asthma    MDD (major depressive disorder)    Ureteral stone    Left ureteral calculus    Leg DVT (deep venous thromboembolism), acute, left    Anticoagulated    Elevated brain natriuretic peptide (BNP) level    Congestive heart failure     .      This note was dictated with the help of speech recognition software.  There might be un-intended errors and/or substitutions.

## 2025-02-21 NOTE — TELEPHONE ENCOUNTER
----- Message from Lyn sent at 2/21/2025 10:15 AM CST -----  Regarding: self  Type: Patient Call Back Who called:self What is the request in detail:pt is stating she wants to talk to Dr Chauhan himself today, she is stating she receive a call back from the nurse yesterday and she wants to only speak to Dr Chauhan.  Can the clinic reply by [facility]? No Would the patient rather a call back or a response via [facility]? Call back Best call back number: [phone] Additional Information: Thank you.

## 2025-02-21 NOTE — TELEPHONE ENCOUNTER
"Spoke with patient. She states she proceeded to er after having ear bleeding for 2 days per instructions from our office. When she went to the er, she fell out the chair as her vitals were being taken. She was seen by a provider in the er and was told that "they do not know why she went to the er when it is her doctors job to handle her care," she states she was also told, "I'm unsure why dr. Chauhan put you on these medications because you dont need them. If I were your doctor, I wouldn't have you on them." Patient was very upset and states she left the emergency room and wasn't given any medications. Patient scheduled for appointment with Dr. Chauhan for today to dicuss concerns.   "

## 2025-02-22 LAB
OHS QRS DURATION: 96 MS
OHS QTC CALCULATION: 440 MS

## 2025-02-25 ENCOUNTER — TELEPHONE (OUTPATIENT)
Dept: OTOLARYNGOLOGY | Facility: CLINIC | Age: 60
End: 2025-02-25

## 2025-02-25 ENCOUNTER — OFFICE VISIT (OUTPATIENT)
Dept: OTOLARYNGOLOGY | Facility: CLINIC | Age: 60
End: 2025-02-25
Payer: COMMERCIAL

## 2025-02-25 VITALS
WEIGHT: 171.94 LBS | HEIGHT: 64 IN | DIASTOLIC BLOOD PRESSURE: 74 MMHG | SYSTOLIC BLOOD PRESSURE: 114 MMHG | BODY MASS INDEX: 29.35 KG/M2

## 2025-02-25 DIAGNOSIS — H92.20 BLEEDING FROM EAR, UNSPECIFIED LATERALITY: ICD-10-CM

## 2025-02-25 PROCEDURE — 3074F SYST BP LT 130 MM HG: CPT | Mod: CPTII,S$GLB,, | Performed by: NURSE PRACTITIONER

## 2025-02-25 PROCEDURE — 3008F BODY MASS INDEX DOCD: CPT | Mod: CPTII,S$GLB,, | Performed by: NURSE PRACTITIONER

## 2025-02-25 PROCEDURE — 3078F DIAST BP <80 MM HG: CPT | Mod: CPTII,S$GLB,, | Performed by: NURSE PRACTITIONER

## 2025-02-25 PROCEDURE — 99203 OFFICE O/P NEW LOW 30 MIN: CPT | Mod: S$GLB,,, | Performed by: NURSE PRACTITIONER

## 2025-02-25 PROCEDURE — 4010F ACE/ARB THERAPY RXD/TAKEN: CPT | Mod: CPTII,S$GLB,, | Performed by: NURSE PRACTITIONER

## 2025-02-25 PROCEDURE — 1159F MED LIST DOCD IN RCRD: CPT | Mod: CPTII,S$GLB,, | Performed by: NURSE PRACTITIONER

## 2025-02-25 RX ORDER — NEOMYCIN SULFATE, POLYMYXIN B SULFATE AND HYDROCORTISONE 10; 3.5; 1 MG/ML; MG/ML; [USP'U]/ML
3 SUSPENSION/ DROPS AURICULAR (OTIC) 3 TIMES DAILY
Qty: 10 ML | Refills: 0 | Status: SHIPPED | OUTPATIENT
Start: 2025-02-25

## 2025-02-25 NOTE — TELEPHONE ENCOUNTER
----- Message from Marcos sent at 2/25/2025  3:15 PM CST -----  Contact: Pt   .Type:  Pharmacy Calling to Clarify an RXName of Caller:Lisa Name:[id number] [place] [address] Phone: [phone]: [phone] Name:neomycin-polymyxin-hydrocortisone (CORTISPORIN) 3.5-10,000-1 mg/mL-unit/mL-% otic suspensionWhat do they need to clarify?: drug interactionBest Call Back Number:[phone] Information:  Pt states the pharmacy called her regarding the script for neomycin-polymyxin-hydrocortisone (CORTISPORIN) 3.5-10,000-1 mg/mL-unit/mL-% otic suspension interacts with her furosemide (LASIX) 20 MG tablet  And she is calling to  make the doctor aware or see if she should take the drug.

## 2025-02-25 NOTE — PROGRESS NOTES
OTOLARYNGOLOGY CLINIC NOTE  Date:  2025     Chief complaint:  Chief Complaint   Patient presents with    bleeding ear canal     Left ear only, bleed for 2 days in a row then stopped.      History of Present Illness  Myra Harrison is a 59 y.o. female  presenting today for a new evaluation and treatment of bleeding to left ear canal.  Pt reports waking up seeing blood on her pillow.  Pt reports this occurred twice and she went to ER both times.  Pt reports while there they were focused on her heart condition so she left.  Pt reports she doesn't remember sticking anything in her ears but she does submerge herself in the tub to wash her hair.  Pt reports feeling like something is in her ear.  Pt denies any fullness, pressure of hearing loss.      Review of medical records and prior documentation  Past medical records were reviewed with data pertinent to the chief complaint summarized in the HPI. Information obtained from review of medical records is attributed to respective sources in the HPI with reference to sources of information at their mention. Records reviewed included all recent notes from referring provider, primary care, and related subspecialty evaluations as available. This review of records was performed and additional data obtained to supplement history obtained from the patient and further inform medical decision making involved in formulating a plan of care accounting for all history and treatment relevant to the issues addressed.    Past Medical History  Past Medical History:   Diagnosis Date    Asthma     Brain mass     Depression     Fibromyalgia     Hypertension     Seizures     fell and hit head at grocery         Past Surgical History  Past Surgical History:   Procedure Laterality Date    CARPAL TUNNEL RELEASE      right     SECTION      x 3    cysto/ureteroscopy stent placement      CYSTOSCOPY W/ URETERAL STENT PLACEMENT Left 2020    Procedure: CYSTOSCOPY, WITH  "URETERAL STENT INSERTION;  Surgeon: Yessi Granados MD;  Location: [facility] OR;  Service: Urology;  Laterality: Left;    HYSTERECTOMY      RETROGRADE PYELOGRAPHY N/A 7/20/2020    Procedure: PYELOGRAM, RETROGRADE;  Surgeon: Yessi Granados MD;  Location: [facility] OR;  Service: Urology;  Laterality: N/A;    TONSILLECTOMY      URETEROSCOPIC REMOVAL OF URETERIC CALCULUS Left 7/31/2020    Procedure: Cystoscopy, possible retrograde pyelogram, ureteroscopy with laser lithotripsy, placement of ureteral stent;  Surgeon: Yessi Granados MD;  Location: [facility] OR;  Service: Urology;  Laterality: Left;  RN PREOP 7/29/2020---COVID NEGATIVE ON 7/29      Medications  Medications Ordered Prior to Encounter[1]    Review of Systems  Review of Systems   Constitutional: Negative.    HENT: Negative.     Eyes: Negative.    Respiratory: Negative.     Cardiovascular: Negative.    Gastrointestinal: Negative.    Skin: Negative.       Social History   reports that she has never smoked. She has never used smokeless tobacco. She reports current alcohol use. She reports that she does not use drugs.     Family History  Family History   Problem Relation Name Age of Onset    Cancer Mother          Breast cancer    Heart disease Mother      Diabetes Father      Cancer Maternal Grandmother          Lung Cancer    Cancer Maternal Grandfather          Lung Cancer    Cancer Paternal Grandmother      Cancer Paternal Grandfather        Physical Exam   Vitals:    02/25/25 1114   BP: 114/74    Body mass index is 29.52 kg/m².  Weight: 78 kg (171 lb 15.3 oz)   Height: 5' 4" (162.6 cm)     GENERAL: no acute distress.  HEAD: normocephalic.   EYES: lids and lashes normal. No scleral icterus  EARS: external ear without lesion, normal pinna shape and position.  Right: external auditory canal with normal cerumen, tympanic membrane fully visible, no perforation , no retraction. No middle ear effusion. Left external auditory canal with dried blood to bottom of " canal which was removed with suction.  Small area still remains which is healing.  TM visible no perforation or retraction noted.    NOSE: external nose without significant bony abnormality  ORAL CAVITY/OROPHARYNX: tongue midline and mobile. Symmetric palate rise.    NECK: trachea midline.   LYMPH NODES:No cervical lymphadenopathy.  RESPIRATORY: no stridor, no stertor. Voice normal. Respirations nonlabored.  NEURO: alert, responds to questions appropriately.   PSYCH:mood appropriate    Imaging:  The patient does not have any pertinent and/or recent imaging of the head and neck.     Labs:  CBC  Recent Labs   Lab 05/01/24  1158 02/03/25  1622 02/20/25  1448   WBC 7.97 8.27 7.21   Hemoglobin 14.8 14.2 14.7   Hematocrit 44.1 44.0 43.7   MCV 86 92 89   Platelets 307 252 266     BMP  Recent Labs   Lab 02/03/25  1622 02/12/25  0957 02/20/25  1448   Glucose 88 98 96   Sodium 139 142 142   Potassium 4.0 4.1 3.8   Chloride 107 112 H 111 H   CO2 24 22 L 21 L   BUN 15 17 20   Creatinine 0.8 1.0 1.1   Calcium 9.5 9.6 9.8     COAGS  Recent Labs   Lab 02/01/24  1244 02/03/25  1622   INR 1.0 1.0     Assessment  1. Bleeding from ear, unspecified laterality  - Ambulatory referral/consult to ENT  - neomycin-polymyxin-hydrocortisone (CORTISPORIN) 3.5-10,000-1 mg/mL-unit/mL-% otic suspension; Place 3 drops into the left ear 3 (three) times daily.  Dispense: 10 mL; Refill: 0     Plan:  Pt advised to use ear drops as ordered. Pt advised not to use q-tips in her ear.  Pt also advised of dry ear precautions.  F/u prn.  Discussed plan of care with patient in detail and all questions answered. Patient reported understanding of plan of care.        [1]   Current Outpatient Medications on File Prior to Visit   Medication Sig Dispense Refill    apixaban (ELIQUIS) 5 mg Tab Take 1 tablet (5 mg total) by mouth 2 (two) times daily. 60 tablet 11    ergocalciferol (ERGOCALCIFEROL) 50,000 unit Cap Take 50,000 Units by mouth once a week.       fluticasone-salmeterol diskus inhaler 250-50 mcg Inhale 1 puff into the lungs 2 (two) times daily. Controller      furosemide (LASIX) 20 MG tablet Take 1 tablet (20 mg total) by mouth as needed (1-2 pills as needed for fluid overload). 90 tablet 3    metoprolol succinate (TOPROL-XL) 25 MG 24 hr tablet Take 1 tablet (25 mg total) by mouth once daily. 90 tablet 3    ondansetron (ZOFRAN) 4 MG tablet Take 1 tablet (4 mg total) by mouth every 8 (eight) hours as needed. 12 tablet 0    ondansetron (ZOFRAN-ODT) 4 MG TbDL Take 1 tablet (4 mg total) by mouth every 6 (six) hours as needed. 30 tablet 0    pantoprazole (PROTONIX) 40 MG tablet Take 40 mg by mouth once daily.      promethazine (PHENERGAN) 12.5 MG Tab Take 12.5 mg by mouth 4 (four) times daily.      rosuvastatin (CRESTOR) 10 MG tablet Take 10 mg by mouth once daily.      sacubitriL-valsartan (ENTRESTO) 24-26 mg per tablet Take 1 tablet by mouth 2 (two) times daily. 180 tablet 3    sertraline (ZOLOFT) 50 MG tablet Take 50 mg by mouth once daily.      spironolactone (ALDACTONE) 25 MG tablet Take 0.5 tablets (12.5 mg total) by mouth once daily. 45 tablet 3    UNABLE TO FIND medication name: lisinopril - unknown of mg      albuterol sulfate 2.5 mg/0.5 mL Nebu Take 2.5 mg by nebulization every 4 (four) hours as needed (wheeze). Rescue 25 each 0    [DISCONTINUED] gabapentin (NEURONTIN) 300 MG capsule Take 300 mg by mouth 3 (three) times daily. (Patient not taking: Reported on 4/24/2024)      [DISCONTINUED] LIDOcaine (LIDODERM) 5 % Place 1 patch onto the skin once daily. Apply patch for 12 hours and then leave off for 12 hours 15 patch 0    [DISCONTINUED] ondansetron (ZOFRAN-ODT) 4 MG TbDL Take 1 tablet (4 mg total) by mouth every 6 (six) hours as needed (Nausea). 20 tablet 0    [DISCONTINUED] oxybutynin (DITROPAN) 5 MG Tab Take 1 tablet (5 mg total) by mouth 3 (three) times daily as needed (bladder spasms). 30 tablet 0     No current facility-administered medications on  file prior to visit.

## 2025-02-26 ENCOUNTER — TELEPHONE (OUTPATIENT)
Dept: CARDIOLOGY | Facility: CLINIC | Age: 60
End: 2025-02-26
Payer: COMMERCIAL

## 2025-02-26 ENCOUNTER — PATIENT MESSAGE (OUTPATIENT)
Dept: CARDIOLOGY | Facility: CLINIC | Age: 60
End: 2025-02-26
Payer: COMMERCIAL

## 2025-02-26 NOTE — TELEPHONE ENCOUNTER
Spoke with patient in regards getting testing approved by insurance company.  I will call patient insurance company on tomorrow to request an appeal.

## 2025-02-26 NOTE — TELEPHONE ENCOUNTER
----- Message from Katiuska sent at 2/26/2025 10:35 AM CST -----  Who called:self  What is the request in detail:pt is calling in regards of the insurance deny her appt for 02-27-25 she would like for you to give her a call  Can the clinic reply by [facility]? Would the patient rather a call back or a response via [facility]?callback  Best call back number:[phone] Additional Information:she want to know does she still need to do blood work

## 2025-02-27 ENCOUNTER — TELEPHONE (OUTPATIENT)
Dept: OTOLARYNGOLOGY | Facility: CLINIC | Age: 60
End: 2025-02-27
Payer: COMMERCIAL

## 2025-02-27 ENCOUNTER — LAB VISIT (OUTPATIENT)
Dept: LAB | Facility: HOSPITAL | Age: 60
End: 2025-02-27
Attending: INTERNAL MEDICINE
Payer: COMMERCIAL

## 2025-02-27 DIAGNOSIS — R79.89 ELEVATED BRAIN NATRIURETIC PEPTIDE (BNP) LEVEL: ICD-10-CM

## 2025-02-27 LAB
ESTIMATED AVG GLUCOSE: 103 MG/DL (ref 68–131)
HBA1C MFR BLD: 5.2 % (ref 4–5.6)

## 2025-02-27 PROCEDURE — 36415 COLL VENOUS BLD VENIPUNCTURE: CPT | Performed by: INTERNAL MEDICINE

## 2025-02-27 PROCEDURE — 83036 HEMOGLOBIN GLYCOSYLATED A1C: CPT | Performed by: INTERNAL MEDICINE

## 2025-02-27 NOTE — TELEPHONE ENCOUNTER
----- Message from STEPHANIE Rose sent at 2/26/2025  4:53 PM CST -----  Contact: Pt  Advise pt its ok to not take the medication.  The medication hasn't been a problem in the past since she is using it in her ears but given her multiple other health conditions she can let her ear continue to heal naturally without medication.  ----- Message -----  From: Jez Peters MA  Sent: 2/25/2025   3:54 PM CST  To: STEPHANIE Quintero      ----- Message -----  From: Marcos North  Sent: 2/25/2025   3:20 PM CST  To: Marlon Amos Staff     .Type:  Pharmacy Calling to Clarify an RXName of Caller:PtPharmacanupama Name:[facility]/pharmacy [facility][id number] - [place] - [address] Phone: [phone]: [phone] Name:neomycin-polymyxin-hydrocortisone (CORTISPORIN) 3.5-10,000-1 mg/mL-unit/mL-% otic suspensionWhat do they need to clarify?: drug interactionBest Call Back Number:[phone] Information:  Pt states the pharmacy called her regarding the script for neomycin-polymyxin-hydrocortisone (CORTISPORIN) 3.5-10,000-1 mg/mL-unit/mL-% otic suspension interacts with her furosemide (LASIX) 20 MG tablet  And she is calling to  make the doctor aware or see if she should take the drug.

## 2025-02-28 ENCOUNTER — PATIENT MESSAGE (OUTPATIENT)
Dept: CARDIOLOGY | Facility: CLINIC | Age: 60
End: 2025-02-28
Payer: COMMERCIAL

## 2025-03-10 ENCOUNTER — TELEPHONE (OUTPATIENT)
Dept: CARDIOLOGY | Facility: CLINIC | Age: 60
End: 2025-03-10
Payer: COMMERCIAL

## 2025-03-10 NOTE — TELEPHONE ENCOUNTER
Patient states that she was placed on spironalactone 25mg and she read the side effects and it states it has a high risk if giving you breast cancer and she has a family history of breast cancer and her mother passed from it. She also states that she thinks that the metoprolol is giving her heart burn with no relief from protonix, zantac, prilosec. Please advise

## 2025-03-10 NOTE — TELEPHONE ENCOUNTER
----- Message from [employer] sent at 3/10/2025  1:40 PM CDT -----  Who called:selfWhat is the request in detail: in regards to medications that the dr prescribed recently Can the clinic reply by [facility]?no Would the patient rather a call back or a [facility]:.[phone] Information:

## 2025-03-13 ENCOUNTER — TELEPHONE (OUTPATIENT)
Dept: CARDIOLOGY | Facility: CLINIC | Age: 60
End: 2025-03-13
Payer: COMMERCIAL

## 2025-03-13 ENCOUNTER — NURSE TRIAGE (OUTPATIENT)
Dept: ADMINISTRATIVE | Facility: CLINIC | Age: 60
End: 2025-03-13
Payer: COMMERCIAL

## 2025-03-13 NOTE — TELEPHONE ENCOUNTER
Patient wants to confirm what medications she is supposed to be taking because she didn't know she was getting new medication.

## 2025-03-13 NOTE — TELEPHONE ENCOUNTER
Pt reports having bad heartburn for 3 days, has tried to call the office, but has not gotten a call back as of yet, but did get a prescription sent to her pharmacy for Entresto. Pt wanting to know if she is suppose to take that med along with her already prescribed Toprol XL 25 mg daily and the Aldactone 25mg daily, or was that medication to replace a med?. Call placed to [facility], Dr. Hanson, who advised that he did not think any of those meds would be what is causing her heartburn and that she can take all three meds as prescribed. Pt informed and still would like to speak to someone in Dr. Chauhan's office. Pt was offered to be triaged for her heartburn symptoms, but did not want to do so at this time, so was encouraged to call back with any worsening symptoms or questions. She verbalized understanding.    Reason for Disposition   Caller has URGENT medicine question about med that PCP or specialist prescribed and triager unable to answer question    Additional Information   Negative: Intentional drug overdose and suicidal thoughts or ideas   Negative: MORE THAN A DOUBLE DOSE of a prescription or over-the-counter (OTC) drug   Negative: DOUBLE DOSE (an extra dose or lesser amount) of prescription drug and any symptoms (e.g., dizziness, nausea, pain, sleepiness)   Negative: DOUBLE DOSE (an extra dose or lesser amount) of over-the-counter (OTC) drug and any symptoms (e.g., dizziness, nausea, pain, sleepiness)   Negative: Took another person's prescription drug   Negative: DOUBLE DOSE (an extra dose or lesser amount) of prescription drug and NO symptoms  (Exception: A double dose of antibiotics.)   Negative: Diabetes drug error or overdose (e.g., took wrong type of insulin or took extra dose)   Negative: Caller has medication question about med NOT prescribed by PCP and triager unable to answer question (e.g., compatibility with other med, storage)   Negative: Prescription not at pharmacy and was prescribed  by PCP recently  (Exception: triager has access to EMR and prescription is recorded there. Go to Home [facility] and confirm for pharmacy.)   Negative: Pharmacy calling with prescription question and triager unable to answer question    Protocols used: Medication Question Call-A-OH

## 2025-03-13 NOTE — TELEPHONE ENCOUNTER
----- Message from Lyn sent at 3/13/2025 12:51 PM CDT -----  Regarding: self  Type: Patient Call Back Who called:self What is the request in detail:pt is stating a new medication was called in for her and she was not aware of this. The pharmacy asked the pt if she had talked to the office and she had not. Pt doesn't know the name of the medication  Can the clinic reply by [facility]? No Would the patient rather a call back or a response via [facility]? Call back Best call back number: [phone] Additional Information: Thank you.

## 2025-03-14 DIAGNOSIS — I82.402 LEG DVT (DEEP VENOUS THROMBOEMBOLISM), ACUTE, LEFT: ICD-10-CM

## 2025-03-14 RX ORDER — APIXABAN 5 MG/1
5 TABLET, FILM COATED ORAL 2 TIMES DAILY
Qty: 60 TABLET | Refills: 11 | Status: CANCELLED | OUTPATIENT
Start: 2025-03-14 | End: 2026-03-14

## 2025-03-19 ENCOUNTER — OFFICE VISIT (OUTPATIENT)
Dept: CARDIOLOGY | Facility: CLINIC | Age: 60
End: 2025-03-19
Payer: COMMERCIAL

## 2025-03-19 VITALS
RESPIRATION RATE: 15 BRPM | DIASTOLIC BLOOD PRESSURE: 76 MMHG | BODY MASS INDEX: 28.7 KG/M2 | HEIGHT: 64 IN | HEART RATE: 57 BPM | SYSTOLIC BLOOD PRESSURE: 131 MMHG | OXYGEN SATURATION: 96 % | WEIGHT: 168.13 LBS

## 2025-03-19 DIAGNOSIS — R79.89 ELEVATED BRAIN NATRIURETIC PEPTIDE (BNP) LEVEL: ICD-10-CM

## 2025-03-19 DIAGNOSIS — I50.9 CONGESTIVE HEART FAILURE, UNSPECIFIED HF CHRONICITY, UNSPECIFIED HEART FAILURE TYPE: ICD-10-CM

## 2025-03-19 DIAGNOSIS — Z79.01 ANTICOAGULATED: ICD-10-CM

## 2025-03-19 DIAGNOSIS — R07.9 CHEST PAIN, UNSPECIFIED TYPE: Primary | ICD-10-CM

## 2025-03-19 DIAGNOSIS — R06.02 SOB (SHORTNESS OF BREATH): ICD-10-CM

## 2025-03-19 DIAGNOSIS — I82.4Y9 DEEP VEIN THROMBOSIS (DVT) OF PROXIMAL LOWER EXTREMITY, UNSPECIFIED CHRONICITY, UNSPECIFIED LATERALITY: ICD-10-CM

## 2025-03-19 PROCEDURE — 99214 OFFICE O/P EST MOD 30 MIN: CPT | Mod: S$GLB,,, | Performed by: INTERNAL MEDICINE

## 2025-03-19 PROCEDURE — 3075F SYST BP GE 130 - 139MM HG: CPT | Mod: CPTII,S$GLB,, | Performed by: INTERNAL MEDICINE

## 2025-03-19 PROCEDURE — 99999 PR PBB SHADOW E&M-EST. PATIENT-LVL V: CPT | Mod: PBBFAC,,, | Performed by: INTERNAL MEDICINE

## 2025-03-19 PROCEDURE — G2211 COMPLEX E/M VISIT ADD ON: HCPCS | Mod: S$GLB,,, | Performed by: INTERNAL MEDICINE

## 2025-03-19 PROCEDURE — 3008F BODY MASS INDEX DOCD: CPT | Mod: CPTII,S$GLB,, | Performed by: INTERNAL MEDICINE

## 2025-03-19 PROCEDURE — 3078F DIAST BP <80 MM HG: CPT | Mod: CPTII,S$GLB,, | Performed by: INTERNAL MEDICINE

## 2025-03-19 PROCEDURE — 3044F HG A1C LEVEL LT 7.0%: CPT | Mod: CPTII,S$GLB,, | Performed by: INTERNAL MEDICINE

## 2025-03-19 PROCEDURE — 1159F MED LIST DOCD IN RCRD: CPT | Mod: CPTII,S$GLB,, | Performed by: INTERNAL MEDICINE

## 2025-03-19 PROCEDURE — 4010F ACE/ARB THERAPY RXD/TAKEN: CPT | Mod: CPTII,S$GLB,, | Performed by: INTERNAL MEDICINE

## 2025-03-19 NOTE — PROGRESS NOTES
CARDIOVASCULAR CONSULTATION    REASON FOR CONSULT:   Myra Harrison is a 59 y.o. female who presents for   Chief Complaint   Patient presents with    Follow-up     HISTORY OF PRESENT ILLNESS:     Patient is a pleasant 58-year-old lady.  Had knee surgery done last year.  After that DVT of left femoral vein.  Has had multiple ultrasounds done since then which have revealed partially occlusive DVT of left femoral vein, including 1 done recently at [facility].  She has musculoskeletal myalgias and bilateral leg pains which occur from the thighs all the way down to the feet.  No particular aggravating or relieving factors.  She has brought a she would from her primary care physician requesting a CTA angiogram of the legs to check for circulation.  She did have CT angiogram of her lungs performed in November which did not reveal any pulmonary embolism.  Denies orthopnea PND.  Does have chronic cough.      Impression:     1. No evidence of PE.  2. No acute intrathoracic abnormalities identified.  3. Stable 1.2 cm right lower lobe pulmonary nodule, requiring no follow-up as appears stable compared to remote CT from 2015 and likely reflecting benign etiology.    Impression:     Suspected ongoing nonocclusive thrombus in the left femoral vein.  No DVT in the right lower extremity.       Impression:     Unchanged appearance of nonocclusive thrombus within the proximal to mid aspect of the left femoral vein.        CONCLUSIONS     Normal left ventricular systolic function.     Calculated left ventricular ejection fraction by 2D tracking is 60 %.     Normal diastolic function.     There were no regional wall motion abnormalities.     Indeterminate right ventricular systolic function.       - TAPSE 12.4mm, S' 10.3cm/s.       - Normal Right ventricular systolic pressure 26.2 mmHg.     Normal atrial dimensions.     Normal cardiac valve structures.     Normal doppler study - trace MR, TR, HI only.    Findings:     Right  CFV: Patent   Right GSV: Patent   Right PFV: Patent   Right SFV Prox: Patent   Right SFV Mid: Patent   Right SFV Distal: Patent   Right Popliteal: Patent   Right Peroneal: Patent   Right PTV: Patent     Left CFV: Patent   Left GSV: Patent   Left PFV: Patent   Left SFV Prox: Partially Occluded   Left SFV Mid: Patent   Left SFV Distal: Patent   Left Popliteal: Patent   Left Peroneal: Patent   Left PTV: Patent      Notes from April 2024: Patient here for follow-up.  Denies any chest pains at rest on exertion, orthopnea, PND.  Has been doing fine.  Main complaint is chronic dyspnea on exertion which has been going on for many years      02/25/2021 Dobutamine Stress Test  Findings:   The left ventricle is normal in size.   Post stress SPECT images demonstrate normal perfusion in all regions. The rest images reveal no reversibility. Gated SPECT imaging demonstrates normal wall motion in all regions with a left ventricular ejection fraction estimated to be 68.00%.      Impression:  Stress ECG portion was negative for ischemia.   The overall study quality is good.   Normal perfusion scan with no evidence of inducible ischemia or areas of infarct      There is no prior study for comparison    November 21st 2024    History of Present Illness    CHIEF COMPLAINT:  Myra presents today for leg pain.    LEG PAIN:  She reports constant pain in both legs. The exact location within the legs and specific cause are not identified.    CHEST DISCOMFORT:  She experiences occasional chest pain, including a cramp during the EKG procedure. She denies constant chest pressure or pain related to specific activities such as walking or lying down.    CIRCULATION:  Recent venous test revealed a chronic clot in the superficial veins, which may be causing inflammation and pain. No other circulatory concerns were noted.    MEDICATIONS:  She continues adherence to prescribed blood thinners.    WEIGHT:  She reports being overweight.         Recent  ultrasound done in November 20, 2024 demonstrated no evidence of right lower extremity DVT, left common femoral vein was patent and left superficial femoral proximal vein at partial compressibility with chronic thrombus as well as left superficial femoral middle vein had partial compressible lip 80 with chronic thrombus    Arterial ultrasound showed no significant flow-limiting stenosis    Feb 25:    History of Present Illness    CHIEF COMPLAINT:  Myra presents today for fatigue, leg pain, and back pain.    CURRENT SYMPTOMS:  She reports fatigue with generalized weakness. She experiences pain in both legs and back. She reports chest pain occurring multiple times daily, varying in location from center to left side of chest, upper chest, and back with radiation under the arm. She experiences shortness of breath. She has a persistent cough despite taking allergy medications and Albuterol.    RECENT ER VISIT:  She reports an ER visit on Monday where BNP was elevated. She received oral medication and IV placement without medication administration.    FLUID RETENTION:  She reports fluid retention despite being on Triamterene, noting she is unable to wear her wedding ring.    MEDICAL HISTORY:  She has a history of blood clots  with confirmed involvement of the left leg.    CURRENT MEDICATIONS:  She takes eliquis 5 mg twice daily, triamterene hydrochlorothiazide, Albuterol, allergy medications, and Crestor. She reports taking Lisinopril, though dosage is unclear and medication is not on official medication list.         February 21, 2025    History of Present Illness    CHIEF COMPLAINT:  Myra presents today for follow up after an ER visit due to passing out while sitting in a chair    HISTORY OF PRESENT ILLNESS:  She experienced a syncopal episode while sitting in a chair at the ER, losing consciousness with no memory of the event. She denies any prior history of passing out. She reports intermittent dizziness upon  "standing, leg pain, numbness and tingling in fingertips, and cold feet with finger swelling. She noted blood on her pillow from ear bleeding yesterday morning.  On her last visit she was initiated on spironolactone and Entresto and feels much better.  Her swelling has gone away as well as her shortness of breath has improved significantly.  Her BNP has also normalized on this therapy    MEDICAL HISTORY:  She has history of congestive heart failure and DVT requiring lifelong anticoagulation therapy.    FAMILY HISTORY:  Father has diabetes.    CURRENT MEDICATIONS:  She takes metoprolol, rosuvastatin, Entresto, spironolactone, Eliquis, and Lasix as needed.       March 2025    History of Present Illness    CHIEF COMPLAINT:  Myra presents today for evaluation of chest pain and shortness of breath.    CARDIOVASCULAR SYMPTOMS:  She reports experiencing chest pain periodically for the past 1-2 months, occurring both at rest and with activity. She experiences palpitations described as a racing heart sensation multiple times daily. Associated symptoms include facial flushing and redness, describing her face feeling "on fire." No specific triggers for the chest pain episodes are identified. She has had four emergency department visits in the past month.    RESPIRATORY:  She reports dyspnea with exertion, limiting her walking distance. She becomes short of breath with household activities such as mopping and while walking at the mall.    MUSCULOSKELETAL:  She experiences burning sensation in both legs when walking and exercising. She reports charley horses in her calves at night that are painful to touch.    MEDICATIONS:  Current medications include Lasix 20mg daily, metoprolol 25mg daily, Crestor, spironolactone half pill, Eliquis, and Entresto. She reports heartburn with medications, though specific medication causing the symptom has not been identified.    SOCIAL HISTORY:  She is a never smoker but reports significant " secondhand smoke exposure throughout her life due to both parents smoking during her childhood and beyond.         PAST MEDICAL HISTORY:     Past Medical History:   Diagnosis Date    Asthma     Brain mass     Depression     Fibromyalgia     Hypertension     Seizures 2007    fell and hit head at grocery          PAST SURGICAL HISTORY:     Past Surgical History:   Procedure Laterality Date    CARPAL TUNNEL RELEASE      right     SECTION      x 3    cysto/ureteroscopy stent placement      CYSTOSCOPY W/ URETERAL STENT PLACEMENT Left 2020    Procedure: CYSTOSCOPY, WITH URETERAL STENT INSERTION;  Surgeon: Yessi Granados MD;  Location: [facility] OR;  Service: Urology;  Laterality: Left;    HYSTERECTOMY      RETROGRADE PYELOGRAPHY N/A 2020    Procedure: PYELOGRAM, RETROGRADE;  Surgeon: Yessi Granados MD;  Location: [facility] OR;  Service: Urology;  Laterality: N/A;    TONSILLECTOMY      URETEROSCOPIC REMOVAL OF URETERIC CALCULUS Left 2020    Procedure: Cystoscopy, possible retrograde pyelogram, ureteroscopy with laser lithotripsy, placement of ureteral stent;  Surgeon: Yessi Granados MD;  Location: [facility] OR;  Service: Urology;  Laterality: Left;  RN PREOP 2020---COVID NEGATIVE ON            SOCIAL HISTORY:     Social History     Socioeconomic History    Marital status:    Tobacco Use    Smoking status: Never    Smokeless tobacco: Never   Substance and Sexual Activity    Alcohol use: Yes     Comment: occasionally    Drug use: No    Sexual activity: Yes     Social Drivers of Health     Financial Resource Strain: Low Risk  (2025)    Overall Financial Resource Strain (CARDIA)     Difficulty of Paying Living Expenses: Not hard at all   Food Insecurity: No Food Insecurity (2025)    Hunger Vital Sign     Worried About Running Out of Food in the Last Year: Never true     Ran Out of Food in the Last Year: Never true   Transportation Needs: No Transportation Needs  "(2/24/2025)    PRAPARE - Transportation     Lack of Transportation (Medical): No     Lack of Transportation (Non-Medical): No   Physical Activity: Sufficiently Active (2/24/2025)    Exercise Vital Sign     Days of Exercise per Week: 3 days     Minutes of Exercise per Session: 60 min   Stress: No Stress Concern Present (2/24/2025)    [employer] [place] [employer] - Occupational Stress Questionnaire     Feeling of Stress : Not at all   Housing Stability: Low Risk  (2/24/2025)    Housing Stability Vital Sign     Unable to Pay for Housing in the Last Year: No     Number of Times Moved in the Last Year: 0     Homeless in the Last Year: No       FAMILY HISTORY:     Family History   Problem Relation Name Age of Onset    Cancer Mother          Breast cancer    Heart disease Mother      Diabetes Father      Cancer Maternal Grandmother          Lung Cancer    Cancer Maternal Grandfather          Lung Cancer    Cancer Paternal Grandmother      Cancer Paternal Grandfather         REVIEW OF SYSTEMS:   Review of Systems   Constitutional: Negative.   HENT: Negative.     Eyes: Negative.    Cardiovascular:  Positive for chest pain and dyspnea on exertion.   Respiratory:  Positive for shortness of breath.    Endocrine: Negative.    Hematologic/Lymphatic: Negative.    Skin: Negative.    Musculoskeletal:  Positive for joint pain and myalgias.   Gastrointestinal: Negative.    Genitourinary: Negative.    Neurological: Negative.    Psychiatric/Behavioral: Negative.     Allergic/Immunologic: Negative.        A 10 point review of systems was performed and all the pertinent positives have been mentioned. Rest of review of systems was negative.        PHYSICAL EXAM:     Vitals:    03/19/25 1411   BP: 131/76   Pulse: (!) 57   Resp: 15    Body mass index is 28.85 kg/m².  Weight: 76.2 kg (168 lb 1.6 oz)   Height: 5' 4" (162.6 cm)     Physical Exam  Constitutional:       Appearance: Normal appearance. She is well-developed.   HENT:     "  Head: Normocephalic.   Eyes:      Pupils: Pupils are equal, round, and reactive to light.   Cardiovascular:      Rate and Rhythm: Normal rate and regular rhythm.   Pulmonary:      Effort: Pulmonary effort is normal.      Breath sounds: Normal breath sounds.   Abdominal:      General: Bowel sounds are normal.      Palpations: Abdomen is soft.      Tenderness: There is no abdominal tenderness.   Musculoskeletal:         General: Normal range of motion.      Cervical back: Normal range of motion and neck supple.   Skin:     General: Skin is warm.   Neurological:      Mental Status: She is alert and oriented to person, place, and time.           DATA:     Laboratory:  CBC:  Recent Labs   Lab 05/01/24  1158 02/03/25  1622 02/20/25  1448   WBC 7.97 8.27 7.21   Hemoglobin 14.8 14.2 14.7   Hematocrit 44.1 44.0 43.7   Platelets 307 252 266       CHEMISTRIES:  Recent Labs   Lab 02/03/25  1622 02/12/25  0957 02/20/25  1448   Glucose 88 98 96   Sodium 139 142 142   Potassium 4.0 4.1 3.8   BUN 15 17 20   Creatinine 0.8 1.0 1.1   Calcium 9.5 9.6 9.8       CARDIAC BIOMARKERS:  Recent Labs   Lab 11/15/23  1657 12/29/23  1128 02/01/24  1244 02/03/25  1623 02/20/25  1448   CPK  --   --  47 43  --    Troponin I <0.006 <0.006  --   --  <0.006       COAGS:  Recent Labs   Lab 02/01/24  1244 02/03/25  1622   INR 1.0 1.0       LIPIDS/LFTS:  Recent Labs   Lab 02/01/24  1244 02/03/25  1622 02/20/25  1448   AST 21 13 14   ALT 19 11 14       Hemoglobin A1C   Date Value Ref Range Status   02/27/2025 5.2 4.0 - 5.6 % Final     Comment:     ADA Screening Guidelines:  5.7-6.4%  Consistent with prediabetes  >or=6.5%  Consistent with diabetes    High levels of fetal hemoglobin interfere with the HbA1C  assay. Heterozygous hemoglobin variants (HbS, HgC, etc)do  not significantly interfere with this assay.   However, presence of multiple variants may affect accuracy.         TSH        The ASCVD Risk score (Camryn DK, et al., 2019) failed to calculate  for the following reasons:    Cannot find a previous HDL lab    Cannot find a previous total cholesterol lab       BNP    Lab Results   Component Value Date/Time    BNP 36 02/20/2025 02:48 PM     (H) 02/03/2025 04:22 PM    BNP 19 12/29/2023 11:28 AM    BNP 19 11/15/2023 04:57 PM            ECHO    Results for orders placed during the hospital encounter of 07/19/20    Echo Color Flow Doppler? Yes    Interpretation Summary  · Normal left ventricular systolic function. The estimated ejection fraction is 55%.  · Normal LV diastolic function.  · Normal right ventricular systolic function.  · No pulmonary hypertension present.      STRESS TEST    No results found for this or any previous visit.        CATH    No results found for this or any previous visit.              ASSESSMENT AND PLAN     Patient Active Problem List   Diagnosis    Nephrolithiasis    Acute post-traumatic headache, not intractable    Chronic pain disorder    Fibromyalgia    Bacteremia due to Escherichia coli    E. coli UTI    Possible COVID-19 Infection    Essential hypertension    Seizure disorder    Asthma    MDD (major depressive disorder)    Ureteral stone    Left ureteral calculus    Leg DVT (deep venous thromboembolism), acute, left    Anticoagulated    Elevated brain natriuretic peptide (BNP) level    Congestive heart failure         ALLERGIES AND MEDICATION:     Review of patient's allergies indicates:   Allergen Reactions    Penicillins Itching    Codeine Itching        Medication List            Accurate as of March 19, 2025  3:26 PM. If you have any questions, ask your nurse or doctor.                START taking these medications      ELIQUIS 5 mg Tab  Generic drug: apixaban  Take 1 tablet (5 mg total) by mouth 2 (two) times daily.  Started by: Candelario Chauhan MD            CONTINUE taking these medications      albuterol sulfate 2.5 mg/0.5 mL Nebu  Take 2.5 mg by nebulization every 4 (four) hours as needed (wheeze). Rescue      ergocalciferol 50,000 unit Cap  Commonly known as: ERGOCALCIFEROL     fluticasone-salmeterol 250-50 mcg/dose 250-50 mcg/dose diskus inhaler  Commonly known as: ADVAIR     furosemide 20 MG tablet  Commonly known as: LASIX  Take 1 tablet (20 mg total) by mouth as needed (1-2 pills as needed for fluid overload).     metoprolol succinate 25 MG 24 hr tablet  Commonly known as: TOPROL-XL  Take 1 tablet (25 mg total) by mouth once daily.     neomycin-polymyxin-hydrocortisone 3.5-10,000-1 mg/mL-unit/mL-% otic suspension  Commonly known as: CORTISPORIN  Place 3 drops into the left ear 3 (three) times daily.     ondansetron 4 MG tablet  Commonly known as: ZOFRAN  Take 1 tablet (4 mg total) by mouth every 8 (eight) hours as needed.     ondansetron 4 MG Tbdl  Commonly known as: ZOFRAN-ODT  Take 1 tablet (4 mg total) by mouth every 6 (six) hours as needed.     pantoprazole 40 MG tablet  Commonly known as: PROTONIX     promethazine 12.5 MG Tab  Commonly known as: PHENERGAN     rosuvastatin 10 MG tablet  Commonly known as: CRESTOR     sacubitriL-valsartan 24-26 mg per tablet  Commonly known as: ENTRESTO  Take 1 tablet by mouth 2 (two) times daily.     sertraline 50 MG tablet  Commonly known as: ZOLOFT     spironolactone 25 MG tablet  Commonly known as: ALDACTONE  Take 0.5 tablets (12.5 mg total) by mouth once daily.     UNABLE TO FIND               Where to Get Your Medications        These medications were sent to [facility] [id number] [id number]      Hours: Mon-Fri, 8a-5:30p Phone: [phone]   ELIQUIS 5 mg Tab         Orders Placed This Encounter   Procedures    Nuclear Stress - Cardiology Interpreted    Holter monitor - 48 hour    CV Ankle Brachial Indices (GRACIELA)    CV Abdominal Aorta    CV Ultrasound doppler arterial legs bilat    Echo       IMPRESSION:  Chest pain, shortness of breath, and leg symptoms potentially indicative of cardiovascular issues.  Foot swelling  improved.  Evaluated medication side effects, particularly heartburn in relation to current prescriptions.    PLAN SUMMARY:  - Continue Crestor, Eliquis, Lasix 20 mg daily, and Entresto  - Continue Metoprolol 25 mg daily and Spironolactone 12.5 mg   - Order US Legs to check for arterial stenosis  - Order echocardiogram to assess her shortness of breath and dyspnea on exertion  - Order Holter monitor for 2 days to detect arrhythmias  - Order stress test to evaluate chest pain  - Follow up after completing diagnostic tests  - Contact office if heartburn persists after medication adjustments    CHEST PAIN:  - Ordered stress test to evaluate chest pain.  - Ordered echocardiogram to assess for heart failure or pulmonary HTN as a potential cause for her shortness of breath      - Ordered US Legs to check for arterial stenosis to evaluated her calf pain on exertion and claudication    PALPITATIONS:  - Ordered Holter monitor for 2 days to detect arrhythmias.    HEARTBURN:  - Discontinue for 1 week to assess if it is causing heartburn; if no improvement, resume Metoprolol and discontinue Spironolactone for a week.  - Contact the office if heartburn persists after medication adjustments.    CARDIAC MEDICATIONS:  - Continued Lasix 20 mg daily.  - Continued Metoprolol 25 mg daily.  - Continued Crestor.  - Continued Spironolactone 25 mg (half tablet daily).  - Continued Eliquis.  - Continued Entresto.          HEART FAILURE with preserved ejection fraction:  - Explained significance of BNP levels in diagnosing and monitoring heart failure.  - Discussed role of various medications in managing heart failure symptoms.  - Myra to monitor weight daily, noting increases of more than 3 lbs.  - Recommend maintaining regular water intake without overhydrating.  - Decreased spironolactone: Current dose reduced by half.  - Continued Lasix: Use only as needed when weight increases by more than 3 lbs.  - Continued Entresto.  - Continued  Metoprolol.  - Continued Rosuvastatin.  - patient's symptoms and leg swelling have improved significantly since she was initiated on spironolactone and Entresto      ANTICOAGULATION MANAGEMENT for DVT:  - Continued Eliquis.    HYPERLIPIDEMIA:  - Continued Crestor.     DVT: Left lower extremity nonocclusive femoral vein DVT.    On Eliquis already.  Continue Eliquis.  Heme-Onc determined lifelong anticoagulation      FOLLOW-UP:  - Follow up after completing diagnostic tests.       This note was generated with the assistance of ambient listening technology. Verbal consent was obtained by the patient and accompanying visitor(s) for the recording of patient appointment to facilitate this note. I attest to having reviewed and edited the generated note for accuracy, though some syntax or spelling errors may persist. Please contact the author of this note for any clarification.      Thank you very much for involving me in the care of your patient.  Please do not hesitate to contact me if there are any questions.      Candelario Chauhan MD, FAC, [facility]  Interventional Cardiologist, [facility].       Visit today included increased complexity associated with the care of the episodic problem dvt, chronic anticoagulation, ELEVATED BNP AND NEW ONSET CONGESTIVE HEART FAILURE addressed and managing the longitudinal care of the patient due to the serious and/or complex managed problem(s)   Patient Active Problem List   Diagnosis    Nephrolithiasis    Acute post-traumatic headache, not intractable    Chronic pain disorder    Fibromyalgia    Bacteremia due to Escherichia coli    E. coli UTI    Possible COVID-19 Infection    Essential hypertension    Seizure disorder    Asthma    MDD (major depressive disorder)    Ureteral stone    Left ureteral calculus    Leg DVT (deep venous thromboembolism), acute, left    Anticoagulated    Elevated brain natriuretic peptide (BNP) level    Congestive heart failure     .      This note was  dictated with the help of speech recognition software.  There might be un-intended errors and/or substitutions.

## 2025-03-26 ENCOUNTER — HOSPITAL ENCOUNTER (OUTPATIENT)
Dept: RADIOLOGY | Facility: HOSPITAL | Age: 60
Discharge: HOME | End: 2025-03-26
Attending: INTERNAL MEDICINE
Payer: COMMERCIAL

## 2025-03-26 DIAGNOSIS — Z12.31 ENCOUNTER FOR SCREENING MAMMOGRAM FOR MALIGNANT NEOPLASM OF BREAST: ICD-10-CM

## 2025-03-26 PROCEDURE — 77067 SCR MAMMO BI INCL CAD: CPT | Mod: TC

## 2025-03-26 PROCEDURE — 77067 SCR MAMMO BI INCL CAD: CPT | Mod: 26,,, | Performed by: RADIOLOGY

## 2025-03-26 PROCEDURE — 77063 BREAST TOMOSYNTHESIS BI: CPT | Mod: 26,,, | Performed by: RADIOLOGY

## 2025-04-02 ENCOUNTER — TELEPHONE (OUTPATIENT)
Dept: CARDIOLOGY | Facility: CLINIC | Age: 60
End: 2025-04-02
Payer: COMMERCIAL

## 2025-04-14 ENCOUNTER — HOSPITAL ENCOUNTER (OUTPATIENT)
Dept: RADIOLOGY | Facility: HOSPITAL | Age: 60
Discharge: HOME | End: 2025-04-14
Attending: INTERNAL MEDICINE
Payer: COMMERCIAL

## 2025-04-14 DIAGNOSIS — R92.8 OTHER ABNORMAL AND INCONCLUSIVE FINDINGS ON DIAGNOSTIC IMAGING OF BREAST: ICD-10-CM

## 2025-04-14 PROCEDURE — 77065 DX MAMMO INCL CAD UNI: CPT | Mod: TC,RT

## 2025-04-14 PROCEDURE — 77065 DX MAMMO INCL CAD UNI: CPT | Mod: 26,RT,, | Performed by: RADIOLOGY

## 2025-04-14 PROCEDURE — 77061 BREAST TOMOSYNTHESIS UNI: CPT | Mod: 26,RT,, | Performed by: RADIOLOGY

## 2025-04-14 NOTE — TELEPHONE ENCOUNTER
Spoke with patient she states that [facility] was [facility]. She states [facility] is cheaper and she is requesting for it to be sent to [facility].

## 2025-04-14 NOTE — TELEPHONE ENCOUNTER
----- Message from Katiuska sent at 4/14/2025  9:58 AM CDT -----  Who called:SELF What is the request in detail:PT WOULD LIKE FOR YOU TO GIVE HER A CALL  Can the clinic reply by [facility]? Would the patient rather a call back or a response via [facility]?CALLBACK  Best call back number:[phone] Additional Information:

## 2025-04-29 ENCOUNTER — TELEPHONE (OUTPATIENT)
Dept: CARDIOLOGY | Facility: CLINIC | Age: 60
End: 2025-04-29
Payer: COMMERCIAL

## 2025-05-01 ENCOUNTER — HOSPITAL ENCOUNTER (OUTPATIENT)
Dept: CARDIOLOGY | Facility: HOSPITAL | Age: 60
Discharge: HOME | End: 2025-05-01
Attending: INTERNAL MEDICINE
Payer: COMMERCIAL

## 2025-05-01 ENCOUNTER — HOSPITAL ENCOUNTER (OUTPATIENT)
Dept: RADIOLOGY | Facility: HOSPITAL | Age: 60
Discharge: HOME | End: 2025-05-01
Attending: INTERNAL MEDICINE
Payer: COMMERCIAL

## 2025-05-01 VITALS — BODY MASS INDEX: 28.68 KG/M2 | HEIGHT: 64 IN | WEIGHT: 168 LBS

## 2025-05-01 DIAGNOSIS — R06.02 SOB (SHORTNESS OF BREATH): ICD-10-CM

## 2025-05-01 DIAGNOSIS — R07.9 CHEST PAIN, UNSPECIFIED TYPE: ICD-10-CM

## 2025-05-01 DIAGNOSIS — I82.402 LEG DVT (DEEP VENOUS THROMBOEMBOLISM), ACUTE, LEFT: ICD-10-CM

## 2025-05-01 DIAGNOSIS — G89.4 CHRONIC PAIN DISORDER: ICD-10-CM

## 2025-05-01 DIAGNOSIS — I50.9 CONGESTIVE HEART FAILURE, UNSPECIFIED HF CHRONICITY, UNSPECIFIED HEART FAILURE TYPE: Primary | ICD-10-CM

## 2025-05-01 LAB
ABDOMINAL IMA AP: 1.46 CM
ABDOMINAL IMA ED VEL: 0 CM/S
ABDOMINAL IMA PS VEL: 76 CM/S
ABDOMINAL IMA TRANS: 1.46 CM
ABDOMINAL INFRARENAL AORTA AP: 1.53 CM
ABDOMINAL INFRARENAL AORTA ED VEL: 0 CM/S
ABDOMINAL INFRARENAL AORTA PS VEL: 71 CM/S
ABDOMINAL INFRARENAL AORTA TRANS: 1.61 CM
ABDOMINAL JUXTARENAL AORTA AP: 1.55 CM
ABDOMINAL JUXTARENAL AORTA ED VEL: 0 CM/S
ABDOMINAL JUXTARENAL AORTA PS VEL: 81 CM/S
ABDOMINAL JUXTARENAL AORTA TRANS: 1.63 CM
ABDOMINAL LT COM ILIAC AP: 0.97 CM
ABDOMINAL LT COM ILIAC TRANS: 0.95 CM
ABDOMINAL LT COM ILIAC VEL: 86 CM/S
ABDOMINAL LT COM ILLIAC ED VEL: 0 CM/S
ABDOMINAL RT COM ILIAC AP: 0.87 CM
ABDOMINAL RT COM ILIAC TRANS: 0.82 CM
ABDOMINAL RT COM ILIAC VEL: 79 CM/S
ABDOMINAL RT COM ILLIAC ED VEL: 0 CM/S
ABDOMINAL SUPRARENAL AORTA AP: 2.19 CM
ABDOMINAL SUPRARENAL AORTA ED VEL: 0 CM/S
ABDOMINAL SUPRARENAL AORTA PS VEL: 73 CM/S
ABDOMINAL SUPRARENAL AORTA TRANS: 2 CM
ASCENDING AORTA: 3.6 CM
AV INDEX (PROSTH): 0.75
AV MEAN GRADIENT: 3 MMHG
AV PEAK GRADIENT: 6 MMHG
AV VALVE AREA BY VELOCITY RATIO: 2.4 CM²
AV VALVE AREA: 2.3 CM²
AV VELOCITY RATIO: 0.75
BSA FOR ECHO PROCEDURE: 1.85 M2
CV ECHO LV RWT: 0.38 CM
CV STRESS BASE HR: 49 BPM
DIASTOLIC BLOOD PRESSURE: 78 MMHG
DOP CALC AO PEAK VEL: 1.2 M/S
DOP CALC AO VTI: 30.8 CM
DOP CALC LVOT AREA: 3.1 CM2
DOP CALC LVOT DIAMETER: 2 CM
DOP CALC LVOT PEAK VEL: 0.9 M/S
DOP CALC LVOT STROKE VOLUME: 72.2 CM3
DOP CALC MV VTI: 28.8 CM
DOP CALCLVOT PEAK VEL VTI: 23 CM
E WAVE DECELERATION TIME: 205 MSEC
E/A RATIO: 1
E/E' RATIO: 8 M/S
ECHO LV POSTERIOR WALL: 0.9 CM (ref 0.6–1.1)
FRACTIONAL SHORTENING: 22.9 % (ref 28–44)
INTERVENTRICULAR SEPTUM: 0.8 CM (ref 0.6–1.1)
IVC DIAMETER: 1.43 CM
LA MAJOR: 4.5 CM
LA MINOR: 4.2 CM
LEFT ABI: 1.26
LEFT ANT TIBIAL SYS PSV: 49 CM/S
LEFT ATRIUM SIZE: 3.9 CM
LEFT CFA PSV: 94 CM/S
LEFT DORSALIS PEDIS: 163 MMHG
LEFT EXTERNAL ILIAC PSV: 107 CM/S
LEFT INTERNAL DIMENSION IN SYSTOLE: 3.7 CM (ref 2.1–4)
LEFT PERONEAL SYS PSV: 52 CM/S
LEFT POPLITEAL PSV: 63 CM/S
LEFT POST TIBIAL SYS PSV: 66 CM/S
LEFT POSTERIOR TIBIAL: 182 MMHG
LEFT PROFUNDA SYS PSV: 62 CM/S
LEFT SUPER FEMORAL DIST SYS PSV: 65 CM/S
LEFT SUPER FEMORAL MID SYS PSV: 61 CM/S
LEFT SUPER FEMORAL OSTIAL SYS PSV: 90 CM/S
LEFT SUPER FEMORAL PROX SYS PSV: 85 CM/S
LEFT TBI: 1.01
LEFT TIB/PER TRUNK SYS PSV: 60 CM/S
LEFT TOE PRESSURE: 146 MMHG
LEFT VENTRICLE DIASTOLIC VOLUME INDEX: 58.24 ML/M2
LEFT VENTRICLE DIASTOLIC VOLUME: 106 ML
LEFT VENTRICLE MASS INDEX: 75.3 G/M2
LEFT VENTRICLE SYSTOLIC VOLUME INDEX: 32.4 ML/M2
LEFT VENTRICLE SYSTOLIC VOLUME: 59 ML
LEFT VENTRICULAR INTERNAL DIMENSION IN DIASTOLE: 4.8 CM (ref 3.5–6)
LEFT VENTRICULAR MASS: 137.1 G
LV LATERAL E/E' RATIO: 6.7 M/S
LV SEPTAL E/E' RATIO: 9.6 M/S
LVED V (TEICH): 106.2 ML
LVES V (TEICH): 59.41 ML
LVOT MG: 1.63 MMHG
LVOT MV: 0.61 CM/S
MV MEAN GRADIENT: 1 MMHG
MV PEAK A VEL: 0.67 M/S
MV PEAK E VEL: 0.67 M/S
MV PEAK GRADIENT: 3 MMHG
MV STENOSIS PRESSURE HALF TIME: 82.95 MS
MV VALVE AREA BY CONTINUITY EQUATION: 2.51 CM2
MV VALVE AREA P 1/2 METHOD: 2.65 CM2
NUC REST EJECTION FRACTION: 68
OHS CV CPX 85 PERCENT MAX PREDICTED HEART RATE MALE: 137
OHS CV CPX MAX PREDICTED HEART RATE: 161
OHS CV CPX PATIENT IS FEMALE: 1
OHS CV CPX PATIENT IS MALE: 0
OHS CV CPX PEAK DIASTOLIC BLOOD PRESSURE: 77 MMHG
OHS CV CPX PEAK HEAR RATE: 80 BPM
OHS CV CPX PEAK RATE PRESSURE PRODUCT: NORMAL
OHS CV CPX PEAK SYSTOLIC BLOOD PRESSURE: 140 MMHG
OHS CV CPX PERCENT MAX PREDICTED HEART RATE ACHIEVED: 52
OHS CV CPX RATE PRESSURE PRODUCT PRESENTING: 6468
OHS CV INITIAL DOSE: 10.4 MCG/KG/MIN
OHS CV LEFT COMMON ILIAC ARTERY PSV: 86 CM/S
OHS CV PEAK DOSE: 30.6 MCG/KG/MIN
OHS CV RV/LV RATIO: 0.85 CM
OHS CV US RIGHT COMMON ILIAC PSV: 79 CM/S
OHS QRS DURATION: 90 MS
OHS QTC CALCULATION: 411 MS
PISA MRMAX VEL: 6.03 M/S
PISA TR MAX VEL: 2.4 M/S
PULM VEIN S/D RATIO: 0.95
PV PEAK D VEL: 0.55 M/S
PV PEAK GRADIENT: 3 MMHG
PV PEAK S VEL: 0.52 M/S
PV PEAK VELOCITY: 0.8 M/S
RA MAJOR: 5 CM
RA PRESSURE ESTIMATED: 3 MMHG
RIGHT ABI: 1.23
RIGHT ANT TIBIAL SYS PSV: 55 CM/S
RIGHT ARM BP: 145 MMHG
RIGHT CFA PSV: 72 CM/S
RIGHT DORSALIS PEDIS: 173 MMHG
RIGHT EXTERNAL ILLIAC PSV: 121 CM/S
RIGHT PERONEAL SYS PSV: 29 CM/S
RIGHT POPLITEAL PSV: 46 CM/S
RIGHT POST TIBIAL SYS PSV: 55 CM/S
RIGHT POSTERIOR TIBIAL: 179 MMHG
RIGHT PROFUNDA SYS PSV: 48 CM/S
RIGHT SUPER FEMORAL DIST SYS PSV: 59 CM/S
RIGHT SUPER FEMORAL MID SYS PSV: 78 CM/S
RIGHT SUPER FEMORAL OSTIAL SYS PSV: 85 CM/S
RIGHT SUPER FEMORAL PROX SYS PSV: 77 CM/S
RIGHT TBI: 0.9
RIGHT TIB/PER TRUNK SYS PSV: 51 CM/S
RIGHT TOE PRESSURE: 131 MMHG
RIGHT VENTRICLE DIASTOLIC BASEL DIMENSION: 4.1 CM
RIGHT VENTRICULAR END-DIASTOLIC DIMENSION: 4.05 CM
RV TB RVSP: 5 MMHG
RV TISSUE DOPPLER FREE WALL SYSTOLIC VELOCITY 1 (APICAL 4 CHAMBER VIEW): 12.28 CM/S
SINUS: 3.1 CM
STJ: 3 CM
SYSTOLIC BLOOD PRESSURE: 132 MMHG
TDI LATERAL: 0.1 M/S
TDI SEPTAL: 0.07 M/S
TDI: 0.09 M/S
TR MAX PG: 23 MMHG
TRICUSPID ANNULAR PLANE SYSTOLIC EXCURSION: 2 CM
TV REST PULMONARY ARTERY PRESSURE: 26 MMHG
Z-SCORE OF LEFT VENTRICULAR DIMENSION IN END DIASTOLE: -0.49
Z-SCORE OF LEFT VENTRICULAR DIMENSION IN END SYSTOLE: 1.37

## 2025-05-01 PROCEDURE — A9502 TC99M TETROFOSMIN: HCPCS | Performed by: INTERNAL MEDICINE

## 2025-05-01 PROCEDURE — 93005 ELECTROCARDIOGRAM TRACING: CPT

## 2025-05-01 PROCEDURE — 93017 CV STRESS TEST TRACING ONLY: CPT

## 2025-05-01 PROCEDURE — 25000242 PHARM REV CODE 250 ALT 637 W/ HCPCS: Performed by: INTERNAL MEDICINE

## 2025-05-01 PROCEDURE — 78452 HT MUSCLE IMAGE SPECT MULT: CPT

## 2025-05-01 PROCEDURE — 93018 CV STRESS TEST I&R ONLY: CPT | Mod: ,,, | Performed by: INTERNAL MEDICINE

## 2025-05-01 PROCEDURE — 93925 LOWER EXTREMITY STUDY: CPT | Mod: 26,,, | Performed by: INTERNAL MEDICINE

## 2025-05-01 PROCEDURE — 93925 LOWER EXTREMITY STUDY: CPT

## 2025-05-01 PROCEDURE — 25000003 PHARM REV CODE 250: Performed by: INTERNAL MEDICINE

## 2025-05-01 PROCEDURE — 93016 CV STRESS TEST SUPVJ ONLY: CPT | Mod: ,,, | Performed by: INTERNAL MEDICINE

## 2025-05-01 PROCEDURE — 93922 UPR/L XTREMITY ART 2 LEVELS: CPT | Mod: 26,,, | Performed by: INTERNAL MEDICINE

## 2025-05-01 PROCEDURE — 93922 UPR/L XTREMITY ART 2 LEVELS: CPT

## 2025-05-01 PROCEDURE — 93978 VASCULAR STUDY: CPT

## 2025-05-01 PROCEDURE — 93306 TTE W/DOPPLER COMPLETE: CPT

## 2025-05-01 PROCEDURE — 93978 VASCULAR STUDY: CPT | Mod: 26,,, | Performed by: INTERNAL MEDICINE

## 2025-05-01 PROCEDURE — 78452 HT MUSCLE IMAGE SPECT MULT: CPT | Mod: 26,,, | Performed by: INTERNAL MEDICINE

## 2025-05-01 PROCEDURE — 63600175 PHARM REV CODE 636 W HCPCS: Performed by: INTERNAL MEDICINE

## 2025-05-01 PROCEDURE — 93306 TTE W/DOPPLER COMPLETE: CPT | Mod: 26,,, | Performed by: INTERNAL MEDICINE

## 2025-05-01 RX ORDER — ONDANSETRON HYDROCHLORIDE 4 MG/5ML
4 SOLUTION ORAL ONCE
Status: COMPLETED | OUTPATIENT
Start: 2025-05-01 | End: 2025-05-01

## 2025-05-01 RX ORDER — NITROGLYCERIN 0.4 MG/1
0.4 TABLET SUBLINGUAL EVERY 5 MIN PRN
Status: DISCONTINUED | OUTPATIENT
Start: 2025-05-01 | End: 2025-05-09 | Stop reason: HOSPADM

## 2025-05-01 RX ORDER — REGADENOSON 0.08 MG/ML
0.4 INJECTION, SOLUTION INTRAVENOUS ONCE
Status: COMPLETED | OUTPATIENT
Start: 2025-05-01 | End: 2025-05-01

## 2025-05-01 RX ADMIN — TETROFOSMIN 30.6 MILLICURIE: 1.38 INJECTION, POWDER, LYOPHILIZED, FOR SOLUTION INTRAVENOUS at 10:05

## 2025-05-01 RX ADMIN — TETROFOSMIN 10.4 MILLICURIE: 1.38 INJECTION, POWDER, LYOPHILIZED, FOR SOLUTION INTRAVENOUS at 07:05

## 2025-05-01 RX ADMIN — NITROGLYCERIN 0.4 MG: 0.4 TABLET SUBLINGUAL at 11:05

## 2025-05-01 RX ADMIN — REGADENOSON 0.4 MG: 0.08 INJECTION, SOLUTION INTRAVENOUS at 10:05

## 2025-05-01 RX ADMIN — ONDANSETRON HYDROCHLORIDE 4 MG: 4 SOLUTION ORAL at 11:05

## 2025-05-01 NOTE — NURSING
Patient experienced chest pain of 6 out of 10 at the end of her Kasandra stress test.  Patient became nauseated.  Dr. Chauhan informed at 1101 and order received for Zofran.  CP worsened to a 7 out of 10.  Dr. Chauhan informed at 1107 and SL Nitro ordered.  1135, patient's condition improved after drinking caffeinated Coke; however, chest pain remained a 4 out of 10.  Move patient along to have her nuclear scan and spoke to Dr. Chauhan at 1146.  Dr. Chauhan ordered the patient to stay for 15 minutes post nuclear scan and ordered a 12 lead EKG before discharge.  EKG done, and Dr. Chauhan ok'd patient to leave at 1239.

## 2025-05-02 ENCOUNTER — LAB VISIT (OUTPATIENT)
Dept: LAB | Facility: HOSPITAL | Age: 60
End: 2025-05-02
Attending: STUDENT IN AN ORGANIZED HEALTH CARE EDUCATION/TRAINING PROGRAM
Payer: COMMERCIAL

## 2025-05-02 DIAGNOSIS — G89.4 CHRONIC PAIN DISORDER: ICD-10-CM

## 2025-05-02 DIAGNOSIS — I82.402 LEG DVT (DEEP VENOUS THROMBOEMBOLISM), ACUTE, LEFT: ICD-10-CM

## 2025-05-02 LAB
ABSOLUTE EOSINOPHIL (OHS): 0.18 K/UL
ABSOLUTE MONOCYTE (OHS): 0.21 K/UL (ref 0.3–1)
ABSOLUTE NEUTROPHIL COUNT (OHS): 3.32 K/UL (ref 1.8–7.7)
BASOPHILS # BLD AUTO: 0.03 K/UL
BASOPHILS NFR BLD AUTO: 0.5 %
ERYTHROCYTE [DISTWIDTH] IN BLOOD BY AUTOMATED COUNT: 12.2 % (ref 11.5–14.5)
HCT VFR BLD AUTO: 44.4 % (ref 37–48.5)
HGB BLD-MCNC: 13.9 GM/DL (ref 12–16)
IMM GRANULOCYTES # BLD AUTO: 0.03 K/UL (ref 0–0.04)
IMM GRANULOCYTES NFR BLD AUTO: 0.5 % (ref 0–0.5)
LYMPHOCYTES # BLD AUTO: 2.21 K/UL (ref 1–4.8)
MCH RBC QN AUTO: 28.5 PG (ref 27–31)
MCHC RBC AUTO-ENTMCNC: 31.3 G/DL (ref 32–36)
MCV RBC AUTO: 91 FL (ref 82–98)
NUCLEATED RBC (/100WBC) (OHS): 0 /100 WBC
PLATELET # BLD AUTO: 213 K/UL (ref 150–450)
PMV BLD AUTO: 10.7 FL (ref 9.2–12.9)
RBC # BLD AUTO: 4.88 M/UL (ref 4–5.4)
RELATIVE EOSINOPHIL (OHS): 3 %
RELATIVE LYMPHOCYTE (OHS): 37 % (ref 18–48)
RELATIVE MONOCYTE (OHS): 3.5 % (ref 4–15)
RELATIVE NEUTROPHIL (OHS): 55.5 % (ref 38–73)
WBC # BLD AUTO: 5.98 K/UL (ref 3.9–12.7)

## 2025-05-02 PROCEDURE — 36415 COLL VENOUS BLD VENIPUNCTURE: CPT

## 2025-05-02 PROCEDURE — 85025 COMPLETE CBC W/AUTO DIFF WBC: CPT

## 2025-05-09 ENCOUNTER — OFFICE VISIT (OUTPATIENT)
Dept: CARDIOLOGY | Facility: CLINIC | Age: 60
End: 2025-05-09
Payer: COMMERCIAL

## 2025-05-09 VITALS
WEIGHT: 171.94 LBS | SYSTOLIC BLOOD PRESSURE: 115 MMHG | RESPIRATION RATE: 15 BRPM | HEIGHT: 64 IN | HEART RATE: 66 BPM | OXYGEN SATURATION: 94 % | BODY MASS INDEX: 29.35 KG/M2 | DIASTOLIC BLOOD PRESSURE: 78 MMHG

## 2025-05-09 DIAGNOSIS — I50.9 CONGESTIVE HEART FAILURE, UNSPECIFIED HF CHRONICITY, UNSPECIFIED HEART FAILURE TYPE: Primary | ICD-10-CM

## 2025-05-09 DIAGNOSIS — R06.02 SOB (SHORTNESS OF BREATH): ICD-10-CM

## 2025-05-09 DIAGNOSIS — I82.402 LEG DVT (DEEP VENOUS THROMBOEMBOLISM), ACUTE, LEFT: ICD-10-CM

## 2025-05-09 DIAGNOSIS — R79.89 ELEVATED BRAIN NATRIURETIC PEPTIDE (BNP) LEVEL: ICD-10-CM

## 2025-05-09 DIAGNOSIS — G89.4 CHRONIC PAIN DISORDER: ICD-10-CM

## 2025-05-09 PROCEDURE — 99999 PR PBB SHADOW E&M-EST. PATIENT-LVL III: CPT | Mod: PBBFAC,,, | Performed by: INTERNAL MEDICINE

## 2025-05-09 RX ORDER — SEMAGLUTIDE 0.68 MG/ML
0.25 INJECTION, SOLUTION SUBCUTANEOUS
Qty: 1 EACH | Refills: 0 | Status: SHIPPED | OUTPATIENT
Start: 2025-05-09

## 2025-05-09 NOTE — PROGRESS NOTES
CARDIOVASCULAR CONSULTATION    REASON FOR CONSULT:   Myra Harrison is a 60 y.o. female who presents for   Chief Complaint   Patient presents with    Results     HISTORY OF PRESENT ILLNESS:     Patient is a pleasant 58-year-old lady.  Had knee surgery done last year.  After that DVT of left femoral vein.  Has had multiple ultrasounds done since then which have revealed partially occlusive DVT of left femoral vein, including 1 done recently at [facility].  She has musculoskeletal myalgias and bilateral leg pains which occur from the thighs all the way down to the feet.  No particular aggravating or relieving factors.  She has brought a she would from her primary care physician requesting a CTA angiogram of the legs to check for circulation.  She did have CT angiogram of her lungs performed in November which did not reveal any pulmonary embolism.  Denies orthopnea PND.  Does have chronic cough.      Impression:     1. No evidence of PE.  2. No acute intrathoracic abnormalities identified.  3. Stable 1.2 cm right lower lobe pulmonary nodule, requiring no follow-up as appears stable compared to remote CT from 2015 and likely reflecting benign etiology.    Impression:     Suspected ongoing nonocclusive thrombus in the left femoral vein.  No DVT in the right lower extremity.       Impression:     Unchanged appearance of nonocclusive thrombus within the proximal to mid aspect of the left femoral vein.        CONCLUSIONS     Normal left ventricular systolic function.     Calculated left ventricular ejection fraction by 2D tracking is 60 %.     Normal diastolic function.     There were no regional wall motion abnormalities.     Indeterminate right ventricular systolic function.       - TAPSE 12.4mm, S' 10.3cm/s.       - Normal Right ventricular systolic pressure 26.2 mmHg.     Normal atrial dimensions.     Normal cardiac valve structures.     Normal doppler study - trace MR, TR, TX only.    Findings:     Right  CFV: Patent   Right GSV: Patent   Right PFV: Patent   Right SFV Prox: Patent   Right SFV Mid: Patent   Right SFV Distal: Patent   Right Popliteal: Patent   Right Peroneal: Patent   Right PTV: Patent     Left CFV: Patent   Left GSV: Patent   Left PFV: Patent   Left SFV Prox: Partially Occluded   Left SFV Mid: Patent   Left SFV Distal: Patent   Left Popliteal: Patent   Left Peroneal: Patent   Left PTV: Patent      Notes from April 2024: Patient here for follow-up.  Denies any chest pains at rest on exertion, orthopnea, PND.  Has been doing fine.  Main complaint is chronic dyspnea on exertion which has been going on for many years      02/25/2021 Dobutamine Stress Test  Findings:   The left ventricle is normal in size.   Post stress SPECT images demonstrate normal perfusion in all regions. The rest images reveal no reversibility. Gated SPECT imaging demonstrates normal wall motion in all regions with a left ventricular ejection fraction estimated to be 68.00%.      Impression:  Stress ECG portion was negative for ischemia.   The overall study quality is good.   Normal perfusion scan with no evidence of inducible ischemia or areas of infarct      There is no prior study for comparison    November 21st 2024    History of Present Illness    CHIEF COMPLAINT:  Myra presents today for leg pain.    LEG PAIN:  She reports constant pain in both legs. The exact location within the legs and specific cause are not identified.    CHEST DISCOMFORT:  She experiences occasional chest pain, including a cramp during the EKG procedure. She denies constant chest pressure or pain related to specific activities such as walking or lying down.    CIRCULATION:  Recent venous test revealed a chronic clot in the superficial veins, which may be causing inflammation and pain. No other circulatory concerns were noted.    MEDICATIONS:  She continues adherence to prescribed blood thinners.    WEIGHT:  She reports being overweight.         Recent  ultrasound done in November 20, 2024 demonstrated no evidence of right lower extremity DVT, left common femoral vein was patent and left superficial femoral proximal vein at partial compressibility with chronic thrombus as well as left superficial femoral middle vein had partial compressible lip 80 with chronic thrombus    Arterial ultrasound showed no significant flow-limiting stenosis    Feb 25:    History of Present Illness    CHIEF COMPLAINT:  Myra presents today for fatigue, leg pain, and back pain.    CURRENT SYMPTOMS:  She reports fatigue with generalized weakness. She experiences pain in both legs and back. She reports chest pain occurring multiple times daily, varying in location from center to left side of chest, upper chest, and back with radiation under the arm. She experiences shortness of breath. She has a persistent cough despite taking allergy medications and Albuterol.    RECENT ER VISIT:  She reports an ER visit on Monday where BNP was elevated. She received oral medication and IV placement without medication administration.    FLUID RETENTION:  She reports fluid retention despite being on Triamterene, noting she is unable to wear her wedding ring.    MEDICAL HISTORY:  She has a history of blood clots  with confirmed involvement of the left leg.    CURRENT MEDICATIONS:  She takes eliquis 5 mg twice daily, triamterene hydrochlorothiazide, Albuterol, allergy medications, and Crestor. She reports taking Lisinopril, though dosage is unclear and medication is not on official medication list.         February 21, 2025    History of Present Illness    CHIEF COMPLAINT:  Myra presents today for follow up after an ER visit due to passing out while sitting in a chair    HISTORY OF PRESENT ILLNESS:  She experienced a syncopal episode while sitting in a chair at the ER, losing consciousness with no memory of the event. She denies any prior history of passing out. She reports intermittent dizziness upon  "standing, leg pain, numbness and tingling in fingertips, and cold feet with finger swelling. She noted blood on her pillow from ear bleeding yesterday morning.  On her last visit she was initiated on spironolactone and Entresto and feels much better.  Her swelling has gone away as well as her shortness of breath has improved significantly.  Her BNP has also normalized on this therapy    MEDICAL HISTORY:  She has history of congestive heart failure and DVT requiring lifelong anticoagulation therapy.    FAMILY HISTORY:  Father has diabetes.    CURRENT MEDICATIONS:  She takes metoprolol, rosuvastatin, Entresto, spironolactone, Eliquis, and Lasix as needed.       March 2025    History of Present Illness    CHIEF COMPLAINT:  Myra presents today for evaluation of chest pain and shortness of breath.    CARDIOVASCULAR SYMPTOMS:  She reports experiencing chest pain periodically for the past 1-2 months, occurring both at rest and with activity. She experiences palpitations described as a racing heart sensation multiple times daily. Associated symptoms include facial flushing and redness, describing her face feeling "on fire." No specific triggers for the chest pain episodes are identified. She has had four emergency department visits in the past month.    RESPIRATORY:  She reports dyspnea with exertion, limiting her walking distance. She becomes short of breath with household activities such as mopping and while walking at the mall.    MUSCULOSKELETAL:  She experiences burning sensation in both legs when walking and exercising. She reports charley horses in her calves at night that are painful to touch.    MEDICATIONS:  Current medications include Lasix 20mg daily, metoprolol 25mg daily, Crestor, spironolactone half pill, Eliquis, and Entresto. She reports heartburn with medications, though specific medication causing the symptom has not been identified.    SOCIAL HISTORY:  She is a never smoker but reports significant " "secondhand smoke exposure throughout her life due to both parents smoking during her childhood and beyond.         May 25:    History of Present Illness    CHIEF COMPLAINT:  Myra presents today for follow up of test results    DIAGNOSTIC STUDIES:  Stress test and echo were normal. Lower extremity arterial studies showed no evidence of stenosis. Holter monitor showed no arrhythmia.    MUSCULOSKELETAL:  She reports bilateral leg pain, described as hurting "all over" and in the back of the legs with muscle cramps and aileen horses occurring throughout both day and night that wake her from sleep. She reports difficulty walking due to pain. She has history of knee surgery on one side with recommendation for contralateral knee surgery, which she is hesitant to pursue due to concerns about blood clots.    MEDICAL HISTORY:  She has a history of blood clots in the LLE. Her mother  from breast cancer.    MEDICATIONS:  Current medications include Eliquis, Entresto, metoprolol, and rosuvastatin. She discontinued spironolactone due to concerns about breast cancer risk. She has Lasix available for as-needed use with swelling.         Results for orders placed or performed during the hospital encounter of 25   EKG 12-lead    Collection Time: 25 12:20 PM   Result Value Ref Range    QRS Duration 90 ms    [facility] QTC Calculation 411 ms    Narrative    Test Reason : I50.9,I82.402,G89.4,    Vent. Rate :  45 BPM     Atrial Rate :  45 BPM     P-R Int : 182 ms          QRS Dur :  90 ms      QT Int : 476 ms       P-R-T Axes :  59  73  67 degrees    QTcB Int : 411 ms    Sinus bradycardia  Otherwise normal ECG  When compared with ECG of 2025 13:55,  No significant change was found  Confirmed by Mary Wright ([id number]) on 2025 10:58:13 PM    Referred By: MARY WRIGHT           Confirmed By: Mary Wright       Results for orders placed during the hospital encounter of 25    Echo    Interpretation Summary    Left " Ventricle: The left ventricle is normal in size. Normal wall thickness. There is normal systolic function with a visually estimated ejection fraction of 55 - 60%.    Right Ventricle: Right ventricular enlargement. Systolic function is normal.    Left Atrium: Moderately dilated    Right Atrium: Right atrium is moderately dilated.    Mitral Valve: There is mild regurgitation.    Tricuspid Valve: There is mild regurgitation.    Pulmonary Artery: The estimated pulmonary artery systolic pressure is 26 mmHg.    IVC/SVC: Normal venous pressure at 3 mmHg.      Results for orders placed during the hospital encounter of 25    Nuclear Stress - Cardiology Interpreted    Interpretation Summary    Normal myocardial perfusion scan. There is no evidence of myocardial ischemia or infarction.    The gated perfusion images showed an ejection fraction of 68% at rest.    There is normal wall motion at rest and post-stress.    The ECG portion of the study is negative for ischemia.    The patient reported no chest pain during the stress test.    There were no arrhythmias during stress.      No results found for this or any previous visit.      Predominant Rhythm is sinus rhythm with heart rates varying between 43 and 114 BPM with an average of 68 BPM.    Supraventricular Arrhythmias: There were very rare PACs totalling 5 and averaging 0.05 per hour.    Ventricular Arrhythmias: There were very rare PVCs totalling 164 and averaging 1.71 per hour.         Multiphasic waveforms bilateral lower extremity without hemodynamically significant peripheral arterial disease.    PAST MEDICAL HISTORY:     Past Medical History:   Diagnosis Date    Asthma     Brain mass 2007    Depression     Fibromyalgia     Hypertension     Seizures     fell and hit head at grocery          PAST SURGICAL HISTORY:     Past Surgical History:   Procedure Laterality Date    CARPAL TUNNEL RELEASE      right     SECTION      x 3    cysto/ureteroscopy stent  placement      CYSTOSCOPY W/ URETERAL STENT PLACEMENT Left 7/20/2020    Procedure: CYSTOSCOPY, WITH URETERAL STENT INSERTION;  Surgeon: Yessi Granados MD;  Location: [facility] OR;  Service: Urology;  Laterality: Left;    HYSTERECTOMY      RETROGRADE PYELOGRAPHY N/A 7/20/2020    Procedure: PYELOGRAM, RETROGRADE;  Surgeon: Yessi Granados MD;  Location: [facility] OR;  Service: Urology;  Laterality: N/A;    TONSILLECTOMY      URETEROSCOPIC REMOVAL OF URETERIC CALCULUS Left 7/31/2020    Procedure: Cystoscopy, possible retrograde pyelogram, ureteroscopy with laser lithotripsy, placement of ureteral stent;  Surgeon: Yessi Granados MD;  Location: [facility] OR;  Service: Urology;  Laterality: Left;  RN PREOP 7/29/2020---COVID NEGATIVE ON 7/29           SOCIAL HISTORY:     Social History     Socioeconomic History    Marital status:    Tobacco Use    Smoking status: Never    Smokeless tobacco: Never   Substance and Sexual Activity    Alcohol use: Yes     Comment: occasionally    Drug use: No    Sexual activity: Yes     Social Drivers of Health     Financial Resource Strain: Low Risk  (2/24/2025)    Overall Financial Resource Strain (CARDIA)     Difficulty of Paying Living Expenses: Not hard at all   Food Insecurity: No Food Insecurity (2/24/2025)    Hunger Vital Sign     Worried About Running Out of Food in the Last Year: Never true     Ran Out of Food in the Last Year: Never true   Transportation Needs: No Transportation Needs (2/24/2025)    PRAPARE - Transportation     Lack of Transportation (Medical): No     Lack of Transportation (Non-Medical): No   Physical Activity: Sufficiently Active (2/24/2025)    Exercise Vital Sign     Days of Exercise per Week: 3 days     Minutes of Exercise per Session: 60 min   Stress: No Stress Concern Present (2/24/2025)    [employer] - Occupational Stress Questionnaire     Feeling of Stress : Not at all   Housing Stability: Low Risk  (2/24/2025)     "Housing Stability Vital Sign     Unable to Pay for Housing in the Last Year: No     Number of Times Moved in the Last Year: 0     Homeless in the Last Year: No       FAMILY HISTORY:     Family History   Problem Relation Name Age of Onset    Breast cancer Mother  60    Cancer Mother          Breast cancer    Heart disease Mother      Diabetes Father      Cancer Maternal Grandmother          Lung Cancer    Cancer Maternal Grandfather          Lung Cancer    Cancer Paternal Grandmother      Cancer Paternal Grandfather         REVIEW OF SYSTEMS:   Review of Systems   Constitutional: Negative.   HENT: Negative.     Eyes: Negative.    Endocrine: Negative.    Hematologic/Lymphatic: Negative.    Skin: Negative.    Musculoskeletal:  Positive for joint pain and myalgias.   Gastrointestinal: Negative.    Genitourinary: Negative.    Neurological: Negative.    Psychiatric/Behavioral: Negative.     Allergic/Immunologic: Negative.        A 10 point review of systems was performed and all the pertinent positives have been mentioned. Rest of review of systems was negative.        PHYSICAL EXAM:     Vitals:    05/09/25 1017   BP: 115/78   Pulse: 66   Resp: 15    Body mass index is 29.52 kg/m².  Weight: 78 kg (171 lb 15.3 oz)   Height: 5' 4" (162.6 cm)     Physical Exam  Constitutional:       Appearance: Normal appearance. She is well-developed.   HENT:      Head: Normocephalic.   Eyes:      Pupils: Pupils are equal, round, and reactive to light.   Cardiovascular:      Rate and Rhythm: Normal rate and regular rhythm.   Pulmonary:      Effort: Pulmonary effort is normal.      Breath sounds: Normal breath sounds.   Abdominal:      General: Bowel sounds are normal.      Palpations: Abdomen is soft.      Tenderness: There is no abdominal tenderness.   Musculoskeletal:         General: Normal range of motion.      Cervical back: Normal range of motion and neck supple.   Skin:     General: Skin is warm.   Neurological:      Mental Status: " She is alert and oriented to person, place, and time.           DATA:     Laboratory:  CBC:  Recent Labs   Lab 02/03/25  1622 02/20/25  1448 05/02/25  0918   WBC 8.27 7.21 5.98   Hemoglobin 14.2 14.7  --    HGB  --   --  13.9   Hematocrit 44.0 43.7  --    HCT  --   --  44.4   Platelet Count  --   --  213   Platelets 252 266  --        CHEMISTRIES:  Recent Labs   Lab 02/03/25  1622 02/12/25  0957 02/20/25  1448   Glucose 88 98 96   Sodium 139 142 142   Potassium 4.0 4.1 3.8   BUN 15 17 20   Creatinine 0.8 1.0 1.1   Calcium 9.5 9.6 9.8       CARDIAC BIOMARKERS:  Recent Labs   Lab 11/15/23  1657 12/29/23  1128 02/01/24  1244 02/03/25  1623 02/20/25  1448   CPK  --   --  47 43  --    Troponin I <0.006 <0.006  --   --  <0.006       COAGS:  Recent Labs   Lab 02/01/24  1244 02/03/25  1622   INR 1.0 1.0       LIPIDS/LFTS:  Recent Labs   Lab 02/01/24  1244 02/03/25  1622 02/20/25  1448   AST 21 13 14   ALT 19 11 14       Hemoglobin A1C   Date Value Ref Range Status   02/27/2025 5.2 4.0 - 5.6 % Final     Comment:     ADA Screening Guidelines:  5.7-6.4%  Consistent with prediabetes  >or=6.5%  Consistent with diabetes    High levels of fetal hemoglobin interfere with the HbA1C  assay. Heterozygous hemoglobin variants (HbS, HgC, etc)do  not significantly interfere with this assay.   However, presence of multiple variants may affect accuracy.         TSH        The ASCVD Risk score (Camryn DK, et al., 2019) failed to calculate for the following reasons:    Cannot find a previous HDL lab    Cannot find a previous total cholesterol lab       BNP    Lab Results   Component Value Date/Time    BNP 36 02/20/2025 02:48 PM     (H) 02/03/2025 04:22 PM    BNP 19 12/29/2023 11:28 AM    BNP 19 11/15/2023 04:57 PM            ECHO    Results for orders placed during the hospital encounter of 07/19/20    Echo Color Flow Doppler? Yes    Interpretation Summary  · Normal left ventricular systolic function. The estimated ejection fraction is  55%.  · Normal LV diastolic function.  · Normal right ventricular systolic function.  · No pulmonary hypertension present.      STRESS TEST    No results found for this or any previous visit.        CATH    No results found for this or any previous visit.              ASSESSMENT AND PLAN     Patient Active Problem List   Diagnosis    Nephrolithiasis    Acute post-traumatic headache, not intractable    Chronic pain disorder    Fibromyalgia    Bacteremia due to Escherichia coli    E. coli UTI    Possible COVID-19 Infection    Essential hypertension    Seizure disorder    Asthma    MDD (major depressive disorder)    Ureteral stone    Left ureteral calculus    Leg DVT (deep venous thromboembolism), acute, left    Anticoagulated    Elevated brain natriuretic peptide (BNP) level    Congestive heart failure         ALLERGIES AND MEDICATION:     Review of patient's allergies indicates:   Allergen Reactions    Penicillins Itching    Codeine Itching        Medication List            Accurate as of May 9, 2025 10:54 AM. If you have any questions, ask your nurse or doctor.                START taking these medications      OZEMPIC 0.25 mg or 0.5 mg (2 mg/3 mL) pen injector  Generic drug: semaglutide  Inject 0.25 mg into the skin every 7 days.  Started by: Candelario Chauhan MD            CONTINUE taking these medications      albuterol sulfate 2.5 mg/0.5 mL Nebu  Take 2.5 mg by nebulization every 4 (four) hours as needed (wheeze). Rescue     ELIQUIS 5 mg Tab  Generic drug: apixaban  Take 1 tablet (5 mg total) by mouth 2 (two) times daily.     ENTRESTO 24-26 mg per tablet  Generic drug: sacubitriL-valsartan  Take 1 tablet by mouth 2 (two) times daily.     ergocalciferol 50,000 unit Cap  Commonly known as: ERGOCALCIFEROL     fluticasone-salmeterol 250-50 mcg/dose 250-50 mcg/dose diskus inhaler  Commonly known as: ADVAIR     furosemide 20 MG tablet  Commonly known as: LASIX  Take 1 tablet (20 mg total) by mouth as needed (1-2 pills as  needed for fluid overload).     metoprolol succinate 25 MG 24 hr tablet  Commonly known as: TOPROL-XL  Take 1 tablet (25 mg total) by mouth once daily.     neomycin-polymyxin-hydrocortisone 3.5-10,000-1 mg/mL-unit/mL-% otic suspension  Commonly known as: CORTISPORIN  Place 3 drops into the left ear 3 (three) times daily.     ondansetron 4 MG tablet  Commonly known as: ZOFRAN  Take 1 tablet (4 mg total) by mouth every 8 (eight) hours as needed.     ondansetron 4 MG Tbdl  Commonly known as: ZOFRAN-ODT  Take 1 tablet (4 mg total) by mouth every 6 (six) hours as needed.     pantoprazole 40 MG tablet  Commonly known as: PROTONIX     promethazine 12.5 MG Tab  Commonly known as: PHENERGAN     rosuvastatin 10 MG tablet  Commonly known as: CRESTOR     sertraline 50 MG tablet  Commonly known as: ZOLOFT     UNABLE TO FIND               Where to Get Your Medications        These medications were sent to [facility]/pharmacy #[id number] - [place] - [address]  [address] [place] [address]      Phone: [phone]   OZEMPIC 0.25 mg or 0.5 mg (2 mg/3 mL) pen injector         No orders of the defined types were placed in this encounter.    Assessment & Plan    IMPRESSION:  Reviewed test results: stress test, echocardiogram, leg artery assessment, and Holter monitor - all normal with no significant findings.  Neuropathy or post-thrombotic syndrome considered as potential causes for bilateral leg pain, given normal circulation.    PLAN SUMMARY:  - Continue Eliquis, metoprolol, rosuvastatin, and Entresto  - patient Discontinued spironolactone after reading side effects.  Did not have any side effects herself  - Start Ozempic at patient request for heart failure with preserved ejection fraction  - Use Lasix as needed for swelling  - Consult primary care physician regarding bilateral leg pain  - Follow up in 1 month to assess Ozempic tolerance    Heart failure with preserved ejection fraction.  - Discontinued  spironolactone due to patient concerns.  - Clarified that Lasix is a fluid pill to be used as needed for swelling.  - Continued Lasix as needed for swelling.  - Continued Eliquis.  - Continued Entresto.  - Continued metoprolol.    WEIGHT MANAGEMENT:  - Started Ozempic, pending insurance approval.    FOLLOW-UP:  - Follow up in 1 month to assess Ozempic tolerance.      ANTICOAGULATION MANAGEMENT for DVT:  - Continued Eliquis.    HYPERLIPIDEMIA:  - Continued Crestor.     DVT: Left lower extremity nonocclusive femoral vein DVT.    On Eliquis already.  Continue Eliquis.  Heme-Onc determined lifelong anticoagulation      FOLLOW-UP:  - Follow up after completing diagnostic tests.       This note was generated with the assistance of ambient listening technology. Verbal consent was obtained by the patient and accompanying visitor(s) for the recording of patient appointment to facilitate this note. I attest to having reviewed and edited the generated note for accuracy, though some syntax or spelling errors may persist. Please contact the author of this note for any clarification.      Thank you very much for involving me in the care of your patient.  Please do not hesitate to contact me if there are any questions.      Candelario Chauhan MD, FACC, [facility]  Interventional Cardiologist, [facility].       Visit today included increased complexity associated with the care of the episodic problem dvt, chronic anticoagulation, ELEVATED BNP AND NEW ONSET CONGESTIVE HEART FAILURE addressed and managing the longitudinal care of the patient due to the serious and/or complex managed problem(s)   Patient Active Problem List   Diagnosis    Nephrolithiasis    Acute post-traumatic headache, not intractable    Chronic pain disorder    Fibromyalgia    Bacteremia due to Escherichia coli    E. coli UTI    Possible COVID-19 Infection    Essential hypertension    Seizure disorder    Asthma    MDD (major depressive disorder)    Ureteral stone     Left ureteral calculus    Leg DVT (deep venous thromboembolism), acute, left    Anticoagulated    Elevated brain natriuretic peptide (BNP) level    Congestive heart failure     .      This note was dictated with the help of speech recognition software.  There might be un-intended errors and/or substitutions.

## 2025-05-15 ENCOUNTER — HOSPITAL ENCOUNTER (OUTPATIENT)
Dept: RADIOLOGY | Facility: HOSPITAL | Age: 60
Discharge: HOME | End: 2025-05-15
Attending: INTERNAL MEDICINE
Payer: COMMERCIAL

## 2025-05-15 DIAGNOSIS — G89.29 OTHER CHRONIC PAIN: ICD-10-CM

## 2025-05-15 DIAGNOSIS — R05.3 CHRONIC COUGH: ICD-10-CM

## 2025-05-15 DIAGNOSIS — M25.511 PAIN IN RIGHT SHOULDER: ICD-10-CM

## 2025-05-15 PROCEDURE — 73030 X-RAY EXAM OF SHOULDER: CPT | Mod: TC,FY,RT

## 2025-05-15 PROCEDURE — 71046 X-RAY EXAM CHEST 2 VIEWS: CPT | Mod: TC,FY

## 2025-05-15 PROCEDURE — 71046 X-RAY EXAM CHEST 2 VIEWS: CPT | Mod: 26,,, | Performed by: RADIOLOGY

## 2025-06-17 ENCOUNTER — OFFICE VISIT (OUTPATIENT)
Dept: CARDIOLOGY | Facility: CLINIC | Age: 60
End: 2025-06-17
Payer: COMMERCIAL

## 2025-06-17 VITALS
SYSTOLIC BLOOD PRESSURE: 136 MMHG | RESPIRATION RATE: 15 BRPM | WEIGHT: 177.38 LBS | OXYGEN SATURATION: 95 % | BODY MASS INDEX: 30.28 KG/M2 | DIASTOLIC BLOOD PRESSURE: 81 MMHG | HEART RATE: 72 BPM | HEIGHT: 64 IN

## 2025-06-17 DIAGNOSIS — I82.402 LEG DVT (DEEP VENOUS THROMBOEMBOLISM), ACUTE, LEFT: ICD-10-CM

## 2025-06-17 DIAGNOSIS — I50.9 CONGESTIVE HEART FAILURE, UNSPECIFIED HF CHRONICITY, UNSPECIFIED HEART FAILURE TYPE: Primary | ICD-10-CM

## 2025-06-17 DIAGNOSIS — I82.4Y9 DEEP VEIN THROMBOSIS (DVT) OF PROXIMAL LOWER EXTREMITY, UNSPECIFIED CHRONICITY, UNSPECIFIED LATERALITY: ICD-10-CM

## 2025-06-17 DIAGNOSIS — R06.02 SOB (SHORTNESS OF BREATH): ICD-10-CM

## 2025-06-17 PROCEDURE — 3075F SYST BP GE 130 - 139MM HG: CPT | Mod: CPTII,S$GLB,, | Performed by: INTERNAL MEDICINE

## 2025-06-17 PROCEDURE — G2211 COMPLEX E/M VISIT ADD ON: HCPCS | Mod: S$GLB,,, | Performed by: INTERNAL MEDICINE

## 2025-06-17 PROCEDURE — 3079F DIAST BP 80-89 MM HG: CPT | Mod: CPTII,S$GLB,, | Performed by: INTERNAL MEDICINE

## 2025-06-17 PROCEDURE — 99999 PR PBB SHADOW E&M-EST. PATIENT-LVL V: CPT | Mod: PBBFAC,,, | Performed by: INTERNAL MEDICINE

## 2025-06-17 PROCEDURE — 1159F MED LIST DOCD IN RCRD: CPT | Mod: CPTII,S$GLB,, | Performed by: INTERNAL MEDICINE

## 2025-06-17 PROCEDURE — 99214 OFFICE O/P EST MOD 30 MIN: CPT | Mod: S$GLB,,, | Performed by: INTERNAL MEDICINE

## 2025-06-17 PROCEDURE — 4010F ACE/ARB THERAPY RXD/TAKEN: CPT | Mod: CPTII,S$GLB,, | Performed by: INTERNAL MEDICINE

## 2025-06-17 PROCEDURE — 3008F BODY MASS INDEX DOCD: CPT | Mod: CPTII,S$GLB,, | Performed by: INTERNAL MEDICINE

## 2025-06-17 PROCEDURE — 3044F HG A1C LEVEL LT 7.0%: CPT | Mod: CPTII,S$GLB,, | Performed by: INTERNAL MEDICINE

## 2025-06-17 RX ORDER — SPIRONOLACTONE 25 MG/1
25 TABLET ORAL DAILY
Qty: 90 TABLET | Refills: 3 | Status: SHIPPED | OUTPATIENT
Start: 2025-06-17

## 2025-06-17 RX ORDER — FUROSEMIDE 20 MG/1
20 TABLET ORAL
Qty: 90 TABLET | Refills: 3 | Status: SHIPPED | OUTPATIENT
Start: 2025-06-17

## 2025-06-17 RX ORDER — SEMAGLUTIDE 0.25 MG/.5ML
0.25 INJECTION, SOLUTION SUBCUTANEOUS
Qty: 3 ML | Refills: 1 | Status: SHIPPED | OUTPATIENT
Start: 2025-06-17

## 2025-06-17 NOTE — PROGRESS NOTES
CARDIOVASCULAR CONSULTATION    REASON FOR CONSULT:   Myra Harrison is a 60 y.o. female who presents for   Chief Complaint   Patient presents with    Follow-up     HISTORY OF PRESENT ILLNESS:     Patient is a pleasant 58-year-old lady.  Had knee surgery done last year.  After that DVT of left femoral vein.  Has had multiple ultrasounds done since then which have revealed partially occlusive DVT of left femoral vein, including 1 done recently at [facility].  She has musculoskeletal myalgias and bilateral leg pains which occur from the thighs all the way down to the feet.  No particular aggravating or relieving factors.  She has brought a she would from her primary care physician requesting a CTA angiogram of the legs to check for circulation.  She did have CT angiogram of her lungs performed in November which did not reveal any pulmonary embolism.  Denies orthopnea PND.  Does have chronic cough.      Impression:     1. No evidence of PE.  2. No acute intrathoracic abnormalities identified.  3. Stable 1.2 cm right lower lobe pulmonary nodule, requiring no follow-up as appears stable compared to remote CT from 2015 and likely reflecting benign etiology.    Impression:     Suspected ongoing nonocclusive thrombus in the left femoral vein.  No DVT in the right lower extremity.       Impression:     Unchanged appearance of nonocclusive thrombus within the proximal to mid aspect of the left femoral vein.        CONCLUSIONS     Normal left ventricular systolic function.     Calculated left ventricular ejection fraction by 2D tracking is 60 %.     Normal diastolic function.     There were no regional wall motion abnormalities.     Indeterminate right ventricular systolic function.       - TAPSE 12.4mm, S' 10.3cm/s.       - Normal Right ventricular systolic pressure 26.2 mmHg.     Normal atrial dimensions.     Normal cardiac valve structures.     Normal doppler study - trace MR, TR, PA only.    Findings:     Right  CFV: Patent   Right GSV: Patent   Right PFV: Patent   Right SFV Prox: Patent   Right SFV Mid: Patent   Right SFV Distal: Patent   Right Popliteal: Patent   Right Peroneal: Patent   Right PTV: Patent     Left CFV: Patent   Left GSV: Patent   Left PFV: Patent   Left SFV Prox: Partially Occluded   Left SFV Mid: Patent   Left SFV Distal: Patent   Left Popliteal: Patent   Left Peroneal: Patent   Left PTV: Patent      Notes from April 2024: Patient here for follow-up.  Denies any chest pains at rest on exertion, orthopnea, PND.  Has been doing fine.  Main complaint is chronic dyspnea on exertion which has been going on for many years      02/25/2021 Dobutamine Stress Test  Findings:   The left ventricle is normal in size.   Post stress SPECT images demonstrate normal perfusion in all regions. The rest images reveal no reversibility. Gated SPECT imaging demonstrates normal wall motion in all regions with a left ventricular ejection fraction estimated to be 68.00%.      Impression:  Stress ECG portion was negative for ischemia.   The overall study quality is good.   Normal perfusion scan with no evidence of inducible ischemia or areas of infarct      There is no prior study for comparison    November 21st 2024    History of Present Illness    CHIEF COMPLAINT:  Myra presents today for leg pain.    LEG PAIN:  She reports constant pain in both legs. The exact location within the legs and specific cause are not identified.    CHEST DISCOMFORT:  She experiences occasional chest pain, including a cramp during the EKG procedure. She denies constant chest pressure or pain related to specific activities such as walking or lying down.    CIRCULATION:  Recent venous test revealed a chronic clot in the superficial veins, which may be causing inflammation and pain. No other circulatory concerns were noted.    MEDICATIONS:  She continues adherence to prescribed blood thinners.    WEIGHT:  She reports being overweight.         Recent  ultrasound done in November 20, 2024 demonstrated no evidence of right lower extremity DVT, left common femoral vein was patent and left superficial femoral proximal vein at partial compressibility with chronic thrombus as well as left superficial femoral middle vein had partial compressible lip 80 with chronic thrombus    Arterial ultrasound showed no significant flow-limiting stenosis    Feb 25:    History of Present Illness    CHIEF COMPLAINT:  Myra presents today for fatigue, leg pain, and back pain.    CURRENT SYMPTOMS:  She reports fatigue with generalized weakness. She experiences pain in both legs and back. She reports chest pain occurring multiple times daily, varying in location from center to left side of chest, upper chest, and back with radiation under the arm. She experiences shortness of breath. She has a persistent cough despite taking allergy medications and Albuterol.    RECENT ER VISIT:  She reports an ER visit on Monday where BNP was elevated. She received oral medication and IV placement without medication administration.    FLUID RETENTION:  She reports fluid retention despite being on Triamterene, noting she is unable to wear her wedding ring.    MEDICAL HISTORY:  She has a history of blood clots  with confirmed involvement of the left leg.    CURRENT MEDICATIONS:  She takes eliquis 5 mg twice daily, triamterene hydrochlorothiazide, Albuterol, allergy medications, and Crestor. She reports taking Lisinopril, though dosage is unclear and medication is not on official medication list.         February 21, 2025    History of Present Illness    CHIEF COMPLAINT:  Myra presents today for follow up after an ER visit due to passing out while sitting in a chair    HISTORY OF PRESENT ILLNESS:  She experienced a syncopal episode while sitting in a chair at the ER, losing consciousness with no memory of the event. She denies any prior history of passing out. She reports intermittent dizziness upon  "standing, leg pain, numbness and tingling in fingertips, and cold feet with finger swelling. She noted blood on her pillow from ear bleeding yesterday morning.  On her last visit she was initiated on spironolactone and Entresto and feels much better.  Her swelling has gone away as well as her shortness of breath has improved significantly.  Her BNP has also normalized on this therapy    MEDICAL HISTORY:  She has history of congestive heart failure and DVT requiring lifelong anticoagulation therapy.    FAMILY HISTORY:  Father has diabetes.    CURRENT MEDICATIONS:  She takes metoprolol, rosuvastatin, Entresto, spironolactone, Eliquis, and Lasix as needed.       March 2025    History of Present Illness    CHIEF COMPLAINT:  Myra presents today for evaluation of chest pain and shortness of breath.    CARDIOVASCULAR SYMPTOMS:  She reports experiencing chest pain periodically for the past 1-2 months, occurring both at rest and with activity. She experiences palpitations described as a racing heart sensation multiple times daily. Associated symptoms include facial flushing and redness, describing her face feeling "on fire." No specific triggers for the chest pain episodes are identified. She has had four emergency department visits in the past month.    RESPIRATORY:  She reports dyspnea with exertion, limiting her walking distance. She becomes short of breath with household activities such as mopping and while walking at the mall.    MUSCULOSKELETAL:  She experiences burning sensation in both legs when walking and exercising. She reports charley horses in her calves at night that are painful to touch.    MEDICATIONS:  Current medications include Lasix 20mg daily, metoprolol 25mg daily, Crestor, spironolactone half pill, Eliquis, and Entresto. She reports heartburn with medications, though specific medication causing the symptom has not been identified.    SOCIAL HISTORY:  She is a never smoker but reports significant " "secondhand smoke exposure throughout her life due to both parents smoking during her childhood and beyond.         May 25:    History of Present Illness    CHIEF COMPLAINT:  Myra presents today for follow up of test results    DIAGNOSTIC STUDIES:  Stress test and echo were normal. Lower extremity arterial studies showed no evidence of stenosis. Holter monitor showed no arrhythmia.    MUSCULOSKELETAL:  She reports bilateral leg pain, described as hurting "all over" and in the back of the legs with muscle cramps and aileen horses occurring throughout both day and night that wake her from sleep. She reports difficulty walking due to pain. She has history of knee surgery on one side with recommendation for contralateral knee surgery, which she is hesitant to pursue due to concerns about blood clots.    MEDICAL HISTORY:  She has a history of blood clots in the LLE. Her mother  from breast cancer.    MEDICATIONS:  Current medications include Eliquis, Entresto, metoprolol, and rosuvastatin. She discontinued spironolactone due to concerns about breast cancer risk. She has Lasix available for as-needed use with swelling.         Results for orders placed or performed during the hospital encounter of 25   EKG 12-lead    Collection Time: 25 12:20 PM   Result Value Ref Range    QRS Duration 90 ms    [facility] QTC Calculation 411 ms    Narrative    Test Reason : I50.9,I82.402,G89.4,    Vent. Rate :  45 BPM     Atrial Rate :  45 BPM     P-R Int : 182 ms          QRS Dur :  90 ms      QT Int : 476 ms       P-R-T Axes :  59  73  67 degrees    QTcB Int : 411 ms    Sinus bradycardia  Otherwise normal ECG  When compared with ECG of 2025 13:55,  No significant change was found  Confirmed by Mary Wright ([id number]) on 2025 10:58:13 PM    Referred By: MARY WRIGHT           Confirmed By: Mary Wright       Results for orders placed during the hospital encounter of 25    Echo    Interpretation Summary    Left " Ventricle: The left ventricle is normal in size. Normal wall thickness. There is normal systolic function with a visually estimated ejection fraction of 55 - 60%.    Right Ventricle: Right ventricular enlargement. Systolic function is normal.    Left Atrium: Moderately dilated    Right Atrium: Right atrium is moderately dilated.    Mitral Valve: There is mild regurgitation.    Tricuspid Valve: There is mild regurgitation.    Pulmonary Artery: The estimated pulmonary artery systolic pressure is 26 mmHg.    IVC/SVC: Normal venous pressure at 3 mmHg.      Results for orders placed during the hospital encounter of 05/01/25    Nuclear Stress - Cardiology Interpreted    Interpretation Summary    Normal myocardial perfusion scan. There is no evidence of myocardial ischemia or infarction.    The gated perfusion images showed an ejection fraction of 68% at rest.    There is normal wall motion at rest and post-stress.    The ECG portion of the study is negative for ischemia.    The patient reported no chest pain during the stress test.    There were no arrhythmias during stress.      No results found for this or any previous visit.      Predominant Rhythm is sinus rhythm with heart rates varying between 43 and 114 BPM with an average of 68 BPM.    Supraventricular Arrhythmias: There were very rare PACs totalling 5 and averaging 0.05 per hour.    Ventricular Arrhythmias: There were very rare PVCs totalling 164 and averaging 1.71 per hour.         Multiphasic waveforms bilateral lower extremity without hemodynamically significant peripheral arterial disease.      Notes from June 25:    History of Present Illness    CHIEF COMPLAINT:  Myra presents today for follow up of cardiac condition.    CARDIOVASCULAR:  She experienced dyspnea while taking Entresto, particularly when lying down, with sensation of fluid in chest. Symptoms were alleviated by elevating head with pillows. She stopped Entresto for 1 week  with some  improvement in symptoms.  Has not restarted Entresto.    MEDICATIONS:  She discontinued spironolactone due to concerns about breast cancer risk given family history. She reports bruising while on Eliquis. She has not started Ozempic due to pharmacy and insurance coverage issues.      WEIGHT MANAGEMENT:  She reports current weight of 177 lbs, a 10-pound increase since last visit. She has implemented dietary modifications and resumed walking for exercise.    MEDICAL HISTORY:  She has history of asthma and COPD.    FAMILY HISTORY:  Mother  from breast cancer.         PAST MEDICAL HISTORY:     Past Medical History:   Diagnosis Date    Asthma     Brain mass     Depression     Fibromyalgia     Hypertension     Seizures 2007    fell and hit head at grocery          PAST SURGICAL HISTORY:     Past Surgical History:   Procedure Laterality Date    CARPAL TUNNEL RELEASE      right     SECTION      x 3    cysto/ureteroscopy stent placement      CYSTOSCOPY W/ URETERAL STENT PLACEMENT Left 2020    Procedure: CYSTOSCOPY, WITH URETERAL STENT INSERTION;  Surgeon: Yessi Granados MD;  Location: [facility] OR;  Service: Urology;  Laterality: Left;    HYSTERECTOMY      RETROGRADE PYELOGRAPHY N/A 2020    Procedure: PYELOGRAM, RETROGRADE;  Surgeon: Yessi Granados MD;  Location: [facility] OR;  Service: Urology;  Laterality: N/A;    TONSILLECTOMY      URETEROSCOPIC REMOVAL OF URETERIC CALCULUS Left 2020    Procedure: Cystoscopy, possible retrograde pyelogram, ureteroscopy with laser lithotripsy, placement of ureteral stent;  Surgeon: Yessi Granados MD;  Location: [facility] OR;  Service: Urology;  Laterality: Left;  RN PREOP 2020---COVID NEGATIVE ON            SOCIAL HISTORY:     Social History     Socioeconomic History    Marital status:    Tobacco Use    Smoking status: Never    Smokeless tobacco: Never   Substance and Sexual Activity    Alcohol use: Yes     Comment: occasionally     Drug use: No    Sexual activity: Yes     Social Drivers of Health     Financial Resource Strain: Low Risk  (2/24/2025)    Overall Financial Resource Strain (CARDIA)     Difficulty of Paying Living Expenses: Not hard at all   Food Insecurity: No Food Insecurity (2/24/2025)    Hunger Vital Sign     Worried About Running Out of Food in the Last Year: Never true     Ran Out of Food in the Last Year: Never true   Transportation Needs: No Transportation Needs (2/24/2025)    PRAPARE - Transportation     Lack of Transportation (Medical): No     Lack of Transportation (Non-Medical): No   Physical Activity: Sufficiently Active (2/24/2025)    Exercise Vital Sign     Days of Exercise per Week: 3 days     Minutes of Exercise per Session: 60 min   Stress: No Stress Concern Present (2/24/2025)    [employer] - Occupational Stress Questionnaire     Feeling of Stress : Not at all   Housing Stability: Low Risk  (2/24/2025)    Housing Stability Vital Sign     Unable to Pay for Housing in the Last Year: No     Number of Times Moved in the Last Year: 0     Homeless in the Last Year: No       FAMILY HISTORY:     Family History   Problem Relation Name Age of Onset    Breast cancer Mother  60    Cancer Mother          Breast cancer    Heart disease Mother      Diabetes Father      Cancer Maternal Grandmother          Lung Cancer    Cancer Maternal Grandfather          Lung Cancer    Cancer Paternal Grandmother      Cancer Paternal Grandfather         REVIEW OF SYSTEMS:   Review of Systems   Constitutional: Negative.   HENT: Negative.     Eyes: Negative.    Endocrine: Negative.    Hematologic/Lymphatic: Negative.    Skin: Negative.    Musculoskeletal:  Positive for joint pain and myalgias.   Gastrointestinal: Negative.    Genitourinary: Negative.    Neurological: Negative.    Psychiatric/Behavioral: Negative.     Allergic/Immunologic: Negative.        A 10 point review of systems was performed and all the  "pertinent positives have been mentioned. Rest of review of systems was negative.        PHYSICAL EXAM:     Vitals:    06/17/25 1130   BP: 136/81   Pulse: 72   Resp: 15    Body mass index is 30.44 kg/m².  Weight: 80.4 kg (177 lb 5.8 oz)   Height: 5' 4" (162.6 cm)     Physical Exam  Constitutional:       Appearance: Normal appearance. She is well-developed.   HENT:      Head: Normocephalic.   Eyes:      Pupils: Pupils are equal, round, and reactive to light.   Cardiovascular:      Rate and Rhythm: Normal rate and regular rhythm.   Pulmonary:      Effort: Pulmonary effort is normal.      Breath sounds: Normal breath sounds.   Abdominal:      General: Bowel sounds are normal.      Palpations: Abdomen is soft.      Tenderness: There is no abdominal tenderness.   Musculoskeletal:         General: Normal range of motion.      Cervical back: Normal range of motion and neck supple.   Skin:     General: Skin is warm.   Neurological:      Mental Status: She is alert and oriented to person, place, and time.           DATA:     Laboratory:  CBC:  Recent Labs   Lab 02/03/25  1622 02/20/25  1448 05/02/25  0918   WBC 8.27 7.21 5.98   Hemoglobin 14.2 14.7  --    HGB  --   --  13.9   Hematocrit 44.0 43.7  --    HCT  --   --  44.4   Platelet Count  --   --  213   Platelets 252 266  --        CHEMISTRIES:  Recent Labs   Lab 02/03/25  1622 02/12/25  0957 02/20/25  1448   Glucose 88 98 96   Sodium 139 142 142   Potassium 4.0 4.1 3.8   BUN 15 17 20   Creatinine 0.8 1.0 1.1   Calcium 9.5 9.6 9.8       CARDIAC BIOMARKERS:  Recent Labs   Lab 11/15/23  1657 12/29/23  1128 02/01/24  1244 02/03/25  1623 02/20/25  1448   CPK  --   --  47 43  --    Troponin I <0.006 <0.006  --   --  <0.006       COAGS:  Recent Labs   Lab 02/01/24  1244 02/03/25  1622   INR 1.0 1.0       LIPIDS/LFTS:  Recent Labs   Lab 02/01/24  1244 02/03/25  1622 02/20/25  1448   AST 21 13 14   ALT 19 11 14       Hemoglobin A1C   Date Value Ref Range Status   02/27/2025 5.2 4.0 " - 5.6 % Final     Comment:     ADA Screening Guidelines:  5.7-6.4%  Consistent with prediabetes  >or=6.5%  Consistent with diabetes    High levels of fetal hemoglobin interfere with the HbA1C  assay. Heterozygous hemoglobin variants (HbS, HgC, etc)do  not significantly interfere with this assay.   However, presence of multiple variants may affect accuracy.         TSH        The ASCVD Risk score (Camryn DK, et al., 2019) failed to calculate for the following reasons:    Cannot find a previous HDL lab    Cannot find a previous total cholesterol lab       BNP    Lab Results   Component Value Date/Time    BNP 36 02/20/2025 02:48 PM     (H) 02/03/2025 04:22 PM    BNP 19 12/29/2023 11:28 AM    BNP 19 11/15/2023 04:57 PM            ECHO    Results for orders placed during the hospital encounter of 07/19/20    Echo Color Flow Doppler? Yes    Interpretation Summary  · Normal left ventricular systolic function. The estimated ejection fraction is 55%.  · Normal LV diastolic function.  · Normal right ventricular systolic function.  · No pulmonary hypertension present.      STRESS TEST    No results found for this or any previous visit.        CATH    No results found for this or any previous visit.              ASSESSMENT AND PLAN     Patient Active Problem List   Diagnosis    Nephrolithiasis    Acute post-traumatic headache, not intractable    Chronic pain disorder    Fibromyalgia    Bacteremia due to Escherichia coli    E. coli UTI    Possible COVID-19 Infection    Essential hypertension    Seizure disorder    Asthma    MDD (major depressive disorder)    Ureteral stone    Left ureteral calculus    Leg DVT (deep venous thromboembolism), acute, left    Anticoagulated    Elevated brain natriuretic peptide (BNP) level    Congestive heart failure         ALLERGIES AND MEDICATION:     Review of patient's allergies indicates:   Allergen Reactions    Penicillins Itching    Codeine Itching        Medication List             Accurate as of June 17, 2025  3:40 PM. If you have any questions, ask your nurse or doctor.                START taking these medications      spironolactone 25 MG tablet  Commonly known as: ALDACTONE  Take 1 tablet (25 mg total) by mouth once daily.  Started by: Candelario Chauhan MD     WEGOVY 0.25 mg/0.5 mL Pnij  Generic drug: semaglutide (weight loss)  Inject 0.25 mg into the skin every 7 days.  Replaces: OZEMPIC 0.25 mg or 0.5 mg (2 mg/3 mL) pen injector  Started by: Candelario Chauhan MD            CHANGE how you take these medications      * furosemide 20 MG tablet  Commonly known as: LASIX  Take 1 tablet (20 mg total) by mouth as needed.  What changed: You were already taking a medication with the same name, and this prescription was added. Make sure you understand how and when to take each.  Changed by: Candelario Chauhan MD     * furosemide 20 MG tablet  Commonly known as: LASIX  Take 1 tablet (20 mg total) by mouth as needed (1-2 pills as needed for fluid overload).  What changed: Another medication with the same name was added. Make sure you understand how and when to take each.  Changed by: Candelario Chauhan MD           * This list has 2 medication(s) that are the same as other medications prescribed for you. Read the directions carefully, and ask your doctor or other care provider to review them with you.                CONTINUE taking these medications      albuterol sulfate 2.5 mg/0.5 mL Nebu  Take 2.5 mg by nebulization every 4 (four) hours as needed (wheeze). Rescue     ELIQUIS 5 mg Tab  Generic drug: apixaban  Take 1 tablet (5 mg total) by mouth 2 (two) times daily.     ergocalciferol 50,000 unit Cap  Commonly known as: ERGOCALCIFEROL     fluticasone-salmeterol 250-50 mcg/dose 250-50 mcg/dose diskus inhaler  Commonly known as: ADVAIR     metoprolol succinate 25 MG 24 hr tablet  Commonly known as: TOPROL-XL  Take 1 tablet (25 mg total) by mouth once daily.     neomycin-polymyxin-hydrocortisone 3.5-10,000-1  mg/mL-unit/mL-% otic suspension  Commonly known as: CORTISPORIN  Place 3 drops into the left ear 3 (three) times daily.     ondansetron 4 MG tablet  Commonly known as: ZOFRAN  Take 1 tablet (4 mg total) by mouth every 8 (eight) hours as needed.     ondansetron 4 MG Tbdl  Commonly known as: ZOFRAN-ODT  Take 1 tablet (4 mg total) by mouth every 6 (six) hours as needed.     pantoprazole 40 MG tablet  Commonly known as: PROTONIX     promethazine 12.5 MG Tab  Commonly known as: PHENERGAN     rosuvastatin 10 MG tablet  Commonly known as: CRESTOR     sertraline 50 MG tablet  Commonly known as: ZOLOFT     UNABLE TO FIND            STOP taking these medications      OZEMPIC 0.25 mg or 0.5 mg (2 mg/3 mL) pen injector  Generic drug: semaglutide  Replaced by: WEGOVY 0.25 mg/0.5 mL Pnij  Stopped by: Candelario Chauhan MD               Where to Get Your Medications        These medications were sent to [facility] [address] [place] [address]      Hours: Mon-Fri, 8a-5:30p Phone: [phone]   furosemide 20 MG tablet  furosemide 20 MG tablet  spironolactone 25 MG tablet  WEGOVY 0.25 mg/0.5 mL Pnij         Orders Placed This Encounter   Procedures    Basic Metabolic Panel     Assessment & Plan    IMPRESSION:  Patient self Discontinued Entresto due to shortness of breath and difficulty breathing.  Started spironolactone to address fluid retention.  Continued Eliquis.  Weight gain of approximately 10 lbs since last visit correlates with increased shortness of breath.  Switched from Ozempic to Wegovy (prescription sent to [facility], [place]) at patient request    PLAN SUMMARY:  - Order labs to be done 1 week after starting spironolactone  - Refill Lasix, 1-2 pills as needed for fluid retention  - Start spironolactone  -  Wegovy prescription from [facility] [place]  - Follow up in 1 week for labs after starting spironolactone    HEART FAILURE with preserved ejection fraction  -  Refilled Lasix, 1-2 pills as needed for fluid retention, with 3 refills.  - Ordered labs to be done 1 week after starting spironolactone.  - initiated spironolactone    OBESITY MANAGEMENT:  -  Wegovy prescription from [facility], [facility], to check if insurance covers it.    FOLLOW-UP:  - Follow up in 1 week for labs after starting spironolactone.        ANTICOAGULATION MANAGEMENT for DVT:  - Continued Eliquis.    HYPERLIPIDEMIA:  - Continued Crestor.     DVT: Left lower extremity nonocclusive femoral vein DVT.    On Eliquis already.  Continue Eliquis.  Heme-Onc determined lifelong anticoagulation    Follow-up in 3 months      This note was generated with the assistance of ambient listening technology. Verbal consent was obtained by the patient and accompanying visitor(s) for the recording of patient appointment to facilitate this note. I attest to having reviewed and edited the generated note for accuracy, though some syntax or spelling errors may persist. Please contact the author of this note for any clarification.      Thank you very much for involving me in the care of your patient.  Please do not hesitate to contact me if there are any questions.      Candelario Chauhan MD, FACC, [facility]  Interventional Cardiologist, [facility].       Visit today included increased complexity associated with the care of the episodic problem dvt, chronic anticoagulation, ELEVATED BNP AND NEW ONSET CONGESTIVE HEART FAILURE addressed and managing the longitudinal care of the patient due to the serious and/or complex managed problem(s)   Patient Active Problem List   Diagnosis    Nephrolithiasis    Acute post-traumatic headache, not intractable    Chronic pain disorder    Fibromyalgia    Bacteremia due to Escherichia coli    E. coli UTI    Possible COVID-19 Infection    Essential hypertension    Seizure disorder    Asthma    MDD (major depressive disorder)    Ureteral stone    Left ureteral calculus    Leg DVT (deep  venous thromboembolism), acute, left    Anticoagulated    Elevated brain natriuretic peptide (BNP) level    Congestive heart failure     .      This note was dictated with the help of speech recognition software.  There might be un-intended errors and/or substitutions.

## 2025-06-24 ENCOUNTER — LAB VISIT (OUTPATIENT)
Dept: LAB | Facility: HOSPITAL | Age: 60
End: 2025-06-24
Attending: INTERNAL MEDICINE
Payer: COMMERCIAL

## 2025-06-24 DIAGNOSIS — I50.9 CONGESTIVE HEART FAILURE, UNSPECIFIED HF CHRONICITY, UNSPECIFIED HEART FAILURE TYPE: ICD-10-CM

## 2025-06-24 LAB
ANION GAP (OHS): 8 MMOL/L (ref 8–16)
BUN SERPL-MCNC: 19 MG/DL (ref 6–20)
CALCIUM SERPL-MCNC: 10.2 MG/DL (ref 8.7–10.5)
CHLORIDE SERPL-SCNC: 108 MMOL/L (ref 95–110)
CO2 SERPL-SCNC: 24 MMOL/L (ref 23–29)
CREAT SERPL-MCNC: 0.8 MG/DL (ref 0.5–1.4)
GFR SERPLBLD CREATININE-BSD FMLA CKD-EPI: >60 ML/MIN/1.73/M2
GLUCOSE SERPL-MCNC: 118 MG/DL (ref 70–110)
POTASSIUM SERPL-SCNC: 4.2 MMOL/L (ref 3.5–5.1)
SODIUM SERPL-SCNC: 140 MMOL/L (ref 136–145)

## 2025-06-24 PROCEDURE — 82310 ASSAY OF CALCIUM: CPT

## 2025-06-24 PROCEDURE — 36415 COLL VENOUS BLD VENIPUNCTURE: CPT

## 2025-06-30 ENCOUNTER — TELEPHONE (OUTPATIENT)
Dept: HEMATOLOGY/ONCOLOGY | Facility: CLINIC | Age: 60
End: 2025-06-30
Payer: COMMERCIAL

## 2025-06-30 NOTE — TELEPHONE ENCOUNTER
Phoned Pt, left VM asking her to call back and schedule.    Copied from CRM #[id number]. Topic: Appointments - Appointment Scheduling  >> Jun 30, 2025 11:51 AM Martin wrote:  Type:  Patient Call          Who Called: Patient         Does the patient know what this is regarding?: Requesting a call back about when she should have her next appointment scheduled ;please advise           Would the patient rather a call back or a response via [facility]? Call           Best Call Back Number: [phone]             Additional Information:

## 2025-07-07 ENCOUNTER — LAB VISIT (OUTPATIENT)
Dept: LAB | Facility: HOSPITAL | Age: 60
End: 2025-07-07
Attending: STUDENT IN AN ORGANIZED HEALTH CARE EDUCATION/TRAINING PROGRAM
Payer: COMMERCIAL

## 2025-07-07 ENCOUNTER — TELEPHONE (OUTPATIENT)
Dept: HEMATOLOGY/ONCOLOGY | Facility: CLINIC | Age: 60
End: 2025-07-07
Payer: COMMERCIAL

## 2025-07-07 ENCOUNTER — RESULTS FOLLOW-UP (OUTPATIENT)
Dept: HEMATOLOGY/ONCOLOGY | Facility: HOSPITAL | Age: 60
End: 2025-07-07

## 2025-07-07 DIAGNOSIS — I82.402 LEG DVT (DEEP VENOUS THROMBOEMBOLISM), ACUTE, LEFT: ICD-10-CM

## 2025-07-07 DIAGNOSIS — Z79.01 ANTICOAGULATED: ICD-10-CM

## 2025-07-07 LAB
ABSOLUTE EOSINOPHIL (OHS): 0.2 K/UL
ABSOLUTE MONOCYTE (OHS): 0.32 K/UL (ref 0.3–1)
ABSOLUTE NEUTROPHIL COUNT (OHS): 5.4 K/UL (ref 1.8–7.7)
ALBUMIN SERPL BCP-MCNC: 4.1 G/DL (ref 3.5–5.2)
ALP SERPL-CCNC: 70 UNIT/L (ref 40–150)
ALT SERPL W/O P-5'-P-CCNC: 11 UNIT/L (ref 10–44)
ANION GAP (OHS): 11 MMOL/L (ref 8–16)
AST SERPL-CCNC: 14 UNIT/L (ref 11–45)
BASOPHILS # BLD AUTO: 0.05 K/UL
BASOPHILS NFR BLD AUTO: 0.6 %
BILIRUB SERPL-MCNC: 0.6 MG/DL (ref 0.1–1)
BUN SERPL-MCNC: 13 MG/DL (ref 6–20)
CALCIUM SERPL-MCNC: 9.5 MG/DL (ref 8.7–10.5)
CHLORIDE SERPL-SCNC: 106 MMOL/L (ref 95–110)
CO2 SERPL-SCNC: 26 MMOL/L (ref 23–29)
CREAT SERPL-MCNC: 1 MG/DL (ref 0.5–1.4)
ERYTHROCYTE [DISTWIDTH] IN BLOOD BY AUTOMATED COUNT: 13.2 % (ref 11.5–14.5)
GFR SERPLBLD CREATININE-BSD FMLA CKD-EPI: >60 ML/MIN/1.73/M2
GLUCOSE SERPL-MCNC: 108 MG/DL (ref 70–110)
HCT VFR BLD AUTO: 44.7 % (ref 37–48.5)
HGB BLD-MCNC: 14.7 GM/DL (ref 12–16)
IMM GRANULOCYTES # BLD AUTO: 0.03 K/UL (ref 0–0.04)
IMM GRANULOCYTES NFR BLD AUTO: 0.4 % (ref 0–0.5)
LYMPHOCYTES # BLD AUTO: 2.23 K/UL (ref 1–4.8)
MCH RBC QN AUTO: 29.5 PG (ref 27–31)
MCHC RBC AUTO-ENTMCNC: 32.9 G/DL (ref 32–36)
MCV RBC AUTO: 90 FL (ref 82–98)
NUCLEATED RBC (/100WBC) (OHS): 0 /100 WBC
PLATELET # BLD AUTO: 267 K/UL (ref 150–450)
PMV BLD AUTO: 10.6 FL (ref 9.2–12.9)
POTASSIUM SERPL-SCNC: 3.7 MMOL/L (ref 3.5–5.1)
PROT SERPL-MCNC: 7.7 GM/DL (ref 6–8.4)
RBC # BLD AUTO: 4.98 M/UL (ref 4–5.4)
RELATIVE EOSINOPHIL (OHS): 2.4 %
RELATIVE LYMPHOCYTE (OHS): 27.1 % (ref 18–48)
RELATIVE MONOCYTE (OHS): 3.9 % (ref 4–15)
RELATIVE NEUTROPHIL (OHS): 65.6 % (ref 38–73)
SODIUM SERPL-SCNC: 143 MMOL/L (ref 136–145)
WBC # BLD AUTO: 8.23 K/UL (ref 3.9–12.7)

## 2025-07-07 PROCEDURE — 80053 COMPREHEN METABOLIC PANEL: CPT

## 2025-07-07 PROCEDURE — 36415 COLL VENOUS BLD VENIPUNCTURE: CPT

## 2025-07-07 PROCEDURE — 85025 COMPLETE CBC W/AUTO DIFF WBC: CPT

## 2025-07-08 ENCOUNTER — OFFICE VISIT (OUTPATIENT)
Dept: HEMATOLOGY/ONCOLOGY | Facility: CLINIC | Age: 60
End: 2025-07-08
Payer: COMMERCIAL

## 2025-07-08 VITALS
DIASTOLIC BLOOD PRESSURE: 78 MMHG | HEART RATE: 66 BPM | SYSTOLIC BLOOD PRESSURE: 128 MMHG | BODY MASS INDEX: 29.66 KG/M2 | TEMPERATURE: 98 F | WEIGHT: 173.75 LBS | OXYGEN SATURATION: 96 % | HEIGHT: 64 IN

## 2025-07-08 DIAGNOSIS — I82.402 LEG DVT (DEEP VENOUS THROMBOEMBOLISM), ACUTE, LEFT: Primary | ICD-10-CM

## 2025-07-08 DIAGNOSIS — R07.9 CHEST PAIN, UNSPECIFIED TYPE: ICD-10-CM

## 2025-07-08 DIAGNOSIS — R60.0 BILATERAL LOWER EXTREMITY EDEMA: ICD-10-CM

## 2025-07-08 DIAGNOSIS — Z79.01 ANTICOAGULATED: ICD-10-CM

## 2025-07-08 PROCEDURE — 3008F BODY MASS INDEX DOCD: CPT | Mod: CPTII,S$GLB,, | Performed by: STUDENT IN AN ORGANIZED HEALTH CARE EDUCATION/TRAINING PROGRAM

## 2025-07-08 PROCEDURE — 1159F MED LIST DOCD IN RCRD: CPT | Mod: CPTII,S$GLB,, | Performed by: STUDENT IN AN ORGANIZED HEALTH CARE EDUCATION/TRAINING PROGRAM

## 2025-07-08 PROCEDURE — 99999 PR PBB SHADOW E&M-EST. PATIENT-LVL V: CPT | Mod: PBBFAC,,, | Performed by: STUDENT IN AN ORGANIZED HEALTH CARE EDUCATION/TRAINING PROGRAM

## 2025-07-08 PROCEDURE — G2211 COMPLEX E/M VISIT ADD ON: HCPCS | Mod: S$GLB,,, | Performed by: STUDENT IN AN ORGANIZED HEALTH CARE EDUCATION/TRAINING PROGRAM

## 2025-07-08 PROCEDURE — 3078F DIAST BP <80 MM HG: CPT | Mod: CPTII,S$GLB,, | Performed by: STUDENT IN AN ORGANIZED HEALTH CARE EDUCATION/TRAINING PROGRAM

## 2025-07-08 PROCEDURE — 3044F HG A1C LEVEL LT 7.0%: CPT | Mod: CPTII,S$GLB,, | Performed by: STUDENT IN AN ORGANIZED HEALTH CARE EDUCATION/TRAINING PROGRAM

## 2025-07-08 PROCEDURE — 3074F SYST BP LT 130 MM HG: CPT | Mod: CPTII,S$GLB,, | Performed by: STUDENT IN AN ORGANIZED HEALTH CARE EDUCATION/TRAINING PROGRAM

## 2025-07-08 PROCEDURE — 99215 OFFICE O/P EST HI 40 MIN: CPT | Mod: S$GLB,,, | Performed by: STUDENT IN AN ORGANIZED HEALTH CARE EDUCATION/TRAINING PROGRAM

## 2025-07-08 PROCEDURE — 4010F ACE/ARB THERAPY RXD/TAKEN: CPT | Mod: CPTII,S$GLB,, | Performed by: STUDENT IN AN ORGANIZED HEALTH CARE EDUCATION/TRAINING PROGRAM

## 2025-07-08 NOTE — PROGRESS NOTES
Hematology- Oncology Clinic Note :     7/8/2025    Chief Complaint   Patient presents with    Follow-up     DVT         HPI  Pt is a 60 y.o. female who  has a past medical history of Asthma, Brain mass (2007), Depression, Fibromyalgia, Hypertension, and Seizures (2007)..  Who presents for follow up of DVT.  Pt recently presented to  ED after complaining of bilateral leg swelling 2 weeks duration.  CTA negative for PE but LLE US positive for non occlusive DVT.  Patient also states she had a knee replacement surgery one year ago and a few months later she had a blood clot. Patient further states she had an epidural injection last Friday prior to ED presentation. Patient denied recent flights or was on blood thinners. Even though clots most likely provoked since she has a hx of multiple clots hypercoag workup ordered and will benefit from lifelong eliquis. Bleeding precautions given and pt agreeable to continue eliquis.      Interval hx:    Since last visit pt has been having worsening generalized pain and LE edema.  Pt compliant with eliquis and cardiac related meds are being adjusted with some improvement in symptoms.  Will get repeat LE US to monitor clot and eliquis refilled.  All questions answered to her satisfaction.        Active Problem List with Overview Notes    Diagnosis Date Noted    Elevated brain natriuretic peptide (BNP) level 02/06/2025    Congestive heart failure 02/06/2025    Leg DVT (deep venous thromboembolism), acute, left 03/06/2024    Anticoagulated 03/06/2024    Left ureteral calculus 07/31/2020    Bacteremia due to Escherichia coli 07/19/2020    E. coli UTI 07/19/2020    Possible COVID-19 Infection 07/19/2020    Essential hypertension 07/19/2020    Seizure disorder 07/19/2020    Asthma 07/19/2020    MDD (major depressive disorder) 07/19/2020    Ureteral stone 07/19/2020    Chronic pain disorder 08/06/2018    Fibromyalgia 08/06/2018    Acute post-traumatic headache, not intractable      Nephrolithiasis 2015       Patient Active Problem List    Diagnosis Date Noted    Elevated brain natriuretic peptide (BNP) level 2025    Congestive heart failure 2025    Leg DVT (deep venous thromboembolism), acute, left 2024    Anticoagulated 2024    Left ureteral calculus 2020    Bacteremia due to Escherichia coli 2020    E. coli UTI 2020    Possible COVID-19 Infection 2020    Essential hypertension 2020    Seizure disorder 2020    Asthma 2020    MDD (major depressive disorder) 2020    Ureteral stone 2020    Chronic pain disorder 2018    Fibromyalgia 2018    Acute post-traumatic headache, not intractable     Nephrolithiasis 2015     Past Medical History:   Diagnosis Date    Asthma     Brain mass     Depression     Fibromyalgia     Hypertension     Seizures     fell and hit head at grocery         Past Surgical History:   Procedure Laterality Date    CARPAL TUNNEL RELEASE      right     SECTION      x 3    cysto/ureteroscopy stent placement      CYSTOSCOPY W/ URETERAL STENT PLACEMENT Left 2020    Procedure: CYSTOSCOPY, WITH URETERAL STENT INSERTION;  Surgeon: Yessi Granados MD;  Location: [facility] OR;  Service: Urology;  Laterality: Left;    HYSTERECTOMY      RETROGRADE PYELOGRAPHY N/A 2020    Procedure: PYELOGRAM, RETROGRADE;  Surgeon: Yessi Granados MD;  Location: [facility] OR;  Service: Urology;  Laterality: N/A;    TONSILLECTOMY      URETEROSCOPIC REMOVAL OF URETERIC CALCULUS Left 2020    Procedure: Cystoscopy, possible retrograde pyelogram, ureteroscopy with laser lithotripsy, placement of ureteral stent;  Surgeon: Yessi Granados MD;  Location: [facility] OR;  Service: Urology;  Laterality: Left;  RN PREOP 2020---COVID NEGATIVE ON       (Not in a hospital admission)    Review of patient's allergies indicates:   Allergen Reactions    Penicillins Itching    Codeine  Itching      Social History     Tobacco Use    Smoking status: Never     Passive exposure: Never    Smokeless tobacco: Never   Substance Use Topics    Alcohol use: Yes     Comment: occasionally      Family History   Problem Relation Name Age of Onset    Breast cancer Mother  60    Cancer Mother          Breast cancer    Heart disease Mother      Diabetes Father      Cancer Maternal Grandmother          Lung Cancer    Cancer Maternal Grandfather          Lung Cancer    Cancer Paternal Grandmother      Cancer Paternal Grandfather          Review of Systems :  Review of Systems   Constitutional:  Negative for chills, fever, malaise/fatigue and weight loss.   HENT:  Negative for hearing loss.    Eyes:  Negative for blurred vision and pain.   Respiratory:  Negative for cough, sputum production and shortness of breath.    Cardiovascular:  Positive for leg swelling.   Gastrointestinal:  Positive for nausea and vomiting. Negative for constipation, diarrhea and heartburn.   Genitourinary:  Negative for dysuria and hematuria.   Musculoskeletal:  Positive for joint pain and myalgias.   Skin:  Negative for itching and rash.   Neurological:  Negative for dizziness and sensory change.   Endo/Heme/Allergies:  Does not bruise/bleed easily.   Psychiatric/Behavioral:  Negative for depression. The patient is not nervous/anxious.        Physical Exam :  Wt Readings from Last 3 Encounters:   07/08/25 78.8 kg (173 lb 11.6 oz)   06/17/25 80.4 kg (177 lb 5.8 oz)   05/09/25 78 kg (171 lb 15.3 oz)     Temp Readings from Last 3 Encounters:   07/08/25 98.4 °F (36.9 °C)   02/20/25 97.9 °F (36.6 °C) (Oral)   02/03/25 97.8 °F (36.6 °C) (Oral)     BP Readings from Last 3 Encounters:   07/08/25 128/78   06/17/25 136/81   05/09/25 115/78     Pulse Readings from Last 3 Encounters:   07/08/25 66   06/17/25 72   05/09/25 66     Body mass index is 29.82 kg/m².    Physical Exam  Vitals reviewed.   HENT:      Head: Normocephalic and atraumatic.      Nose:  Nose normal.      Mouth/Throat:      Mouth: Mucous membranes are moist.   Eyes:      Pupils: Pupils are equal, round, and reactive to light.   Cardiovascular:      Rate and Rhythm: Normal rate and regular rhythm.   Pulmonary:      Effort: Pulmonary effort is normal.      Breath sounds: Normal breath sounds.   Abdominal:      General: Abdomen is flat.   Musculoskeletal:         General: Normal range of motion.      Cervical back: Normal range of motion and neck supple.   Skin:     General: Skin is warm and dry.   Neurological:      Mental Status: She is alert. Mental status is at baseline.   Psychiatric:         Mood and Affect: Mood normal.         Behavior: Behavior normal.         Pertinent Diagnostic studies:    Recent Results (from the past 24 hours)   Comprehensive Metabolic Panel    Collection Time: 07/07/25  5:27 PM   Result Value Ref Range    Sodium 143 136 - 145 mmol/L    Potassium 3.7 3.5 - 5.1 mmol/L    Chloride 106 95 - 110 mmol/L    CO2 26 23 - 29 mmol/L    Glucose 108 70 - 110 mg/dL    BUN 13 6 - 20 mg/dL    Creatinine 1.0 0.5 - 1.4 mg/dL    Calcium 9.5 8.7 - 10.5 mg/dL    Protein Total 7.7 6.0 - 8.4 gm/dL    Albumin 4.1 3.5 - 5.2 g/dL    Bilirubin Total 0.6 0.1 - 1.0 mg/dL    ALP 70 40 - 150 unit/L    AST 14 11 - 45 unit/L    ALT 11 10 - 44 unit/L    Anion Gap 11 8 - 16 mmol/L    eGFR >60 >60 mL/min/1.73/m2   CBC with Differential    Collection Time: 07/07/25  5:27 PM   Result Value Ref Range    WBC 8.23 3.90 - 12.70 K/uL    RBC 4.98 4.00 - 5.40 M/uL    HGB 14.7 12.0 - 16.0 gm/dL    HCT 44.7 37.0 - 48.5 %    MCV 90 82 - 98 fL    MCH 29.5 27.0 - 31.0 pg    MCHC 32.9 32.0 - 36.0 g/dL    RDW 13.2 11.5 - 14.5 %    Platelet Count 267 150 - 450 K/uL    MPV 10.6 9.2 - 12.9 fL    Nucleated RBC 0 <=0 /100 WBC    Neut % 65.6 38 - 73 %    Lymph % 27.1 18 - 48 %    Mono % 3.9 (L) 4 - 15 %    Eos % 2.4 <=8 %    Basophil % 0.6 <=1.9 %    Imm Grans % 0.4 0.0 - 0.5 %    Neut # 5.40 1.8 - 7.7 K/uL    Lymph # 2.23 1 -  4.8 K/uL    Mono # 0.32 0.3 - 1 K/uL    Eos # 0.20 <=0.5 K/uL    Baso # 0.05 <=0.2 K/uL    Imm Grans # 0.03 0.00 - 0.04 K/uL       Assessment/Plan :       LLE DVT/Hx of clots  -Past DVT most likely provoked by surgery  -Seen on recent ED visit after epidural, inactivity can contribute  -Nonocclusive deep venous thrombosis within the left femoral vein on US  -Has cardiology follow up  -hypercoag workup  unremarkable  -Would recommend lifelong eliquis, refills sent to pharmacy  -RTC in 4 weeks for MD telephone visit with US LE 3-4 days prior      Intractable N/V worsening leg pain  -Chronic  -Most likely referred knee pain  -Clot stable on imaging   -Repeat LE US      Lung nodule  -Seen on recent CTA  -Stable 1.2 cm right lower lobe pulmonary nodule, requiring no follow-up as appears stable compared to remote CT from 2015 and likely reflecting benign etiology per radiology   -Pt following with pulmonologist       Time spent on case: 30 minutes    Summary of orders placed this encounter:  Orders Placed This Encounter   Procedures    US Lower Extremity Veins Bilateral       Future Appointments   Date Time Provider Department Center   7/17/2025  2:00 PM Candelario Chauhan MD [facility] CARDIO [facility]   7/28/2025  2:30 PM [facility] [id number] [facility] VASCUS [facility]   8/5/2025  1:40 PM Kelin Mcdaniels MD [facility] HEM ONC [facility]                 Kelin Mcdaniels MD   Hematology/oncology, [facility]

## 2025-07-17 ENCOUNTER — OFFICE VISIT (OUTPATIENT)
Dept: CARDIOLOGY | Facility: CLINIC | Age: 60
End: 2025-07-17
Payer: COMMERCIAL

## 2025-07-17 VITALS
RESPIRATION RATE: 15 BRPM | BODY MASS INDEX: 29.84 KG/M2 | SYSTOLIC BLOOD PRESSURE: 123 MMHG | DIASTOLIC BLOOD PRESSURE: 76 MMHG | HEART RATE: 72 BPM | WEIGHT: 174.81 LBS | OXYGEN SATURATION: 97 % | HEIGHT: 64 IN

## 2025-07-17 DIAGNOSIS — I82.402 LEG DVT (DEEP VENOUS THROMBOEMBOLISM), ACUTE, LEFT: ICD-10-CM

## 2025-07-17 DIAGNOSIS — I50.9 CONGESTIVE HEART FAILURE, UNSPECIFIED HF CHRONICITY, UNSPECIFIED HEART FAILURE TYPE: Primary | ICD-10-CM

## 2025-07-17 DIAGNOSIS — I82.4Y9 DEEP VEIN THROMBOSIS (DVT) OF PROXIMAL LOWER EXTREMITY, UNSPECIFIED CHRONICITY, UNSPECIFIED LATERALITY: ICD-10-CM

## 2025-07-17 DIAGNOSIS — R07.9 CHEST PAIN, UNSPECIFIED TYPE: ICD-10-CM

## 2025-07-17 DIAGNOSIS — R79.89 ELEVATED BRAIN NATRIURETIC PEPTIDE (BNP) LEVEL: ICD-10-CM

## 2025-07-17 PROCEDURE — 99999 PR PBB SHADOW E&M-EST. PATIENT-LVL V: CPT | Mod: PBBFAC,,, | Performed by: INTERNAL MEDICINE

## 2025-07-17 RX ORDER — SEMAGLUTIDE 0.5 MG/.5ML
0.5 INJECTION, SOLUTION SUBCUTANEOUS
Qty: 2 ML | Refills: 2 | Status: SHIPPED | OUTPATIENT
Start: 2025-07-17

## 2025-07-17 NOTE — PROGRESS NOTES
CARDIOVASCULAR CONSULTATION    REASON FOR CONSULT:   Myra Harrison is a 60 y.o. female who presents for   Chief Complaint   Patient presents with    Follow-up     HISTORY OF PRESENT ILLNESS:     Patient is a pleasant 58-year-old lady.  Had knee surgery done last year.  After that DVT of left femoral vein.  Has had multiple ultrasounds done since then which have revealed partially occlusive DVT of left femoral vein, including 1 done recently at [facility].  She has musculoskeletal myalgias and bilateral leg pains which occur from the thighs all the way down to the feet.  No particular aggravating or relieving factors.  She has brought a she would from her primary care physician requesting a CTA angiogram of the legs to check for circulation.  She did have CT angiogram of her lungs performed in November which did not reveal any pulmonary embolism.  Denies orthopnea PND.  Does have chronic cough.      Impression:     1. No evidence of PE.  2. No acute intrathoracic abnormalities identified.  3. Stable 1.2 cm right lower lobe pulmonary nodule, requiring no follow-up as appears stable compared to remote CT from 2015 and likely reflecting benign etiology.    Impression:     Suspected ongoing nonocclusive thrombus in the left femoral vein.  No DVT in the right lower extremity.       Impression:     Unchanged appearance of nonocclusive thrombus within the proximal to mid aspect of the left femoral vein.        CONCLUSIONS     Normal left ventricular systolic function.     Calculated left ventricular ejection fraction by 2D tracking is 60 %.     Normal diastolic function.     There were no regional wall motion abnormalities.     Indeterminate right ventricular systolic function.       - TAPSE 12.4mm, S' 10.3cm/s.       - Normal Right ventricular systolic pressure 26.2 mmHg.     Normal atrial dimensions.     Normal cardiac valve structures.     Normal doppler study - trace MR, TR, MN only.    Findings:     Right  CFV: Patent   Right GSV: Patent   Right PFV: Patent   Right SFV Prox: Patent   Right SFV Mid: Patent   Right SFV Distal: Patent   Right Popliteal: Patent   Right Peroneal: Patent   Right PTV: Patent     Left CFV: Patent   Left GSV: Patent   Left PFV: Patent   Left SFV Prox: Partially Occluded   Left SFV Mid: Patent   Left SFV Distal: Patent   Left Popliteal: Patent   Left Peroneal: Patent   Left PTV: Patent      Notes from April 2024: Patient here for follow-up.  Denies any chest pains at rest on exertion, orthopnea, PND.  Has been doing fine.  Main complaint is chronic dyspnea on exertion which has been going on for many years      02/25/2021 Dobutamine Stress Test  Findings:   The left ventricle is normal in size.   Post stress SPECT images demonstrate normal perfusion in all regions. The rest images reveal no reversibility. Gated SPECT imaging demonstrates normal wall motion in all regions with a left ventricular ejection fraction estimated to be 68.00%.      Impression:  Stress ECG portion was negative for ischemia.   The overall study quality is good.   Normal perfusion scan with no evidence of inducible ischemia or areas of infarct      There is no prior study for comparison    November 21st 2024    History of Present Illness    CHIEF COMPLAINT:  Myra presents today for leg pain.    LEG PAIN:  She reports constant pain in both legs. The exact location within the legs and specific cause are not identified.    CHEST DISCOMFORT:  She experiences occasional chest pain, including a cramp during the EKG procedure. She denies constant chest pressure or pain related to specific activities such as walking or lying down.    CIRCULATION:  Recent venous test revealed a chronic clot in the superficial veins, which may be causing inflammation and pain. No other circulatory concerns were noted.    MEDICATIONS:  She continues adherence to prescribed blood thinners.    WEIGHT:  She reports being overweight.         Recent  ultrasound done in November 20, 2024 demonstrated no evidence of right lower extremity DVT, left common femoral vein was patent and left superficial femoral proximal vein at partial compressibility with chronic thrombus as well as left superficial femoral middle vein had partial compressible lip 80 with chronic thrombus    Arterial ultrasound showed no significant flow-limiting stenosis    Feb 25:    History of Present Illness    CHIEF COMPLAINT:  Myra presents today for fatigue, leg pain, and back pain.    CURRENT SYMPTOMS:  She reports fatigue with generalized weakness. She experiences pain in both legs and back. She reports chest pain occurring multiple times daily, varying in location from center to left side of chest, upper chest, and back with radiation under the arm. She experiences shortness of breath. She has a persistent cough despite taking allergy medications and Albuterol.    RECENT ER VISIT:  She reports an ER visit on Monday where BNP was elevated. She received oral medication and IV placement without medication administration.    FLUID RETENTION:  She reports fluid retention despite being on Triamterene, noting she is unable to wear her wedding ring.    MEDICAL HISTORY:  She has a history of blood clots  with confirmed involvement of the left leg.    CURRENT MEDICATIONS:  She takes eliquis 5 mg twice daily, triamterene hydrochlorothiazide, Albuterol, allergy medications, and Crestor. She reports taking Lisinopril, though dosage is unclear and medication is not on official medication list.         February 21, 2025    History of Present Illness    CHIEF COMPLAINT:  Myra presents today for follow up after an ER visit due to passing out while sitting in a chair    HISTORY OF PRESENT ILLNESS:  She experienced a syncopal episode while sitting in a chair at the ER, losing consciousness with no memory of the event. She denies any prior history of passing out. She reports intermittent dizziness upon  "standing, leg pain, numbness and tingling in fingertips, and cold feet with finger swelling. She noted blood on her pillow from ear bleeding yesterday morning.  On her last visit she was initiated on spironolactone and Entresto and feels much better.  Her swelling has gone away as well as her shortness of breath has improved significantly.  Her BNP has also normalized on this therapy    MEDICAL HISTORY:  She has history of congestive heart failure and DVT requiring lifelong anticoagulation therapy.    FAMILY HISTORY:  Father has diabetes.    CURRENT MEDICATIONS:  She takes metoprolol, rosuvastatin, Entresto, spironolactone, Eliquis, and Lasix as needed.       March 2025    History of Present Illness    CHIEF COMPLAINT:  Myra presents today for evaluation of chest pain and shortness of breath.    CARDIOVASCULAR SYMPTOMS:  She reports experiencing chest pain periodically for the past 1-2 months, occurring both at rest and with activity. She experiences palpitations described as a racing heart sensation multiple times daily. Associated symptoms include facial flushing and redness, describing her face feeling "on fire." No specific triggers for the chest pain episodes are identified. She has had four emergency department visits in the past month.    RESPIRATORY:  She reports dyspnea with exertion, limiting her walking distance. She becomes short of breath with household activities such as mopping and while walking at the mall.    MUSCULOSKELETAL:  She experiences burning sensation in both legs when walking and exercising. She reports charley horses in her calves at night that are painful to touch.    MEDICATIONS:  Current medications include Lasix 20mg daily, metoprolol 25mg daily, Crestor, spironolactone half pill, Eliquis, and Entresto. She reports heartburn with medications, though specific medication causing the symptom has not been identified.    SOCIAL HISTORY:  She is a never smoker but reports significant " "secondhand smoke exposure throughout her life due to both parents smoking during her childhood and beyond.         May 25:    History of Present Illness    CHIEF COMPLAINT:  Myra presents today for follow up of test results    DIAGNOSTIC STUDIES:  Stress test and echo were normal. Lower extremity arterial studies showed no evidence of stenosis. Holter monitor showed no arrhythmia.    MUSCULOSKELETAL:  She reports bilateral leg pain, described as hurting "all over" and in the back of the legs with muscle cramps and aileen horses occurring throughout both day and night that wake her from sleep. She reports difficulty walking due to pain. She has history of knee surgery on one side with recommendation for contralateral knee surgery, which she is hesitant to pursue due to concerns about blood clots.    MEDICAL HISTORY:  She has a history of blood clots in the LLE. Her mother  from breast cancer.    MEDICATIONS:  Current medications include Eliquis, Entresto, metoprolol, and rosuvastatin. She discontinued spironolactone due to concerns about breast cancer risk. She has Lasix available for as-needed use with swelling.         Results for orders placed or performed during the hospital encounter of 25   EKG 12-lead    Collection Time: 25 12:20 PM   Result Value Ref Range    QRS Duration 90 ms    [facility] QTC Calculation 411 ms    Narrative    Test Reason : I50.9,I82.402,G89.4,    Vent. Rate :  45 BPM     Atrial Rate :  45 BPM     P-R Int : 182 ms          QRS Dur :  90 ms      QT Int : 476 ms       P-R-T Axes :  59  73  67 degrees    QTcB Int : 411 ms    Sinus bradycardia  Otherwise normal ECG  When compared with ECG of 2025 13:55,  No significant change was found  Confirmed by Mary Wright ([id number]) on 2025 10:58:13 PM    Referred By: AMRY WRIGHT           Confirmed By: Mary Wright       Results for orders placed during the hospital encounter of 25    Echo    Interpretation Summary    Left " Ventricle: The left ventricle is normal in size. Normal wall thickness. There is normal systolic function with a visually estimated ejection fraction of 55 - 60%.    Right Ventricle: Right ventricular enlargement. Systolic function is normal.    Left Atrium: Moderately dilated    Right Atrium: Right atrium is moderately dilated.    Mitral Valve: There is mild regurgitation.    Tricuspid Valve: There is mild regurgitation.    Pulmonary Artery: The estimated pulmonary artery systolic pressure is 26 mmHg.    IVC/SVC: Normal venous pressure at 3 mmHg.      Results for orders placed during the hospital encounter of 05/01/25    Nuclear Stress - Cardiology Interpreted    Interpretation Summary    Normal myocardial perfusion scan. There is no evidence of myocardial ischemia or infarction.    The gated perfusion images showed an ejection fraction of 68% at rest.    There is normal wall motion at rest and post-stress.    The ECG portion of the study is negative for ischemia.    The patient reported no chest pain during the stress test.    There were no arrhythmias during stress.      No results found for this or any previous visit.      Predominant Rhythm is sinus rhythm with heart rates varying between 43 and 114 BPM with an average of 68 BPM.    Supraventricular Arrhythmias: There were very rare PACs totalling 5 and averaging 0.05 per hour.    Ventricular Arrhythmias: There were very rare PVCs totalling 164 and averaging 1.71 per hour.         Multiphasic waveforms bilateral lower extremity without hemodynamically significant peripheral arterial disease.      Notes from June 25:    History of Present Illness    CHIEF COMPLAINT:  Myra presents today for follow up of cardiac condition.    CARDIOVASCULAR:  She experienced dyspnea while taking Entresto, particularly when lying down, with sensation of fluid in chest. Symptoms were alleviated by elevating head with pillows. She stopped Entresto for 1 week  with some  improvement in symptoms.  Has not restarted Entresto.    MEDICATIONS:  She discontinued spironolactone due to concerns about breast cancer risk given family history. She reports bruising while on Eliquis. She has not started Ozempic due to pharmacy and insurance coverage issues.      WEIGHT MANAGEMENT:  She reports current weight of 177 lbs, a 10-pound increase since last visit. She has implemented dietary modifications and resumed walking for exercise.    MEDICAL HISTORY:  She has history of asthma and COPD.    FAMILY HISTORY:  Mother  from breast cancer.         :    History of Present Illness    CHIEF COMPLAINT:  Myra presents today with back pain radiating to chest with associated dyspnea.    HISTORY OF PRESENT ILLNESS:  She reports new onset of back and chest pain that began yesterday. Pain originates in the upper back and radiates to the chest with associated difficulty breathing. Symptoms are intermittent. She denies previous similar episodes of this pain pattern.    MEDICAL HISTORY:  She has a history of:  - Cardiac failure with preserved EF  - COPD diagnosed by previous provider  - Asthma  She denies history of smoking. She has been evaluated by pulmonology in the past. She currently has a cough consistent with COPD symptomatology.    RECENT DIAGNOSTIC STUDIES:  Recent cardiac workup showed:  - Normal stress test in May  - Normal GRACIELA studies  - Normal echo  - Normal BNP  - Aortic US showed no aneurysm    CURRENT MEDICATIONS:  She is currently taking:  - Eliquis  - Metoprolol  - Crestor  - Spironolactone  - Wegovy 0.25 mg  She reports tolerating all medications without side effects.    WEIGHT MANAGEMENT:  She reports recent weight gain since her last visit. She notes Wegovy is helping with weight management, though uncertain if current dosage is sufficiently effective.     ADDITIONAL SYMPTOMS:  She reports:  - Ongoing shoulder pain localized to RUE, possibly arthritic in nature.          PAST MEDICAL HISTORY:     Past Medical History:   Diagnosis Date    Asthma     Brain mass 2007    Depression     Fibromyalgia     Hypertension     Seizures 2007    fell and hit head at grocery          PAST SURGICAL HISTORY:     Past Surgical History:   Procedure Laterality Date    CARPAL TUNNEL RELEASE      right     SECTION      x 3    cysto/ureteroscopy stent placement      CYSTOSCOPY W/ URETERAL STENT PLACEMENT Left 2020    Procedure: CYSTOSCOPY, WITH URETERAL STENT INSERTION;  Surgeon: Yessi Granados MD;  Location: [facility] OR;  Service: Urology;  Laterality: Left;    HYSTERECTOMY      RETROGRADE PYELOGRAPHY N/A 2020    Procedure: PYELOGRAM, RETROGRADE;  Surgeon: Yessi Granados MD;  Location: [facility] OR;  Service: Urology;  Laterality: N/A;    TONSILLECTOMY      URETEROSCOPIC REMOVAL OF URETERIC CALCULUS Left 2020    Procedure: Cystoscopy, possible retrograde pyelogram, ureteroscopy with laser lithotripsy, placement of ureteral stent;  Surgeon: Yessi Granados MD;  Location: [facility] OR;  Service: Urology;  Laterality: Left;  RN PREOP 2020---COVID NEGATIVE ON            SOCIAL HISTORY:     Social History     Socioeconomic History    Marital status:    Tobacco Use    Smoking status: Never     Passive exposure: Never    Smokeless tobacco: Never   Substance and Sexual Activity    Alcohol use: Yes     Comment: occasionally    Drug use: No    Sexual activity: Yes     Social Drivers of Health     Financial Resource Strain: Low Risk  (2025)    Overall Financial Resource Strain (CARDIA)     Difficulty of Paying Living Expenses: Not hard at all   Food Insecurity: No Food Insecurity (2025)    Hunger Vital Sign     Worried About Running Out of Food in the Last Year: Never true     Ran Out of Food in the Last Year: Never true   Transportation Needs: No Transportation Needs (2025)    PRAPARE - Transportation     Lack of Transportation (Medical): No     Lack  "of Transportation (Non-Medical): No   Physical Activity: Sufficiently Active (2/24/2025)    Exercise Vital Sign     Days of Exercise per Week: 3 days     Minutes of Exercise per Session: 60 min   Stress: No Stress Concern Present (2/24/2025)    [employer] [place] [employer] - Occupational Stress Questionnaire     Feeling of Stress : Not at all   Housing Stability: Low Risk  (2/24/2025)    Housing Stability Vital Sign     Unable to Pay for Housing in the Last Year: No     Number of Times Moved in the Last Year: 0     Homeless in the Last Year: No       FAMILY HISTORY:     Family History   Problem Relation Name Age of Onset    Breast cancer Mother  60    Cancer Mother          Breast cancer    Heart disease Mother      Diabetes Father      Cancer Maternal Grandmother          Lung Cancer    Cancer Maternal Grandfather          Lung Cancer    Cancer Paternal Grandmother      Cancer Paternal Grandfather         REVIEW OF SYSTEMS:   Review of Systems   Constitutional: Negative.   HENT: Negative.     Eyes: Negative.    Endocrine: Negative.    Hematologic/Lymphatic: Negative.    Skin: Negative.    Musculoskeletal:  Positive for joint pain and myalgias.   Gastrointestinal: Negative.    Genitourinary: Negative.    Neurological: Negative.    Psychiatric/Behavioral: Negative.     Allergic/Immunologic: Negative.        A 10 point review of systems was performed and all the pertinent positives have been mentioned. Rest of review of systems was negative.        PHYSICAL EXAM:     Vitals:    07/17/25 1402   BP: 123/76   Pulse: 72   Resp: 15    Body mass index is 30.01 kg/m².  Weight: 79.3 kg (174 lb 13.2 oz)   Height: 5' 4" (162.6 cm)     Physical Exam  Constitutional:       Appearance: Normal appearance. She is well-developed.   HENT:      Head: Normocephalic.   Eyes:      Pupils: Pupils are equal, round, and reactive to light.   Cardiovascular:      Rate and Rhythm: Normal rate and regular rhythm.   Pulmonary:      " Effort: Pulmonary effort is normal.      Breath sounds: Normal breath sounds.   Abdominal:      General: Bowel sounds are normal.      Palpations: Abdomen is soft.      Tenderness: There is no abdominal tenderness.   Musculoskeletal:         General: Normal range of motion.      Cervical back: Normal range of motion and neck supple.   Skin:     General: Skin is warm.   Neurological:      Mental Status: She is alert and oriented to person, place, and time.           DATA:     Laboratory:  CBC:  Recent Labs   Lab 02/20/25  1448 05/02/25  0918 07/07/25  1727   WBC 7.21 5.98 8.23   Hemoglobin 14.7  --   --    HGB  --  13.9 14.7   Hematocrit 43.7  --   --    HCT  --  44.4 44.7   Platelet Count  --  213 267   Platelets 266  --   --        CHEMISTRIES:  Recent Labs   Lab 02/20/25  1448 06/24/25  1259 07/07/25  1727   Glucose 96 118 H 108   Sodium 142 140 143   Potassium 3.8 4.2 3.7   BUN 20 19 13   Creatinine 1.1 0.8 1.0   Calcium 9.8 10.2 9.5       CARDIAC BIOMARKERS:  Recent Labs   Lab 11/15/23  1657 12/29/23  1128 02/01/24  1244 02/03/25  1623 02/20/25  1448   CPK  --   --  47 43  --    Troponin I <0.006 <0.006  --   --  <0.006       COAGS:  Recent Labs   Lab 02/01/24  1244 02/03/25  1622   INR 1.0 1.0       LIPIDS/LFTS:  Recent Labs   Lab 02/03/25  1622 02/20/25  1448 07/07/25  1727   AST 13 14 14   ALT 11 14 11       Hemoglobin A1C   Date Value Ref Range Status   02/27/2025 5.2 4.0 - 5.6 % Final     Comment:     ADA Screening Guidelines:  5.7-6.4%  Consistent with prediabetes  >or=6.5%  Consistent with diabetes    High levels of fetal hemoglobin interfere with the HbA1C  assay. Heterozygous hemoglobin variants (HbS, HgC, etc)do  not significantly interfere with this assay.   However, presence of multiple variants may affect accuracy.         TSH        The ASCVD Risk score (Camryn DK, et al., 2019) failed to calculate for the following reasons:    Cannot find a previous HDL lab    Cannot find a previous total  cholesterol lab       BNP    Lab Results   Component Value Date/Time    BNP 36 02/20/2025 02:48 PM     (H) 02/03/2025 04:22 PM    BNP 19 12/29/2023 11:28 AM    BNP 19 11/15/2023 04:57 PM            ECHO    Results for orders placed during the hospital encounter of 07/19/20    Echo Color Flow Doppler? Yes    Interpretation Summary  · Normal left ventricular systolic function. The estimated ejection fraction is 55%.  · Normal LV diastolic function.  · Normal right ventricular systolic function.  · No pulmonary hypertension present.      STRESS TEST    No results found for this or any previous visit.        CATH    No results found for this or any previous visit.              ASSESSMENT AND PLAN     Patient Active Problem List   Diagnosis    Nephrolithiasis    Acute post-traumatic headache, not intractable    Chronic pain disorder    Fibromyalgia    Bacteremia due to Escherichia coli    E. coli UTI    Possible COVID-19 Infection    Essential hypertension    Seizure disorder    Asthma    MDD (major depressive disorder)    Ureteral stone    Left ureteral calculus    Leg DVT (deep venous thromboembolism), acute, left    Anticoagulated    Elevated brain natriuretic peptide (BNP) level    Congestive heart failure         ALLERGIES AND MEDICATION:     Review of patient's allergies indicates:   Allergen Reactions    Penicillins Itching    Codeine Itching        Medication List            Accurate as of July 17, 2025  3:50 PM. If you have any questions, ask your nurse or doctor.                START taking these medications      WEGOVY 0.5 mg/0.5 mL Pnij  Generic drug: semaglutide (weight loss)  Inject 0.5 mg into the skin every 7 days.  Replaces: WEGOVY 0.25 mg/0.5 mL Pnij  Started by: Candelario Chauhan MD            CONTINUE taking these medications      albuterol sulfate 2.5 mg/0.5 mL Nebu  Take 2.5 mg by nebulization every 4 (four) hours as needed (wheeze). Rescue     apixaban 5 mg Tab  Commonly known as: ELIQUIS  Take 1  tablet (5 mg total) by mouth 2 (two) times daily.     ergocalciferol 50,000 unit Cap  Commonly known as: ERGOCALCIFEROL     fluticasone-salmeterol 250-50 mcg/dose 250-50 mcg/dose diskus inhaler  Commonly known as: ADVAIR     * furosemide 20 MG tablet  Commonly known as: LASIX  Take 1 tablet (20 mg total) by mouth as needed.     * furosemide 20 MG tablet  Commonly known as: LASIX  Take 1 tablet (20 mg total) by mouth as needed (1-2 pills as needed for fluid overload).     metoprolol succinate 25 MG 24 hr tablet  Commonly known as: TOPROL-XL  Take 1 tablet (25 mg total) by mouth once daily.     neomycin-polymyxin-hydrocortisone 3.5-10,000-1 mg/mL-unit/mL-% otic suspension  Commonly known as: CORTISPORIN  Place 3 drops into the left ear 3 (three) times daily.     ondansetron 4 MG tablet  Commonly known as: ZOFRAN  Take 1 tablet (4 mg total) by mouth every 8 (eight) hours as needed.     ondansetron 4 MG Tbdl  Commonly known as: ZOFRAN-ODT  Take 1 tablet (4 mg total) by mouth every 6 (six) hours as needed.     pantoprazole 40 MG tablet  Commonly known as: PROTONIX     promethazine 12.5 MG Tab  Commonly known as: PHENERGAN     rosuvastatin 10 MG tablet  Commonly known as: CRESTOR     sertraline 50 MG tablet  Commonly known as: ZOLOFT     spironolactone 25 MG tablet  Commonly known as: ALDACTONE  Take 1 tablet (25 mg total) by mouth once daily.     UNABLE TO FIND           * This list has 2 medication(s) that are the same as other medications prescribed for you. Read the directions carefully, and ask your doctor or other care provider to review them with you.                STOP taking these medications      WEGOVY 0.25 mg/0.5 mL Pnij  Generic drug: semaglutide (weight loss)  Replaced by: WEGOVY 0.5 mg/0.5 mL Pnij  Stopped by: Candelario Chauhan MD               Where to Get Your Medications        These medications were sent to [facility] [address] , [place] [address]      Hours: Mon-Fri,  8a-5:30p Phone: [phone]   WEGOVY 0.5 mg/0.5 mL Pnij         No orders of the defined types were placed in this encounter.    Assessment & Plan    IMPRESSION:  Known heart failure with preserved EF (HFpEF).  Considered possibility of COPD exacerbation based on cough and breathing difficulty.    PLAN SUMMARY:  - Reduce salt intake, use no-salt seasoning, drink water instead of other beverages  - Continue Crestor, Eliquis, Spironolactone, and Metoprolol  - Increase Wegovy from 0.25 mg to 0.5 mg  - Discuss sleep issues with primary care physician  - Contact primary care physician or pulmonologist regarding COPD symptoms and potential need for inhalers  - Follow up in 3 months    HEART FAILURE WITH PRESERVED EJECTION FRACTION (HFPEF):  - Explained HFpEF and its impact on symptoms.  - Reduce salt intake, use no-salt seasoning, drink water instead of other beverages.  - Continued Eliquis.  - Continued Metoprolol.  - Continued Spironolactone.  - could not tolerate Entresto    OBESITY:  - Increased Wegovy from 0.25 mg to 0.5 mg.    HYPERLIPIDEMIA:  - Continued Crestor.    CHRONIC OBSTRUCTIVE PULMONARY DISEASE (COPD):  - Contact primary care physician or pulmonologist regarding COPD symptoms and potential need for inhalers.    SLEEP DISORDER:  - Discuss sleep issues with primary care physician.    FOLLOW-UP:  - Follow up in 3 months.         ANTICOAGULATION MANAGEMENT for DVT:  - Continued Eliquis.    HYPERLIPIDEMIA:  - Continued Crestor.     DVT: Left lower extremity nonocclusive femoral vein DVT.    On Eliquis already.  Continue Eliquis.  Heme-Onc determined lifelong anticoagulation    Follow-up in 3 months      This note was generated with the assistance of ambient listening technology. Verbal consent was obtained by the patient and accompanying visitor(s) for the recording of patient appointment to facilitate this note. I attest to having reviewed and edited the generated note for accuracy, though some syntax or  spelling errors may persist. Please contact the author of this note for any clarification.      Thank you very much for involving me in the care of your patient.  Please do not hesitate to contact me if there are any questions.      Candelario Chauhan MD, FACC, [facility]  Interventional Cardiologist, [facility].       Visit today included increased complexity associated with the care of the episodic problem dvt, chronic anticoagulation, ELEVATED BNP AND NEW ONSET CONGESTIVE HEART FAILURE addressed and managing the longitudinal care of the patient due to the serious and/or complex managed problem(s)   Patient Active Problem List   Diagnosis    Nephrolithiasis    Acute post-traumatic headache, not intractable    Chronic pain disorder    Fibromyalgia    Bacteremia due to Escherichia coli    E. coli UTI    Possible COVID-19 Infection    Essential hypertension    Seizure disorder    Asthma    MDD (major depressive disorder)    Ureteral stone    Left ureteral calculus    Leg DVT (deep venous thromboembolism), acute, left    Anticoagulated    Elevated brain natriuretic peptide (BNP) level    Congestive heart failure     .      This note was dictated with the help of speech recognition software.  There might be un-intended errors and/or substitutions.

## 2025-07-28 ENCOUNTER — HOSPITAL ENCOUNTER (OUTPATIENT)
Dept: RADIOLOGY | Facility: HOSPITAL | Age: 60
Discharge: HOME | End: 2025-07-28
Attending: STUDENT IN AN ORGANIZED HEALTH CARE EDUCATION/TRAINING PROGRAM
Payer: COMMERCIAL

## 2025-07-28 DIAGNOSIS — R60.0 BILATERAL LOWER EXTREMITY EDEMA: ICD-10-CM

## 2025-07-28 DIAGNOSIS — I82.402 LEG DVT (DEEP VENOUS THROMBOEMBOLISM), ACUTE, LEFT: ICD-10-CM

## 2025-07-28 PROCEDURE — 93970 EXTREMITY STUDY: CPT | Mod: TC

## 2025-07-28 PROCEDURE — 93970 EXTREMITY STUDY: CPT | Mod: 26,,, | Performed by: RADIOLOGY

## 2025-08-05 ENCOUNTER — OFFICE VISIT (OUTPATIENT)
Dept: HEMATOLOGY/ONCOLOGY | Facility: CLINIC | Age: 60
End: 2025-08-05
Payer: COMMERCIAL

## 2025-08-05 DIAGNOSIS — R07.9 CHEST PAIN, UNSPECIFIED TYPE: ICD-10-CM

## 2025-08-05 DIAGNOSIS — R60.0 BILATERAL LOWER EXTREMITY EDEMA: ICD-10-CM

## 2025-08-05 DIAGNOSIS — I82.402 LEG DVT (DEEP VENOUS THROMBOEMBOLISM), ACUTE, LEFT: ICD-10-CM

## 2025-08-05 DIAGNOSIS — Z79.01 ANTICOAGULATED: ICD-10-CM

## 2025-08-05 DIAGNOSIS — I73.9 CLAUDICATION: Primary | ICD-10-CM

## 2025-08-05 DIAGNOSIS — Z76.0 MEDICATION REFILL: ICD-10-CM

## 2025-08-05 PROCEDURE — 3044F HG A1C LEVEL LT 7.0%: CPT | Mod: CPTII,95,, | Performed by: STUDENT IN AN ORGANIZED HEALTH CARE EDUCATION/TRAINING PROGRAM

## 2025-08-05 PROCEDURE — 4010F ACE/ARB THERAPY RXD/TAKEN: CPT | Mod: CPTII,95,, | Performed by: STUDENT IN AN ORGANIZED HEALTH CARE EDUCATION/TRAINING PROGRAM

## 2025-08-05 PROCEDURE — 98006 SYNCH AUDIO-VIDEO EST MOD 30: CPT | Mod: 95,,, | Performed by: STUDENT IN AN ORGANIZED HEALTH CARE EDUCATION/TRAINING PROGRAM

## 2025-08-05 PROCEDURE — G2211 COMPLEX E/M VISIT ADD ON: HCPCS | Mod: 95,,, | Performed by: STUDENT IN AN ORGANIZED HEALTH CARE EDUCATION/TRAINING PROGRAM

## 2025-08-05 NOTE — Clinical Note
-please get pt vascular surgery apt with Dr. Hansen -RTC in 6 months for MD visit and CBC with Dr. Coyle

## 2025-08-05 NOTE — PROGRESS NOTES
The patient location is: [place]  The chief complaint leading to consultation is: results    Visit type: audiovisual    Face to Face time with patient: 10  30 minutes of total time spent on the encounter, which includes face to face time and non-face to face time preparing to see the patient (eg, review of tests), Obtaining and/or reviewing separately obtained history, Documenting clinical information in the electronic or other health record, Independently interpreting results (not separately reported) and communicating results to the patient/family/caregiver, or Care coordination (not separately reported).         Each patient to whom he or she provides medical services by telemedicine is:  (1) informed of the relationship between the physician and patient and the respective role of any other health care provider with respect to management of the patient; and (2) notified that he or she may decline to receive medical services by telemedicine and may withdraw from such care at any time.    Notes:       HPI  Pt is a 60 y.o. female who  has a past medical history of Asthma, Brain mass (2007), Depression, Fibromyalgia, Hypertension, and Seizures (2007)..  Who presents for follow up of DVT.  Pt recently presented to  ED after complaining of bilateral leg swelling 2 weeks duration.  CTA negative for PE but LLE US positive for non occlusive DVT.  Patient also states she had a knee replacement surgery one year ago and a few months later she had a blood clot. Patient further states she had an epidural injection last Friday prior to ED presentation. Patient denied recent flights or was on blood thinners. Even though clots most likely provoked since she has a hx of multiple clots hypercoag workup ordered and will benefit from lifelong eliquis. Bleeding precautions given and pt agreeable to continue eliquis.       Interval hx:     Repeat US did not show new or worsening clot and reviewed with pt.  Will continue eliquis.   Pt endorses chronic cramping and claudication like symptoms in LE bilaterally that could be residual symptoms from clots, from diuretics or from another vascular issue.  Eliquis refilled and pt agreeable to vascular surgery referral.      LLE DVT/Hx of clots  -Past DVT most likely provoked by surgery  -Seen on recent ED visit after epidural, inactivity can contribute  -Nonocclusive deep venous thrombosis within the left femoral vein on US  -Has cardiology follow up  -hypercoag workup  unremarkable  -Would recommend lifelong eliquis, refills sent to pharmacy  -Repeat US shows improvement in chronic clot  -RTC in 6 months for MD visit with Dr. Coyle and CBC          Lung nodule  -Seen on recent CTA  -Stable 1.2 cm right lower lobe pulmonary nodule, requiring no follow-up as appears stable compared to remote CT from 2015 and likely reflecting benign etiology per radiology   -Pt following with pulmonologist

## 2025-08-06 ENCOUNTER — TELEPHONE (OUTPATIENT)
Dept: VASCULAR SURGERY | Facility: CLINIC | Age: 60
End: 2025-08-06
Payer: COMMERCIAL

## 2025-08-06 NOTE — TELEPHONE ENCOUNTER
----- Message from Lulu Caraballo PA-C sent at 8/5/2025  2:37 PM CDT -----  Regarding: FW: aptt  Please schedule this patient to see me.     Thanks, Lulu  ----- Message -----  From: Shanon Byrd MA  Sent: 8/5/2025   2:30 PM CDT  To: [facility] Staff  Subject: aptt                                             Please advise for appointment   Thank You,  Salbador  ----- Message -----  From: Kelin Mcdaniels MD  Sent: 8/5/2025   2:15 PM CDT  To: [facility] Clinical Staff    -please get pt vascular surgery apt with Dr. Hansen  -RTC in 6 months for MD visit and CBC with Dr. Coyle

## 2025-08-25 ENCOUNTER — HOSPITAL ENCOUNTER (OUTPATIENT)
Dept: RADIOLOGY | Facility: HOSPITAL | Age: 60
Discharge: HOME | End: 2025-08-25
Attending: INTERNAL MEDICINE
Payer: COMMERCIAL

## 2025-08-25 DIAGNOSIS — M25.511 PAIN IN RIGHT SHOULDER: ICD-10-CM

## 2025-08-25 PROCEDURE — 73030 X-RAY EXAM OF SHOULDER: CPT | Mod: 26,RT,, | Performed by: RADIOLOGY

## 2025-08-25 PROCEDURE — 73030 X-RAY EXAM OF SHOULDER: CPT | Mod: TC,RT

## 2025-08-28 ENCOUNTER — TELEPHONE (OUTPATIENT)
Dept: NEUROLOGY | Facility: CLINIC | Age: 60
End: 2025-08-28
Payer: COMMERCIAL

## 2025-08-28 ENCOUNTER — INITIAL CONSULT (OUTPATIENT)
Dept: VASCULAR SURGERY | Facility: CLINIC | Age: 60
End: 2025-08-28
Payer: COMMERCIAL

## 2025-08-28 VITALS
DIASTOLIC BLOOD PRESSURE: 76 MMHG | BODY MASS INDEX: 29.23 KG/M2 | SYSTOLIC BLOOD PRESSURE: 111 MMHG | HEART RATE: 71 BPM | WEIGHT: 171.19 LBS | HEIGHT: 64 IN

## 2025-08-28 DIAGNOSIS — R60.9 SWELLING: Primary | ICD-10-CM

## 2025-08-28 PROCEDURE — 99204 OFFICE O/P NEW MOD 45 MIN: CPT | Mod: S$GLB,,, | Performed by: SURGERY

## 2025-08-28 PROCEDURE — 99999 PR PBB SHADOW E&M-EST. PATIENT-LVL IV: CPT | Mod: PBBFAC,,, | Performed by: SURGERY

## (undated) DEVICE — COVER SNAP KAP 26IN

## (undated) DEVICE — CANISTER SUCTION 2 LTR

## (undated) DEVICE — CATH URTRL OPEN END STR TIP 5F

## (undated) DEVICE — GLOVE SURGICAL LATEX SZ 6.5

## (undated) DEVICE — FIBER MOSES 200 DFL

## (undated) DEVICE — SUPPORT ULNA NERVE PROTECTOR

## (undated) DEVICE — GLOVE BIOGEL PI MICRO SZ 7

## (undated) DEVICE — SYR ONLY LUER LOCK 20CC

## (undated) DEVICE — SENSOR DUAL FLEX STR 150CM

## (undated) DEVICE — Device

## (undated) DEVICE — SHEATH ACC HD 12/14 FRX36CM

## (undated) DEVICE — ADAPT UROLOGICAL 2-WAY STOPCK

## (undated) DEVICE — SOL IRR NACL .9% 3000ML

## (undated) DEVICE — SOL IRR STRL WATER 500ML

## (undated) DEVICE — MAT QUICK 40X30 FLOOR FLUID LF

## (undated) DEVICE — SHEATH NAVIGATOR HD 11/13 36

## (undated) DEVICE — BASKET STONE RETRV 1.9FRX120CM

## (undated) DEVICE — SEE MEDLINE ITEM 152532

## (undated) DEVICE — BLANKET UPPER BODY 78.7X29.9IN